# Patient Record
Sex: FEMALE | Race: WHITE | NOT HISPANIC OR LATINO | URBAN - METROPOLITAN AREA
[De-identification: names, ages, dates, MRNs, and addresses within clinical notes are randomized per-mention and may not be internally consistent; named-entity substitution may affect disease eponyms.]

---

## 2017-04-03 ENCOUNTER — OUTPATIENT (OUTPATIENT)
Dept: OUTPATIENT SERVICES | Facility: HOSPITAL | Age: 22
LOS: 1 days | Discharge: HOME | End: 2017-04-03

## 2017-06-27 DIAGNOSIS — Z00.00 ENCOUNTER FOR GENERAL ADULT MEDICAL EXAMINATION WITHOUT ABNORMAL FINDINGS: ICD-10-CM

## 2017-06-28 PROBLEM — Z00.00 ENCOUNTER FOR PREVENTIVE HEALTH EXAMINATION: Status: ACTIVE | Noted: 2017-06-28

## 2017-07-11 ENCOUNTER — APPOINTMENT (OUTPATIENT)
Dept: SURGERY | Facility: CLINIC | Age: 22
End: 2017-07-11

## 2021-09-29 ENCOUNTER — APPOINTMENT (OUTPATIENT)
Dept: NEUROLOGY | Facility: CLINIC | Age: 26
End: 2021-09-29

## 2021-10-28 ENCOUNTER — INPATIENT (INPATIENT)
Facility: HOSPITAL | Age: 26
LOS: 6 days | Discharge: HOME | End: 2021-11-04
Attending: INTERNAL MEDICINE | Admitting: INTERNAL MEDICINE
Payer: MEDICAID

## 2021-10-28 VITALS
TEMPERATURE: 99 F | WEIGHT: 110.01 LBS | RESPIRATION RATE: 20 BRPM | DIASTOLIC BLOOD PRESSURE: 78 MMHG | SYSTOLIC BLOOD PRESSURE: 125 MMHG | HEART RATE: 122 BPM | OXYGEN SATURATION: 100 %

## 2021-10-28 LAB
ALBUMIN SERPL ELPH-MCNC: 4.9 G/DL — SIGNIFICANT CHANGE UP (ref 3.5–5.2)
ALP SERPL-CCNC: 72 U/L — SIGNIFICANT CHANGE UP (ref 30–115)
ALT FLD-CCNC: 10 U/L — SIGNIFICANT CHANGE UP (ref 0–41)
ANION GAP SERPL CALC-SCNC: 10 MMOL/L — SIGNIFICANT CHANGE UP (ref 7–14)
APAP SERPL-MCNC: <5 UG/ML — LOW (ref 10–30)
APPEARANCE UR: CLEAR — SIGNIFICANT CHANGE UP
AST SERPL-CCNC: 16 U/L — SIGNIFICANT CHANGE UP (ref 0–41)
BACTERIA # UR AUTO: ABNORMAL /HPF
BASOPHILS # BLD AUTO: 0.04 K/UL — SIGNIFICANT CHANGE UP (ref 0–0.2)
BASOPHILS NFR BLD AUTO: 0.7 % — SIGNIFICANT CHANGE UP (ref 0–1)
BILIRUB SERPL-MCNC: <0.2 MG/DL — SIGNIFICANT CHANGE UP (ref 0.2–1.2)
BILIRUB UR-MCNC: NEGATIVE — SIGNIFICANT CHANGE UP
BUN SERPL-MCNC: 11 MG/DL — SIGNIFICANT CHANGE UP (ref 10–20)
CALCIUM SERPL-MCNC: 9.3 MG/DL — SIGNIFICANT CHANGE UP (ref 8.5–10.1)
CHLORIDE SERPL-SCNC: 102 MMOL/L — SIGNIFICANT CHANGE UP (ref 98–110)
CO2 SERPL-SCNC: 26 MMOL/L — SIGNIFICANT CHANGE UP (ref 17–32)
COLOR SPEC: YELLOW — SIGNIFICANT CHANGE UP
CREAT SERPL-MCNC: 0.7 MG/DL — SIGNIFICANT CHANGE UP (ref 0.7–1.5)
DIFF PNL FLD: ABNORMAL
EOSINOPHIL # BLD AUTO: 0.14 K/UL — SIGNIFICANT CHANGE UP (ref 0–0.7)
EOSINOPHIL NFR BLD AUTO: 2.5 % — SIGNIFICANT CHANGE UP (ref 0–8)
EPI CELLS # UR: ABNORMAL /HPF
ETHANOL SERPL-MCNC: <10 MG/DL — SIGNIFICANT CHANGE UP
GLUCOSE SERPL-MCNC: 100 MG/DL — HIGH (ref 70–99)
GLUCOSE UR QL: NEGATIVE MG/DL — SIGNIFICANT CHANGE UP
HCG SERPL QL: NEGATIVE — SIGNIFICANT CHANGE UP
HCT VFR BLD CALC: 41.7 % — SIGNIFICANT CHANGE UP (ref 37–47)
HGB BLD-MCNC: 13.6 G/DL — SIGNIFICANT CHANGE UP (ref 12–16)
IMM GRANULOCYTES NFR BLD AUTO: 0.2 % — SIGNIFICANT CHANGE UP (ref 0.1–0.3)
KETONES UR-MCNC: NEGATIVE — SIGNIFICANT CHANGE UP
LEUKOCYTE ESTERASE UR-ACNC: NEGATIVE — SIGNIFICANT CHANGE UP
LYMPHOCYTES # BLD AUTO: 1.61 K/UL — SIGNIFICANT CHANGE UP (ref 1.2–3.4)
LYMPHOCYTES # BLD AUTO: 29.3 % — SIGNIFICANT CHANGE UP (ref 20.5–51.1)
MAGNESIUM SERPL-MCNC: 2.1 MG/DL — SIGNIFICANT CHANGE UP (ref 1.8–2.4)
MCHC RBC-ENTMCNC: 30.5 PG — SIGNIFICANT CHANGE UP (ref 27–31)
MCHC RBC-ENTMCNC: 32.6 G/DL — SIGNIFICANT CHANGE UP (ref 32–37)
MCV RBC AUTO: 93.5 FL — SIGNIFICANT CHANGE UP (ref 81–99)
MONOCYTES # BLD AUTO: 0.38 K/UL — SIGNIFICANT CHANGE UP (ref 0.1–0.6)
MONOCYTES NFR BLD AUTO: 6.9 % — SIGNIFICANT CHANGE UP (ref 1.7–9.3)
NEUTROPHILS # BLD AUTO: 3.32 K/UL — SIGNIFICANT CHANGE UP (ref 1.4–6.5)
NEUTROPHILS NFR BLD AUTO: 60.4 % — SIGNIFICANT CHANGE UP (ref 42.2–75.2)
NITRITE UR-MCNC: NEGATIVE — SIGNIFICANT CHANGE UP
NRBC # BLD: 0 /100 WBCS — SIGNIFICANT CHANGE UP (ref 0–0)
PH UR: 7 — SIGNIFICANT CHANGE UP (ref 5–8)
PLATELET # BLD AUTO: 301 K/UL — SIGNIFICANT CHANGE UP (ref 130–400)
POTASSIUM SERPL-MCNC: 4.8 MMOL/L — SIGNIFICANT CHANGE UP (ref 3.5–5)
POTASSIUM SERPL-SCNC: 4.8 MMOL/L — SIGNIFICANT CHANGE UP (ref 3.5–5)
PROT SERPL-MCNC: 7.5 G/DL — SIGNIFICANT CHANGE UP (ref 6–8)
PROT UR-MCNC: NEGATIVE MG/DL — SIGNIFICANT CHANGE UP
RBC # BLD: 4.46 M/UL — SIGNIFICANT CHANGE UP (ref 4.2–5.4)
RBC # FLD: 11.9 % — SIGNIFICANT CHANGE UP (ref 11.5–14.5)
RBC CASTS # UR COMP ASSIST: ABNORMAL /HPF
SALICYLATES SERPL-MCNC: <0.3 MG/DL — LOW (ref 4–30)
SARS-COV-2 RNA SPEC QL NAA+PROBE: SIGNIFICANT CHANGE UP
SODIUM SERPL-SCNC: 138 MMOL/L — SIGNIFICANT CHANGE UP (ref 135–146)
SP GR SPEC: 1.02 — SIGNIFICANT CHANGE UP (ref 1.01–1.03)
UROBILINOGEN FLD QL: 0.2 MG/DL — SIGNIFICANT CHANGE UP
WBC # BLD: 5.5 K/UL — SIGNIFICANT CHANGE UP (ref 4.8–10.8)
WBC # FLD AUTO: 5.5 K/UL — SIGNIFICANT CHANGE UP (ref 4.8–10.8)

## 2021-10-28 PROCEDURE — 93010 ELECTROCARDIOGRAM REPORT: CPT

## 2021-10-28 PROCEDURE — 99291 CRITICAL CARE FIRST HOUR: CPT

## 2021-10-28 PROCEDURE — 99222 1ST HOSP IP/OBS MODERATE 55: CPT

## 2021-10-28 RX ORDER — SODIUM CHLORIDE 9 MG/ML
1000 INJECTION INTRAMUSCULAR; INTRAVENOUS; SUBCUTANEOUS ONCE
Refills: 0 | Status: COMPLETED | OUTPATIENT
Start: 2021-10-28 | End: 2021-10-28

## 2021-10-28 RX ORDER — MIDAZOLAM HYDROCHLORIDE 1 MG/ML
0.02 INJECTION, SOLUTION INTRAMUSCULAR; INTRAVENOUS
Qty: 100 | Refills: 0 | Status: DISCONTINUED | OUTPATIENT
Start: 2021-10-28 | End: 2021-10-30

## 2021-10-28 RX ORDER — SOD SULF/SODIUM/NAHCO3/KCL/PEG
4000 SOLUTION, RECONSTITUTED, ORAL ORAL
Refills: 0 | Status: DISCONTINUED | OUTPATIENT
Start: 2021-10-28 | End: 2021-10-29

## 2021-10-28 RX ORDER — CHLORHEXIDINE GLUCONATE 213 G/1000ML
15 SOLUTION TOPICAL EVERY 12 HOURS
Refills: 0 | Status: DISCONTINUED | OUTPATIENT
Start: 2021-10-28 | End: 2021-10-29

## 2021-10-28 RX ORDER — PROPOFOL 10 MG/ML
10 INJECTION, EMULSION INTRAVENOUS
Qty: 1000 | Refills: 0 | Status: DISCONTINUED | OUTPATIENT
Start: 2021-10-28 | End: 2021-10-30

## 2021-10-28 RX ORDER — SODIUM CHLORIDE 9 MG/ML
1000 INJECTION INTRAMUSCULAR; INTRAVENOUS; SUBCUTANEOUS
Refills: 0 | Status: DISCONTINUED | OUTPATIENT
Start: 2021-10-28 | End: 2021-10-29

## 2021-10-28 RX ORDER — ACTIVATED CHARCOAL 208 MG/ML
50 SUSPENSION ORAL ONCE
Refills: 0 | Status: COMPLETED | OUTPATIENT
Start: 2021-10-28 | End: 2021-10-28

## 2021-10-28 RX ORDER — FAMOTIDINE 10 MG/ML
20 INJECTION INTRAVENOUS EVERY 12 HOURS
Refills: 0 | Status: DISCONTINUED | OUTPATIENT
Start: 2021-10-28 | End: 2021-11-03

## 2021-10-28 RX ORDER — ACTIVATED CHARCOAL 25 G/120ML
50 SUSPENSION, ORAL (FINAL DOSE FORM) ORAL ONCE
Refills: 0 | Status: DISCONTINUED | OUTPATIENT
Start: 2021-10-28 | End: 2021-10-28

## 2021-10-28 RX ORDER — LACTULOSE 10 G/15ML
20 SOLUTION ORAL ONCE
Refills: 0 | Status: DISCONTINUED | OUTPATIENT
Start: 2021-10-28 | End: 2021-10-28

## 2021-10-28 RX ADMIN — SODIUM CHLORIDE 125 MILLILITER(S): 9 INJECTION INTRAMUSCULAR; INTRAVENOUS; SUBCUTANEOUS at 22:44

## 2021-10-28 RX ADMIN — Medication 1 MILLIGRAM(S): at 22:06

## 2021-10-28 RX ADMIN — Medication 1 MILLIGRAM(S): at 22:10

## 2021-10-28 RX ADMIN — SODIUM CHLORIDE 1000 MILLILITER(S): 9 INJECTION INTRAMUSCULAR; INTRAVENOUS; SUBCUTANEOUS at 16:51

## 2021-10-28 NOTE — H&P ADULT - NSHPPHYSICALEXAM_GEN_ALL_CORE
PHYSICAL EXAM:  GENERAL: NAD, well-groomed, well-developed  HEAD:  Atraumatic, Normocephalic  EYES: EOMI, PERRLA, conjunctiva and sclera clear  ENMT: No tonsillar erythema, exudates, or enlargement; Moist mucous membranes, Good dentition, No lesions  NECK: Supple, No JVD, Normal thyroid  NERVOUS SYSTEM:  Alert & Oriented X3, Good concentration; Motor Strength 5/5 B/L upper and lower extremities; DTRs 2+ intact and symmetric  CHEST/LUNG: Clear to percussion bilaterally; No rales, rhonchi, wheezing, or rubs  HEART: Regular rate and rhythm; No murmurs, rubs, or gallops  ABDOMEN: Soft, Nontender, Nondistended; Bowel sounds present  EXTREMITIES:  2+ Peripheral Pulses, No clubbing, cyanosis, or edema  LYMPH: No lymphadenopathy noted  SKIN: No rashes or lesions

## 2021-10-28 NOTE — CONSULT NOTE ADULT - SUBJECTIVE AND OBJECTIVE BOX
MEDICAL TOXICOLOGY CONSULT    HPI:25 year old F hx of borderline personality disorder, anxiety, depression, substance abuse, prior SI by ingestion, presents to ED with ingestions. Patient reported taking Venlafaxine 150mg x 20 tab and bupropion 100mg x 20 tabs at ~3pm. Denies other co-ingestions. In the ED, the patient is asymptomatic, with normal vitals and ekg, baseline mental status, afebrile, no hyperreflexia or clonus.     ONSET / TIME of exposure(s): 3pm  QUANTITY of exposure(s):  Venlafaxine 150mg x 20 tab (3 grams, bupropion 100mg x 20 tabs (2 grams)  ROUTE of exposure:  Ingestion  CONTEXT of exposure: intentional ingestion   ASSOCIATED symptoms: None    PAST MEDICAL & SURGICAL HISTORY:  H/O suicide attempt  Anxiety and depression    PHYSICAL EXAM  Vital Signs Last 24 Hrs  T(C): 37.1 (28 Oct 2021 15:58), Max: 37.1 (28 Oct 2021 15:58)  T(F): 98.7 (28 Oct 2021 15:58), Max: 98.7 (28 Oct 2021 15:58)  HR: 111 (28 Oct 2021 19:06) (98 - 122)  BP: 132/82 (28 Oct 2021 19:06) (125/78 - 132/82)  BP(mean): --  RR: 18 (28 Oct 2021 19:06) (18 - 20)  SpO2: 100% (28 Oct 2021 19:06) (100% - 100%)    SIGNIFICANT LABORATORY STUDIES:                        13.6   5.50  )-----------( 301      ( 28 Oct 2021 16:27 )             41.7     10-    138  |  102  |  11  ----------------------------<  100<H>  4.8   |  26  |  0.7    Ca    9.3      28 Oct 2021 16:27  Mg     2.1     10-    TPro  7.5  /  Alb  4.9  /  TBili  <0.2  /  DBili  x   /  AST  16  /  ALT  10  /  AlkPhos  72  10-28    Urinalysis Basic - ( 28 Oct 2021 16:27 )    Color: Yellow / Appearance: Clear / S.020 / pH: x  Gluc: x / Ketone: Negative  / Bili: Negative / Urobili: 0.2 mg/dL   Blood: x / Protein: Negative mg/dL / Nitrite: Negative   Leuk Esterase: Negative / RBC: 3-5 /HPF / WBC x   Sq Epi: x / Non Sq Epi: Many /HPF / Bacteria: TNTC /HPF    Anion Gap: 10 10-28 @ 16:27  CK: -- 10-28 @ 16:27  Troponin:  --  10-28 @ 16:27  Pro-BNP:  --  10-28 @ 16:27  VBG:  --  10-28 @ 16:27  Carboxyhemoglobin %:  --  10-28 @ 16:27  Methemoglobin %:  --  10-28 @ 16:27  Osmolality Serum:  --  10-28 @ 16:27  Aspirin Level: <0.3<L>  10-28 @ 16:27  Acetaminophen Level:  <5.0<L>  10-28 @ 16:27  Ethanol Level:  --  10-28 @ 16:27  Digoxin Level:  --  10-28 @ 16:27  Phenytoin Level:  --  10-28 @ 16:27  Carbamazepine level:  --  10-28 @ 16:27  Lamotrigine level:  --  10-28 @ 16:27    ECrate, NSR, 132PR, 86QRS, 447QTc     MEDICAL TOXICOLOGY CONSULT    HPI:25 year old F hx of borderline personality disorder, anxiety, depression, substance abuse, prior SI by ingestion, presents to ED with ingestions. Patient reported taking Venlafaxine 150mg x 20 tab and bupropion 100mg x 20 tabs at ~3pm. Denies other co-ingestions. In the ED, the patient is asymptomatic, with normal vitals and ekg, baseline mental status, afebrile, no hyperreflexia or clonus.     After a period of observation, patient now with sinus tachycardia to 130.     17:26 EKG: NSR rate 95, pr 132, qrs 86,   19:33 EKG: SINUS TACHYCARDIA rate 130, pr 136, qrs 78, qtc 450    ONSET / TIME of exposure(s): 3pm  QUANTITY of exposure(s):  Venlafaxine 150mg x 20 tab (3 grams, bupropion 100mg x 20 tabs (2 grams)  ROUTE of exposure:  Ingestion  CONTEXT of exposure: intentional ingestion   ASSOCIATED symptoms: None    PAST MEDICAL & SURGICAL HISTORY:  H/O suicide attempt  Anxiety and depression    PHYSICAL EXAM  Vital Signs Last 24 Hrs  T(C): 37.1 (28 Oct 2021 15:58), Max: 37.1 (28 Oct 2021 15:58)  T(F): 98.7 (28 Oct 2021 15:58), Max: 98.7 (28 Oct 2021 15:58)  HR: 111 (28 Oct 2021 19:06) (98 - 122)  BP: 132/82 (28 Oct 2021 19:06) (125/78 - 132/82)  BP(mean): --  RR: 18 (28 Oct 2021 19:06) (18 - 20)  SpO2: 100% (28 Oct 2021 19:06) (100% - 100%)    SIGNIFICANT LABORATORY STUDIES:                        13.6   5.50  )-----------( 301      ( 28 Oct 2021 16:27 )             41.7     10-    138  |  102  |  11  ----------------------------<  100<H>  4.8   |  26  |  0.7    Ca    9.3      28 Oct 2021 16:27  Mg     2.1     10-    TPro  7.5  /  Alb  4.9  /  TBili  <0.2  /  DBili  x   /  AST  16  /  ALT  10  /  AlkPhos  72  10-28    Urinalysis Basic - ( 28 Oct 2021 16:27 )    Color: Yellow / Appearance: Clear / S.020 / pH: x  Gluc: x / Ketone: Negative  / Bili: Negative / Urobili: 0.2 mg/dL   Blood: x / Protein: Negative mg/dL / Nitrite: Negative   Leuk Esterase: Negative / RBC: 3-5 /HPF / WBC x   Sq Epi: x / Non Sq Epi: Many /HPF / Bacteria: TNTC /HPF    Anion Gap: 10 10-28 @ 16:27  CK: -- 10-28 @ 16:27  Troponin:  --  10-28 @ 16:27  Pro-BNP:  --  10-28 @ 16:27  VBG:  --  10-28 @ 16:27  Carboxyhemoglobin %:  --  10-28 @ 16:27  Methemoglobin %:  --  10-28 @ 16:27  Osmolality Serum:  --  10-28 @ 16:27  Aspirin Level: <0.3<L>  10-28 @ 16:27  Acetaminophen Level:  <5.0<L>  10-28 @ 16:27  Ethanol Level:  --  10-28 @ 16:27  Digoxin Level:  --  10-28 @ 16:27  Phenytoin Level:  --  10-28 @ 16:27  Carbamazepine level:  --  10-28 @ 16:27  Lamotrigine level:  --  10-28 @ 16:27    ECrate, NSR, 132PR, 86QRS, 447QTc     MEDICAL TOXICOLOGY CONSULT    HPI:25 year old F hx of borderline personality disorder, anxiety, depression, substance abuse, prior SI by ingestion, presents to ED with ingestions. Patient reported taking Venlafaxine 150mg x 20 tab and bupropion 100mg x 20 tabs at ~3pm. Denies other co-ingestions. In the ED, the patient is asymptomatic, with normal vitals and ekg, baseline mental status, afebrile, no hyperreflexia or clonus.     After a period of observation, patient now with sinus tachycardia to 130. At ~10pm, patient had a witnessed seizure, currently post-ictal.     17:26 EKG: NSR rate 95, pr 132, qrs 86,   19:33 EKG: SINUS TACHYCARDIA rate 130, pr 136, qrs 78, qtc 450    ONSET / TIME of exposure(s): 3pm  QUANTITY of exposure(s):  Venlafaxine 150mg x 20 tab (3 grams, bupropion 100mg x 20 tabs (2 grams)  ROUTE of exposure:  Ingestion  CONTEXT of exposure: intentional ingestion   ASSOCIATED symptoms: None    PAST MEDICAL & SURGICAL HISTORY:  H/O suicide attempt  Anxiety and depression    PHYSICAL EXAM  Vital Signs Last 24 Hrs  T(C): 37.1 (28 Oct 2021 15:58), Max: 37.1 (28 Oct 2021 15:58)  T(F): 98.7 (28 Oct 2021 15:58), Max: 98.7 (28 Oct 2021 15:58)  HR: 111 (28 Oct 2021 19:06) (98 - 122)  BP: 132/82 (28 Oct 2021 19:06) (125/78 - 132/82)  BP(mean): --  RR: 18 (28 Oct 2021 19:06) (18 - 20)  SpO2: 100% (28 Oct 2021 19:06) (100% - 100%)    SIGNIFICANT LABORATORY STUDIES:                        13.6   5.50  )-----------( 301      ( 28 Oct 2021 16:27 )             41.7     10-28    138  |  102  |  11  ----------------------------<  100<H>  4.8   |  26  |  0.7    Ca    9.3      28 Oct 2021 16:27  Mg     2.1     10-28    TPro  7.5  /  Alb  4.9  /  TBili  <0.2  /  DBili  x   /  AST  16  /  ALT  10  /  AlkPhos  72  10-28    Urinalysis Basic - ( 28 Oct 2021 16:27 )    Color: Yellow / Appearance: Clear / S.020 / pH: x  Gluc: x / Ketone: Negative  / Bili: Negative / Urobili: 0.2 mg/dL   Blood: x / Protein: Negative mg/dL / Nitrite: Negative   Leuk Esterase: Negative / RBC: 3-5 /HPF / WBC x   Sq Epi: x / Non Sq Epi: Many /HPF / Bacteria: TNTC /HPF    Anion Gap: 10 10-28 @ 16:27  CK: -- 10-28 @ 16:27  Troponin:  --  10-28 @ 16:27  Pro-BNP:  --  10-28 @ 16:27  VBG:  --  10-28 @ 16:27  Carboxyhemoglobin %:  --  10-28 @ 16:27  Methemoglobin %:  --  10-28 @ 16:27  Osmolality Serum:  --  10-28 @ 16:27  Aspirin Level: <0.3<L>  10-28 @ 16:27  Acetaminophen Level:  <5.0<L>  10-28 @ 16:27  Ethanol Level:  --  10-28 @ 16:27  Digoxin Level:  --  10-28 @ 16:27  Phenytoin Level:  --  10-28 @ 16:27  Carbamazepine level:  --  10-28 @ 16:27  Lamotrigine level:  --  10-28 @ 16:27

## 2021-10-28 NOTE — ED PROVIDER NOTE - PROGRESS NOTE DETAILS
patient placed on 1:1 accepts toxicology consultation . spoke with tox. recommending 6 hr observation and monitoring. patient with tachycardia and temp 99.4. toxicology requesting ICU eval, prn benzo and iv hydration patient refusing ativan

## 2021-10-28 NOTE — ED PROVIDER NOTE - ATTENDING CONTRIBUTION TO CARE
26 yo F PMHx anxiety and depression, h/o suicide attempt in the past presents with family member after taking about 10-20 tablets of Effexor (mixed doses of 75 and 150 mg)and 20 100 mg Wellbutrin tablets about 90 minutes ago. Pts intent is to self harm 26 yo F PMHx anxiety and depression, h/o suicide attempt in the past presents with family member after taking about 10-20 tablets of Effexor (mixed doses of 75 and 150 mg)and 20 100 mg Wellbutrin tablets about 90 minutes ago. Pts intent is to self harm.    Pt states currently feeling fine, no CP, no SOB, no n/v.  On exam pt in NAD AAO x 3, Op clear, MMM, Lungs cta b/l no wrr, abd soft nt nd, nml BS, no rash, no edema, good tone, equal strength, no clonus    will check labs, cardiac monitoring, EKG.  Pt consents to toxicology consult

## 2021-10-28 NOTE — ED PROVIDER NOTE - CLINICAL SUMMARY MEDICAL DECISION MAKING FREE TEXT BOX
Pt with intentional overdose of wellbutrin and Effexor.  Toxicology consulted and rec observation, however while patient in ED HR remained elevated.  Pt re-examined and rectal temp noted.  Tox rec IVFs and benzos.  Ativan ordered for patient but she initially refused it. Critical care consulted for admission.  Pt then had brief seizure witnessed by staff and family member.  The Ativan was given.  Pt post-ictal able to answer simple questions, .  Tox and Crit care updated on events,.   Attending Statement: I have personally provided the amount of critical care time documented below excluding time spent on separate procedures.     Critical Care Time Spent (min) Must be 30 or more minutes to qualify: 90

## 2021-10-28 NOTE — ED PROVIDER NOTE - OBJECTIVE STATEMENT
24 y/o female with hx of prior suicide attempt in past 6 yrs ago presents to the ED for suicide gesture 1.5 hrs prior to arrival to the ED. patient states she took effexor 150mg x 20 tablets and wellbutrin 100mg x 20 tablets . patient smokes marijuana . patient denies any alcohol. patient states she took nyquil two days ago. patient denies any recent stressors . patient denies hearing voices. patient denies any cp, sob, headache, tremors. patient denies any difficulty urinating or dry mouth

## 2021-10-28 NOTE — ED ADULT TRIAGE NOTE - CHIEF COMPLAINT QUOTE
BIBA from home as per pt "I took 45 of Wellbutrin and 40 pills of Effexor". Per EMS pt overdosed on Wellbutrin and Effexor intentionally

## 2021-10-28 NOTE — H&P ADULT - HISTORY OF PRESENT ILLNESS
25 year old F hx of borderline personality disorder, anxiety, depression, substance abuse, prior SI by ingestion, presents to ED with ingestions. Patient reported taking Venlafaxine 150mg x 20 tab and bupropion 100mg x 20 tabs at ~3pm.

## 2021-10-28 NOTE — ED PROVIDER NOTE - PHYSICAL EXAMINATION
Vital Signs: I have reviewed the initial vital signs.  Constitutional: well-nourished, no acute distress, normocephalic  Eyes: PERRLA, EOMI, no nystagmus, clear conjunctiva  ENT: MMM, no tongue fasiculations  Cardiovascular: regular rate, regular rhythm, no murmur appreciated  Respiratory: unlabored respiratory effort, clear to auscultation bilaterally  Gastrointestinal: soft, non-tender, non-distended  abdomen, no pulsatile mass  Musculoskeletal: supple neck, no lower extremity edema, no bony tenderness, no tremors  Integumentary: warm, dry, no rash  Neurologic: awake, alert, cranial nerves II-XII grossly intact, extremities’ motor and sensory functions grossly intact, no focal deficits,   Psychiatric: appropriate mood, appropriate affect

## 2021-10-28 NOTE — CONSULT NOTE ADULT - ASSESSMENT
25 year old F hx of borderline personality disorder, anxiety, depression, substance abuse, prior SI by ingestion, presents with ingestions of 2 grams bupropion and 2 gram venlafaxine     Problem: Ingestion of venlafaxine and bupropion  - Currently asymptomatic, no tachycardia, QTC prolongation, ams, seizures, serotonin syndrome  - Monitor for 6 hours after arrival for symptoms above  - If asymptomatic after obs, can be cleared from toxicological standpoint.   - If patient develops any concerning symptoms, please notify tox service.     Thank you for this consult  Toxicology consults: [phone]     25 year old F hx of borderline personality disorder, anxiety, depression, substance abuse, prior SI by ingestion, presents with ingestions of 2 grams bupropion and 2 gram venlafaxine. Asymptomatic at at ED presentation, but became tachycardic after two hours in the ED.     Problem: Ingestion of venlafaxine and bupropion  - Sinus tachycardia to 130 with no qrs or qt prolongation, no ams, seizures, or serotonin syndrome.   - co-ingestion of venlafaxine and bupropion put the patient at higher risk of severe toxicity and serotonin syndrome   - Admit to ICU for q1 hour EKG for next 3 hours; and then 6 hours later, and then 12 hours.   - q2 neuro checks for development of fever, AMS, seizures, hyperreflexia, clonus, serotonin syndrome.   - PRN benzodiazepine for tachycardia  - If patient develops any concerning symptoms, please notify tox service.   - case discussed with attending    Thank you for this consult  Toxicology consults: [phone]     25 year old F hx of borderline personality disorder, anxiety, depression, substance abuse, prior SI by ingestion, presents with ingestions of 2 grams bupropion and 2 gram venlafaxine. Asymptomatic at at ED presentation, but became tachycardic after two hours in the ED.     Problem: Ingestion of venlafaxine and bupropion  - Sinus tachycardia to 130 with no qrs or qt prolongation, no ams, seizures, or serotonin syndrome.   - co-ingestion of venlafaxine and bupropion put the patient at higher risk of severe toxicity and serotonin syndrome   - Admit to ICU  - q1 hour EKG for next 3 hours; and then 6 hours later, and then 12 hours to look for QRS and QTc prolongation  - q2 neuro checks for development of fever, AMS, seizures, hyperreflexia, clonus, serotonin syndrome.   - PRN benzodiazepine for tachycardia  - If patient develops any concerning symptoms, please notify tox service.   - case discussed with attending    Thank you for this consult  Toxicology consults: [phone]     25 year old F hx of borderline personality disorder, anxiety, depression, substance abuse, prior SI by ingestion, presents with ingestions of 2 grams bupropion and 2 gram venlafaxine. Asymptomatic at at ED presentation, but became tachycardic after two hours in the ED.     Problem: Ingestion of venlafaxine and bupropion  - Patient initially asymptomatic, and developed sinus tachycardia to 150's and had witnessed seizure in the ED, currently post-ictal  - co-ingestion of venlafaxine and bupropion put the patient at higher risk of severe toxicity and serotonin syndrome   - Admit to ICU  - Intubation and sedate with propofol or midazolam gtt.   - GI decontamination after intubation: give 50mg activated charcoal, followed by polyethylene glycol solution at a rate of 2 liters/hr until the rectal effluent is clear OR a total of 10-15 L has passed.  - q1 hour EKG for next 3 hours; and then 6 hours later, and then 12 hours to look for QRS and QTc prolongation    Treatment for severe symptoms:  - Seizures--> benzodiazepine barbiturates or propofol.    - QRS prolongation >120ms --> sodium bicarbonate 1-2mg/kg IV bolus.   - Polymorphic ventricular tachycardia --> IV magnesium 2 mg or cardioversion (if hemodynamically unstable)  - Serotonin syndrome --> cyproheptadine 12mg orally, followed by 4-8 mg q4-6 hours.   - case discussed with attending    Thank you for this consult  Toxicology consults: [phone]     25 year old F hx of borderline personality disorder, anxiety, depression, substance abuse, prior SI by ingestion, presents with ingestions of 2 grams bupropion and 2 gram venlafaxine. Asymptomatic at at ED presentation, but became tachycardic after two hours in the ED.     Problem: Ingestion of venlafaxine and bupropion  - Patient initially asymptomatic, and developed sinus tachycardia to 150's and had witnessed seizure in the ED, currently post-ictal  - co-ingestion of venlafaxine and bupropion put the patient at higher risk of severe toxicity, including seizures, QRS prolongation, QT prolongation, ventricular arrhthymias and serotonin syndrome   - Admit to ICU  - Intubation and sedate with propofol or midazolam gtt.   - GI decontamination after intubation: give 50mg activated charcoal, followed by polyethylene glycol solution at a rate of 2 liters/hr until the rectal effluent is clear OR a total of 10-15 L has passed.  - q1 hour EKG for next 3 hours; and then 6 hours later, and then 12 hours to look for QRS and QTc prolongation    Treatment for severe symptoms:  - Seizures--> benzodiazepine barbiturates or propofol.    - QRS prolongation >120ms --> sodium bicarbonate 1-2mg/kg IV bolus.   - Polymorphic ventricular tachycardia --> IV magnesium 2 mg or cardioversion (if hemodynamically unstable)  - Serotonin syndrome --> cyproheptadine 12mg orally, followed by 4-8 mg q4-6 hours.   - case discussed with attending    Thank you for this consult  Toxicology consults: [phone]     25 year old F hx of borderline personality disorder, anxiety, depression, substance abuse, prior SI by ingestion, presents with ingestions of 2 grams bupropion and 2 gram venlafaxine. Asymptomatic at at ED presentation, but became tachycardic after two hours in the ED.     Problem: Ingestion of venlafaxine and bupropion  - Patient initially asymptomatic, and developed sinus tachycardia to 150's and had witnessed seizure in the ED, currently post-ictal  - co-ingestion of venlafaxine and bupropion put the patient at higher risk of severe toxicity, including seizures, QRS prolongation, QT prolongation, ventricular arrhthymias and serotonin syndrome   - Admit to ICU  - Intubation and sedate with propofol or midazolam gtt.   - GI decontamination after intubation: give 50mg charcoal with sorbitol, followed by polyethylene glycol solution at a rate of 2 liters/hr until the rectal effluent is clear OR a total of 10-15 L has passed.  - q1 hour EKG for next 3 hours; and then 6 hours later, and then 12 hours to look for QRS and QTc prolongation    Treatment for severe symptoms:  - Seizures--> benzodiazepine barbiturates or propofol.    - QRS prolongation >120ms --> sodium bicarbonate 1-2mg/kg IV bolus.   - Polymorphic ventricular tachycardia --> IV magnesium 2 mg or cardioversion (if hemodynamically unstable)  - Serotonin syndrome --> cyproheptadine 12mg orally, followed by 4-8 mg q4-6 hours.   - case discussed with attending    Thank you for this consult  Toxicology consults: [phone]     25 year old F hx of borderline personality disorder, anxiety, depression, substance abuse, prior SI by ingestion, presents with ingestions of 2 grams bupropion and 2 gram venlafaxine. Asymptomatic at at ED presentation, but became tachycardic after two hours in the ED.     Problem: Ingestion of venlafaxine and bupropion  - Patient initially asymptomatic, and developed sinus tachycardia to 150's and had witnessed seizure in the ED, currently post-ictal  - co-ingestion of venlafaxine and bupropion put the patient at higher risk of severe toxicity, including seizures, QRS prolongation, QT prolongation, ventricular arrhthymias and serotonin syndrome   - Admit to ICU  - Intubation and sedate with propofol or midazolam gtt.   - GI decontamination after intubation: give 50g charcoal with sorbitol, followed by polyethylene glycol solution at a rate of 1. liters/hr until the rectal effluent is clear OR a total of 10-15 L has passed.  - q1 hour EKG for next 3 hours; and then 6 hours later, and then 12 hours to look for QRS and QTc prolongation  - case discussed with attending    Treatment for severe symptoms:  - Seizures--> benzodiazepine barbiturates or propofol.    - QRS prolongation >120ms --> sodium bicarbonate 1-2mg/kg IV bolus.   - Polymorphic ventricular tachycardia --> IV magnesium 2 mg or cardioversion (if hemodynamically unstable)  - Serotonin syndrome --> cyproheptadine 12mg orally, followed by 4-8 mg q4-6 hours.       Thank you for this consult  Toxicology consults: [phone]     25 year old F hx of borderline personality disorder, anxiety, depression, substance abuse, prior SI by ingestion, presents with ingestions of 2 grams bupropion and 2 gram venlafaxine. Asymptomatic at at ED presentation, but became tachycardic after two hours in the ED.     Problem: Ingestion of venlafaxine and bupropion  - Patient initially asymptomatic, and developed sinus tachycardia to 150's and had witnessed seizure in the ED, currently post-ictal  - co-ingestion of venlafaxine and bupropion put the patient at higher risk of severe toxicity, including seizures, QRS prolongation, QT prolongation, ventricular arrhthymias and serotonin syndrome   - Admit to ICU  - Intubation and sedate with propofol or midazolam gtt.   - GI decontamination after intubation: give 50g charcoal with sorbitol, followed by polyethylene glycol solution at a rate of 1. liters/hr until the rectal effluent is clear OR a total of 10-15 L has passed.  - q1 hour EKG for next 3 hours; and then 6 hours later, and then 12 hours to look for QRS and QTc prolongation  - case discussed with attending    Treatment for severe symptoms:  - Seizures--> benzodiazepine barbiturates or propofol.    - QRS prolongation >120ms --> sodium bicarbonate 1-2mg/kg IV bolus.   - Polymorphic ventricular tachycardia --> IV magnesium 2 mg or cardioversion (if hemodynamically unstable)  - Serotonin syndrome --> cyproheptadine 12mg orally, followed by 4-8 mg q4-6 hours.       Thank you for this consult  Toxicology consults: [phone]    I personally discussed with inpatient team. I reviewed the med tox fellow’s note (as assigned above), and agree with the findings and plan except as documented in my note.  Venlafaxine and bupropion ingestion  -  seizures  - ventricular dysrhythmias (Na channel blockade as well as gap junction blocker)  - serotonin syndrome  -  hyperthtermia    Recc:  -  Aggressive sedation with IV benzodiazepines  -  Consider whole bowel irrigation using 1-2 liters per hour or polyethylene glycol via NG tube to force bupropion through GI tract  -  Monitor for hyperthermia/rigidity    --Will continue to follow. Please call with any further questions    Kamini    [phone] (pager)

## 2021-10-28 NOTE — ED PROVIDER NOTE - NS ED ROS FT
Review of Systems    Constitutional: (-) fever/ chills (-)loss of appetite or  weight loss  Eyes (-) visual changes  ENT: (-) epistaxis (-) sore throat (-) ear pain  Cardiovascular: (-) chest pain, (-) syncope (-) palpitations  Respiratory: (-) cough, (-) shortness of breath  Gastrointestinal: (-) vomiting, (-) diarrhea (-) abdominal pain  : (-) dysuria , hematuria   neck: (-) neck pain or stiffness  Musculoskeletal:  (-) back pain, (-) joint pain   Integumentary: (-) rash, (-) swelling  Neurological: (-) headache, (-) altered mental status  Psychiatric: (-) hallucinations (+) depression

## 2021-10-28 NOTE — H&P ADULT - NSHPLABSRESULTS_GEN_ALL_CORE
labs  10-28    138  |  102  |  11  ----------------------------<  100<H>  4.8   |  26  |  0.7    Ca    9.3      28 Oct 2021 16:27  Mg     2.1     10-28    TPro  7.5  /  Alb  4.9  /  TBili  <0.2  /  DBili  x   /  AST  16  /  ALT  10  /  AlkPhos  72  10-28                          13.6   5.50  )-----------( 301      ( 28 Oct 2021 16:27 )             41.7

## 2021-10-28 NOTE — ED PROVIDER NOTE - CARE PLAN
1 Principal Discharge DX:	Drug overdose  Secondary Diagnosis:	Suicidal behavior with attempted self-injury   Principal Discharge DX:	Drug overdose  Secondary Diagnosis:	Suicidal behavior with attempted self-injury  Secondary Diagnosis:	Seizure

## 2021-10-28 NOTE — H&P ADULT - ASSESSMENT
A/P:       25 yaer old woman with drug overdose    - ekg in am   - monitor electrolytes  - psych consult in am   - DVT ppx  A/P:       25 yaer old woman with drug overdose    - ekg in am   - monitor electrolytes  - psych consult in am   - DVT ppx     Addendum  Pt had seizure in ED  - neuro consult in am   - eeg   - ativan prn   - aspiration precautions  - NPO

## 2021-10-28 NOTE — H&P ADULT - NSHPREVIEWOFSYSTEMS_GEN_ALL_CORE
REVIEW OF SYSTEMS  General: awake / alert  Skin/Breast: no rashes	  Ophthalmologic: no blurry vision 	  ENMT:	no thrush   Respiratory and Thorax: no sob	  Cardiovascular:	no chest pain   Gastrointestinal:	no diarrhea   Genitourinary:	no dysuria   Musculoskeletal:	 no wekaness  Neurological:	no wekaness  Psychiatric:	no hallucinations

## 2021-10-29 LAB
ALBUMIN SERPL ELPH-MCNC: 3.6 G/DL — SIGNIFICANT CHANGE UP (ref 3.5–5.2)
ALBUMIN SERPL ELPH-MCNC: 3.7 G/DL — SIGNIFICANT CHANGE UP (ref 3.5–5.2)
ALP SERPL-CCNC: 44 U/L — SIGNIFICANT CHANGE UP (ref 30–115)
ALP SERPL-CCNC: 50 U/L — SIGNIFICANT CHANGE UP (ref 30–115)
ALT FLD-CCNC: 10 U/L — SIGNIFICANT CHANGE UP (ref 0–41)
ALT FLD-CCNC: 9 U/L — SIGNIFICANT CHANGE UP (ref 0–41)
AMYLASE P1 CFR SERPL: 132 U/L — HIGH (ref 25–115)
ANION GAP SERPL CALC-SCNC: 10 MMOL/L — SIGNIFICANT CHANGE UP (ref 7–14)
ANION GAP SERPL CALC-SCNC: 11 MMOL/L — SIGNIFICANT CHANGE UP (ref 7–14)
ANION GAP SERPL CALC-SCNC: 13 MMOL/L — SIGNIFICANT CHANGE UP (ref 7–14)
ANION GAP SERPL CALC-SCNC: 14 MMOL/L — SIGNIFICANT CHANGE UP (ref 7–14)
ANION GAP SERPL CALC-SCNC: 9 MMOL/L — SIGNIFICANT CHANGE UP (ref 7–14)
AST SERPL-CCNC: 17 U/L — SIGNIFICANT CHANGE UP (ref 0–41)
AST SERPL-CCNC: <5 U/L — SIGNIFICANT CHANGE UP (ref 0–41)
BASOPHILS # BLD AUTO: 0.04 K/UL — SIGNIFICANT CHANGE UP (ref 0–0.2)
BASOPHILS NFR BLD AUTO: 0.4 % — SIGNIFICANT CHANGE UP (ref 0–1)
BILIRUB DIRECT SERPL-MCNC: <0.2 MG/DL — SIGNIFICANT CHANGE UP (ref 0–0.2)
BILIRUB INDIRECT FLD-MCNC: 0 MG/DL — SIGNIFICANT CHANGE UP (ref 0.2–1.2)
BILIRUB SERPL-MCNC: <0.2 MG/DL — SIGNIFICANT CHANGE UP (ref 0.2–1.2)
BILIRUB SERPL-MCNC: <0.2 MG/DL — SIGNIFICANT CHANGE UP (ref 0.2–1.2)
BUN SERPL-MCNC: 4 MG/DL — LOW (ref 10–20)
BUN SERPL-MCNC: 5 MG/DL — LOW (ref 10–20)
BUN SERPL-MCNC: 6 MG/DL — LOW (ref 10–20)
BUN SERPL-MCNC: <3 MG/DL — LOW (ref 10–20)
BUN SERPL-MCNC: <3 MG/DL — LOW (ref 10–20)
CALCIUM SERPL-MCNC: 7.5 MG/DL — LOW (ref 8.5–10.1)
CALCIUM SERPL-MCNC: 7.6 MG/DL — LOW (ref 8.5–10.1)
CALCIUM SERPL-MCNC: 7.9 MG/DL — LOW (ref 8.5–10.1)
CALCIUM SERPL-MCNC: 8 MG/DL — LOW (ref 8.5–10.1)
CALCIUM SERPL-MCNC: 8.1 MG/DL — LOW (ref 8.5–10.1)
CHLORIDE SERPL-SCNC: 104 MMOL/L — SIGNIFICANT CHANGE UP (ref 98–110)
CHLORIDE SERPL-SCNC: 105 MMOL/L — SIGNIFICANT CHANGE UP (ref 98–110)
CHLORIDE SERPL-SCNC: 106 MMOL/L — SIGNIFICANT CHANGE UP (ref 98–110)
CHLORIDE SERPL-SCNC: 107 MMOL/L — SIGNIFICANT CHANGE UP (ref 98–110)
CHLORIDE SERPL-SCNC: 108 MMOL/L — SIGNIFICANT CHANGE UP (ref 98–110)
CK SERPL-CCNC: 153 U/L — SIGNIFICANT CHANGE UP (ref 0–225)
CK SERPL-CCNC: 159 U/L — SIGNIFICANT CHANGE UP (ref 0–225)
CK SERPL-CCNC: 220 U/L — SIGNIFICANT CHANGE UP (ref 0–225)
CO2 SERPL-SCNC: 18 MMOL/L — SIGNIFICANT CHANGE UP (ref 17–32)
CO2 SERPL-SCNC: 19 MMOL/L — SIGNIFICANT CHANGE UP (ref 17–32)
CO2 SERPL-SCNC: 24 MMOL/L — SIGNIFICANT CHANGE UP (ref 17–32)
CO2 SERPL-SCNC: 24 MMOL/L — SIGNIFICANT CHANGE UP (ref 17–32)
CO2 SERPL-SCNC: 25 MMOL/L — SIGNIFICANT CHANGE UP (ref 17–32)
COVID-19 SPIKE DOMAIN AB INTERP: POSITIVE
COVID-19 SPIKE DOMAIN ANTIBODY RESULT: >250 U/ML — HIGH
CREAT SERPL-MCNC: 0.5 MG/DL — LOW (ref 0.7–1.5)
CREAT SERPL-MCNC: 0.6 MG/DL — LOW (ref 0.7–1.5)
CREAT SERPL-MCNC: 0.6 MG/DL — LOW (ref 0.7–1.5)
CREAT SERPL-MCNC: <0.5 MG/DL — LOW (ref 0.7–1.5)
CREAT SERPL-MCNC: <0.5 MG/DL — LOW (ref 0.7–1.5)
EOSINOPHIL # BLD AUTO: 0.05 K/UL — SIGNIFICANT CHANGE UP (ref 0–0.7)
EOSINOPHIL NFR BLD AUTO: 0.5 % — SIGNIFICANT CHANGE UP (ref 0–8)
GLUCOSE BLDC GLUCOMTR-MCNC: 209 MG/DL — HIGH (ref 70–99)
GLUCOSE BLDC GLUCOMTR-MCNC: 48 MG/DL — CRITICAL LOW (ref 70–99)
GLUCOSE BLDC GLUCOMTR-MCNC: 75 MG/DL — SIGNIFICANT CHANGE UP (ref 70–99)
GLUCOSE BLDC GLUCOMTR-MCNC: 79 MG/DL — SIGNIFICANT CHANGE UP (ref 70–99)
GLUCOSE SERPL-MCNC: 67 MG/DL — LOW (ref 70–99)
GLUCOSE SERPL-MCNC: 80 MG/DL — SIGNIFICANT CHANGE UP (ref 70–99)
GLUCOSE SERPL-MCNC: 87 MG/DL — SIGNIFICANT CHANGE UP (ref 70–99)
GLUCOSE SERPL-MCNC: 89 MG/DL — SIGNIFICANT CHANGE UP (ref 70–99)
GLUCOSE SERPL-MCNC: 89 MG/DL — SIGNIFICANT CHANGE UP (ref 70–99)
HCT VFR BLD CALC: 32.7 % — LOW (ref 37–47)
HCT VFR BLD CALC: 34.3 % — LOW (ref 37–47)
HCT VFR BLD CALC: 34.7 % — LOW (ref 37–47)
HGB BLD-MCNC: 10.5 G/DL — LOW (ref 12–16)
HGB BLD-MCNC: 11.3 G/DL — LOW (ref 12–16)
HGB BLD-MCNC: 11.6 G/DL — LOW (ref 12–16)
IMM GRANULOCYTES NFR BLD AUTO: 0.4 % — HIGH (ref 0.1–0.3)
LACTATE SERPL-SCNC: 1.2 MMOL/L — SIGNIFICANT CHANGE UP (ref 0.7–2)
LACTATE SERPL-SCNC: 1.3 MMOL/L — SIGNIFICANT CHANGE UP (ref 0.7–2)
LACTATE SERPL-SCNC: 1.8 MMOL/L — SIGNIFICANT CHANGE UP (ref 0.7–2)
LDH SERPL L TO P-CCNC: 283 — HIGH (ref 50–242)
LIDOCAIN IGE QN: 19 U/L — SIGNIFICANT CHANGE UP (ref 7–60)
LYMPHOCYTES # BLD AUTO: 1.38 K/UL — SIGNIFICANT CHANGE UP (ref 1.2–3.4)
LYMPHOCYTES # BLD AUTO: 13.6 % — LOW (ref 20.5–51.1)
MAGNESIUM SERPL-MCNC: 1.7 MG/DL — LOW (ref 1.8–2.4)
MAGNESIUM SERPL-MCNC: 1.8 MG/DL — SIGNIFICANT CHANGE UP (ref 1.8–2.4)
MAGNESIUM SERPL-MCNC: 2 MG/DL — SIGNIFICANT CHANGE UP (ref 1.8–2.4)
MAGNESIUM SERPL-MCNC: 2 MG/DL — SIGNIFICANT CHANGE UP (ref 1.8–2.4)
MCHC RBC-ENTMCNC: 31.1 PG — HIGH (ref 27–31)
MCHC RBC-ENTMCNC: 31.2 PG — HIGH (ref 27–31)
MCHC RBC-ENTMCNC: 32 PG — HIGH (ref 27–31)
MCHC RBC-ENTMCNC: 32.1 G/DL — SIGNIFICANT CHANGE UP (ref 32–37)
MCHC RBC-ENTMCNC: 32.9 G/DL — SIGNIFICANT CHANGE UP (ref 32–37)
MCHC RBC-ENTMCNC: 33.4 G/DL — SIGNIFICANT CHANGE UP (ref 32–37)
MCV RBC AUTO: 94.8 FL — SIGNIFICANT CHANGE UP (ref 81–99)
MCV RBC AUTO: 95.6 FL — SIGNIFICANT CHANGE UP (ref 81–99)
MCV RBC AUTO: 96.7 FL — SIGNIFICANT CHANGE UP (ref 81–99)
MONOCYTES # BLD AUTO: 0.66 K/UL — HIGH (ref 0.1–0.6)
MONOCYTES NFR BLD AUTO: 6.5 % — SIGNIFICANT CHANGE UP (ref 1.7–9.3)
NEUTROPHILS # BLD AUTO: 8 K/UL — HIGH (ref 1.4–6.5)
NEUTROPHILS NFR BLD AUTO: 78.6 % — HIGH (ref 42.2–75.2)
NRBC # BLD: 0 /100 WBCS — SIGNIFICANT CHANGE UP (ref 0–0)
PHOSPHATE SERPL-MCNC: 2.2 MG/DL — SIGNIFICANT CHANGE UP (ref 2.1–4.9)
PHOSPHATE SERPL-MCNC: 2.5 MG/DL — SIGNIFICANT CHANGE UP (ref 2.1–4.9)
PHOSPHATE SERPL-MCNC: 2.6 MG/DL — SIGNIFICANT CHANGE UP (ref 2.1–4.9)
PLATELET # BLD AUTO: 219 K/UL — SIGNIFICANT CHANGE UP (ref 130–400)
PLATELET # BLD AUTO: 251 K/UL — SIGNIFICANT CHANGE UP (ref 130–400)
PLATELET # BLD AUTO: 251 K/UL — SIGNIFICANT CHANGE UP (ref 130–400)
POTASSIUM SERPL-MCNC: 3.1 MMOL/L — LOW (ref 3.5–5)
POTASSIUM SERPL-MCNC: 3.7 MMOL/L — SIGNIFICANT CHANGE UP (ref 3.5–5)
POTASSIUM SERPL-MCNC: 4.4 MMOL/L — SIGNIFICANT CHANGE UP (ref 3.5–5)
POTASSIUM SERPL-MCNC: 4.6 MMOL/L — SIGNIFICANT CHANGE UP (ref 3.5–5)
POTASSIUM SERPL-MCNC: 4.8 MMOL/L — SIGNIFICANT CHANGE UP (ref 3.5–5)
POTASSIUM SERPL-SCNC: 3.1 MMOL/L — LOW (ref 3.5–5)
POTASSIUM SERPL-SCNC: 3.7 MMOL/L — SIGNIFICANT CHANGE UP (ref 3.5–5)
POTASSIUM SERPL-SCNC: 4.4 MMOL/L — SIGNIFICANT CHANGE UP (ref 3.5–5)
POTASSIUM SERPL-SCNC: 4.6 MMOL/L — SIGNIFICANT CHANGE UP (ref 3.5–5)
POTASSIUM SERPL-SCNC: 4.8 MMOL/L — SIGNIFICANT CHANGE UP (ref 3.5–5)
PROT SERPL-MCNC: 5.6 G/DL — LOW (ref 6–8)
PROT SERPL-MCNC: 5.8 G/DL — LOW (ref 6–8)
RBC # BLD: 3.38 M/UL — LOW (ref 4.2–5.4)
RBC # BLD: 3.62 M/UL — LOW (ref 4.2–5.4)
RBC # BLD: 3.63 M/UL — LOW (ref 4.2–5.4)
RBC # FLD: 12.1 % — SIGNIFICANT CHANGE UP (ref 11.5–14.5)
RBC # FLD: 12.4 % — SIGNIFICANT CHANGE UP (ref 11.5–14.5)
RBC # FLD: 12.4 % — SIGNIFICANT CHANGE UP (ref 11.5–14.5)
SARS-COV-2 IGG+IGM SERPL QL IA: >250 U/ML — HIGH
SARS-COV-2 IGG+IGM SERPL QL IA: POSITIVE
SODIUM SERPL-SCNC: 137 MMOL/L — SIGNIFICANT CHANGE UP (ref 135–146)
SODIUM SERPL-SCNC: 138 MMOL/L — SIGNIFICANT CHANGE UP (ref 135–146)
SODIUM SERPL-SCNC: 138 MMOL/L — SIGNIFICANT CHANGE UP (ref 135–146)
SODIUM SERPL-SCNC: 140 MMOL/L — SIGNIFICANT CHANGE UP (ref 135–146)
SODIUM SERPL-SCNC: 144 MMOL/L — SIGNIFICANT CHANGE UP (ref 135–146)
WBC # BLD: 10.17 K/UL — SIGNIFICANT CHANGE UP (ref 4.8–10.8)
WBC # BLD: 10.49 K/UL — SIGNIFICANT CHANGE UP (ref 4.8–10.8)
WBC # BLD: 9.27 K/UL — SIGNIFICANT CHANGE UP (ref 4.8–10.8)
WBC # FLD AUTO: 10.17 K/UL — SIGNIFICANT CHANGE UP (ref 4.8–10.8)
WBC # FLD AUTO: 10.49 K/UL — SIGNIFICANT CHANGE UP (ref 4.8–10.8)
WBC # FLD AUTO: 9.27 K/UL — SIGNIFICANT CHANGE UP (ref 4.8–10.8)

## 2021-10-29 PROCEDURE — ZZZZZ: CPT

## 2021-10-29 PROCEDURE — 95720 EEG PHY/QHP EA INCR W/VEEG: CPT

## 2021-10-29 PROCEDURE — 93010 ELECTROCARDIOGRAM REPORT: CPT

## 2021-10-29 PROCEDURE — 99233 SBSQ HOSP IP/OBS HIGH 50: CPT

## 2021-10-29 PROCEDURE — 71045 X-RAY EXAM CHEST 1 VIEW: CPT | Mod: 26

## 2021-10-29 PROCEDURE — 99291 CRITICAL CARE FIRST HOUR: CPT

## 2021-10-29 RX ORDER — CHLORHEXIDINE GLUCONATE 213 G/1000ML
1 SOLUTION TOPICAL
Refills: 0 | Status: DISCONTINUED | OUTPATIENT
Start: 2021-10-29 | End: 2021-11-04

## 2021-10-29 RX ORDER — POTASSIUM CHLORIDE 20 MEQ
20 PACKET (EA) ORAL
Refills: 0 | Status: COMPLETED | OUTPATIENT
Start: 2021-10-29 | End: 2021-10-29

## 2021-10-29 RX ORDER — SODIUM CHLORIDE 9 MG/ML
1000 INJECTION, SOLUTION INTRAVENOUS
Refills: 0 | Status: DISCONTINUED | OUTPATIENT
Start: 2021-10-29 | End: 2021-10-29

## 2021-10-29 RX ORDER — CYPROHEPTADINE HYDROCHLORIDE 4 MG/1
4 TABLET ORAL EVERY 6 HOURS
Refills: 0 | Status: DISCONTINUED | OUTPATIENT
Start: 2021-10-29 | End: 2021-10-31

## 2021-10-29 RX ORDER — CHLORHEXIDINE GLUCONATE 213 G/1000ML
15 SOLUTION TOPICAL EVERY 12 HOURS
Refills: 0 | Status: DISCONTINUED | OUTPATIENT
Start: 2021-10-29 | End: 2021-11-03

## 2021-10-29 RX ORDER — SOD SULF/SODIUM/NAHCO3/KCL/PEG
2000 SOLUTION, RECONSTITUTED, ORAL ORAL
Refills: 0 | Status: COMPLETED | OUTPATIENT
Start: 2021-10-29 | End: 2021-10-29

## 2021-10-29 RX ORDER — SOD SULF/SODIUM/NAHCO3/KCL/PEG
3000 SOLUTION, RECONSTITUTED, ORAL ORAL
Refills: 0 | Status: DISCONTINUED | OUTPATIENT
Start: 2021-10-29 | End: 2021-10-29

## 2021-10-29 RX ORDER — SOD SULF/SODIUM/NAHCO3/KCL/PEG
2000 SOLUTION, RECONSTITUTED, ORAL ORAL
Refills: 0 | Status: DISCONTINUED | OUTPATIENT
Start: 2021-10-29 | End: 2021-10-29

## 2021-10-29 RX ORDER — CYPROHEPTADINE HYDROCHLORIDE 4 MG/1
12 TABLET ORAL ONCE
Refills: 0 | Status: COMPLETED | OUTPATIENT
Start: 2021-10-29 | End: 2021-10-29

## 2021-10-29 RX ORDER — CALCIUM GLUCONATE 100 MG/ML
2 VIAL (ML) INTRAVENOUS ONCE
Refills: 0 | Status: COMPLETED | OUTPATIENT
Start: 2021-10-29 | End: 2021-10-29

## 2021-10-29 RX ORDER — SODIUM CHLORIDE 9 MG/ML
1000 INJECTION, SOLUTION INTRAVENOUS
Refills: 0 | Status: DISCONTINUED | OUTPATIENT
Start: 2021-10-29 | End: 2021-10-30

## 2021-10-29 RX ORDER — MAGNESIUM SULFATE 500 MG/ML
2 VIAL (ML) INJECTION ONCE
Refills: 0 | Status: COMPLETED | OUTPATIENT
Start: 2021-10-29 | End: 2021-10-29

## 2021-10-29 RX ORDER — CHLORHEXIDINE GLUCONATE 213 G/1000ML
15 SOLUTION TOPICAL EVERY 12 HOURS
Refills: 0 | Status: DISCONTINUED | OUTPATIENT
Start: 2021-10-29 | End: 2021-10-29

## 2021-10-29 RX ORDER — SOD SULF/SODIUM/NAHCO3/KCL/PEG
1000 SOLUTION, RECONSTITUTED, ORAL ORAL
Refills: 0 | Status: DISCONTINUED | OUTPATIENT
Start: 2021-10-29 | End: 2021-10-29

## 2021-10-29 RX ORDER — DEXTROSE 50 % IN WATER 50 %
25 SYRINGE (ML) INTRAVENOUS ONCE
Refills: 0 | Status: COMPLETED | OUTPATIENT
Start: 2021-10-29 | End: 2021-10-29

## 2021-10-29 RX ORDER — ENOXAPARIN SODIUM 100 MG/ML
40 INJECTION SUBCUTANEOUS DAILY
Refills: 0 | Status: DISCONTINUED | OUTPATIENT
Start: 2021-10-29 | End: 2021-11-04

## 2021-10-29 RX ADMIN — Medication 3000 MILLILITER(S): at 04:45

## 2021-10-29 RX ADMIN — CYPROHEPTADINE HYDROCHLORIDE 4 MILLIGRAM(S): 4 TABLET ORAL at 18:00

## 2021-10-29 RX ADMIN — Medication 2000 MILLILITER(S): at 14:54

## 2021-10-29 RX ADMIN — Medication 3000 MILLILITER(S): at 07:45

## 2021-10-29 RX ADMIN — PROPOFOL 2.99 MICROGRAM(S)/KG/MIN: 10 INJECTION, EMULSION INTRAVENOUS at 00:14

## 2021-10-29 RX ADMIN — PROPOFOL 2.99 MICROGRAM(S)/KG/MIN: 10 INJECTION, EMULSION INTRAVENOUS at 15:23

## 2021-10-29 RX ADMIN — MIDAZOLAM HYDROCHLORIDE 1.04 MG/KG/HR: 1 INJECTION, SOLUTION INTRAMUSCULAR; INTRAVENOUS at 11:27

## 2021-10-29 RX ADMIN — MIDAZOLAM HYDROCHLORIDE 1.04 MG/KG/HR: 1 INJECTION, SOLUTION INTRAMUSCULAR; INTRAVENOUS at 07:52

## 2021-10-29 RX ADMIN — CHLORHEXIDINE GLUCONATE 15 MILLILITER(S): 213 SOLUTION TOPICAL at 17:31

## 2021-10-29 RX ADMIN — SODIUM CHLORIDE 125 MILLILITER(S): 9 INJECTION INTRAMUSCULAR; INTRAVENOUS; SUBCUTANEOUS at 00:16

## 2021-10-29 RX ADMIN — Medication 3000 MILLILITER(S): at 10:10

## 2021-10-29 RX ADMIN — ENOXAPARIN SODIUM 40 MILLIGRAM(S): 100 INJECTION SUBCUTANEOUS at 11:28

## 2021-10-29 RX ADMIN — MIDAZOLAM HYDROCHLORIDE 1.04 MG/KG/HR: 1 INJECTION, SOLUTION INTRAMUSCULAR; INTRAVENOUS at 00:15

## 2021-10-29 RX ADMIN — Medication 200 GRAM(S): at 15:47

## 2021-10-29 RX ADMIN — ACTIVATED CHARCOAL 50 GRAM(S): 208 SUSPENSION ORAL at 00:14

## 2021-10-29 RX ADMIN — Medication 25 GRAM(S): at 22:34

## 2021-10-29 RX ADMIN — SODIUM CHLORIDE 75 MILLILITER(S): 9 INJECTION, SOLUTION INTRAVENOUS at 21:54

## 2021-10-29 RX ADMIN — Medication 50 MILLIEQUIVALENT(S): at 15:47

## 2021-10-29 RX ADMIN — Medication 4000 MILLILITER(S): at 02:41

## 2021-10-29 RX ADMIN — Medication 2000 MILLILITER(S): at 16:30

## 2021-10-29 RX ADMIN — CYPROHEPTADINE HYDROCHLORIDE 12 MILLIGRAM(S): 4 TABLET ORAL at 10:12

## 2021-10-29 RX ADMIN — FAMOTIDINE 20 MILLIGRAM(S): 10 INJECTION INTRAVENOUS at 18:00

## 2021-10-29 RX ADMIN — MIDAZOLAM HYDROCHLORIDE 1.04 MG/KG/HR: 1 INJECTION, SOLUTION INTRAMUSCULAR; INTRAVENOUS at 21:53

## 2021-10-29 RX ADMIN — SODIUM CHLORIDE 150 MILLILITER(S): 9 INJECTION, SOLUTION INTRAVENOUS at 21:55

## 2021-10-29 RX ADMIN — PROPOFOL 2.99 MICROGRAM(S)/KG/MIN: 10 INJECTION, EMULSION INTRAVENOUS at 07:52

## 2021-10-29 RX ADMIN — Medication 50 MILLIEQUIVALENT(S): at 20:10

## 2021-10-29 RX ADMIN — Medication 3000 MILLILITER(S): at 11:05

## 2021-10-29 RX ADMIN — Medication 3000 MILLILITER(S): at 05:41

## 2021-10-29 RX ADMIN — CHLORHEXIDINE GLUCONATE 1 APPLICATION(S): 213 SOLUTION TOPICAL at 05:42

## 2021-10-29 RX ADMIN — Medication 25 GRAM(S): at 15:48

## 2021-10-29 RX ADMIN — SODIUM CHLORIDE 125 MILLILITER(S): 9 INJECTION, SOLUTION INTRAVENOUS at 07:51

## 2021-10-29 RX ADMIN — Medication 4000 MILLILITER(S): at 00:15

## 2021-10-29 RX ADMIN — CHLORHEXIDINE GLUCONATE 15 MILLILITER(S): 213 SOLUTION TOPICAL at 05:40

## 2021-10-29 RX ADMIN — FAMOTIDINE 20 MILLIGRAM(S): 10 INJECTION INTRAVENOUS at 05:41

## 2021-10-29 RX ADMIN — Medication 50 MILLIEQUIVALENT(S): at 17:31

## 2021-10-29 NOTE — PROGRESS NOTE ADULT - ASSESSMENT
25 year old F hx of borderline personality disorder, anxiety, depression, substance abuse, prior SI by ingestion, presents to ED with ingestions. Patient reported taking Venlafaxine 150mg x 20 tab and bupropion 100mg x 20 tabs at ~3pm. Denies other co-ingestions. In the ED, the patient is asymptomatic, with normal vitals and ekg, baseline mental status, afebrile, no hyperreflexia or clonus. Patient intubated in the ICU as per Toxicology, had hyperreflexia and some myoclonus and was started on Cyproheptadine.    # Drug Overdose with Venlafaxine and Bupropion  # Serotonin Syndrome  # Suicide Attempt  # H/O Suicide Attempt  # Borderline personality disorder  # Anxiety/depression  # H/O Substance Abuse  - Took 150x20 of Venlafaxine, Bupropion 100 x 20  - Hyperreflexia on exam, short run of NSVT on EKG  - s/p Activated charcoal given 50grams via OGT as per toxicology  - s/p intubation by anesthesia  - s/p Polyethylene Glycol soln given as per toxicology  - Continue with Propofol and Versed  - Will change fluids to   - OGT / Rosas / Rectal tube placed overnight  - Serial EKG's performed q 1h x 3 as per toxicology  - Neurology consult pending  - will monitor for Torsades   - RN aware to monitor for signs of Serotonin syndrome  - CBC and BMP    DVT: Lovenox  GI: Protonix  Dispo: Acute 25 year old F hx of borderline personality disorder, anxiety, depression, substance abuse, prior SI by ingestion, presents to ED with ingestions. Patient reported taking Venlafaxine 150mg x 20 tab and bupropion 100mg x 20 tabs at ~3pm. Denies other co-ingestions. In the ED, the patient is asymptomatic, with normal vitals and ekg, baseline mental status, afebrile, no hyperreflexia or clonus. Patient intubated in the ICU as per Toxicology, had hyperreflexia and some myoclonus and was started on Cyproheptadine.    # Drug Overdose with Venlafaxine and Bupropion  # Serotonin Syndrome  # Suicide Attempt  # H/O Suicide Attempt  # Borderline personality disorder  # Anxiety/depression  # H/O Substance Abuse  - Took 150x20 of Venlafaxine, Bupropion 100 x 20  - Hyperreflexia on exam, short run of NSVT on EKG  - s/p Activated charcoal given 50grams via OGT as per toxicology  - s/p intubation by anesthesia  - s/p Polyethylene Glycol soln given as per toxicology  - Continue with Propofol and Versed  - Will change fluids to   - Will give Cyproheptadine 12 and then 4 q6  - OGT / Rosas / Rectal tube placed overnight  - Serial EKG's performed q 1h x 3 as per toxicology  - Neurology consult pending  - will monitor for Torsades   - RN aware to monitor for signs of Serotonin syndrome  - CBC and BMP    DVT: Lovenox  GI: Protonix  Dispo: Acute 25 year old F hx of borderline personality disorder, anxiety, depression, substance abuse, prior SI by ingestion, presents to ED with ingestions. Patient reported taking Venlafaxine 150mg x 20 tab and bupropion 100mg x 20 tabs at ~3pm. Denies other co-ingestions. In the ED, the patient is asymptomatic, with normal vitals and ekg, baseline mental status, afebrile, no hyperreflexia or clonus. Patient intubated in the ICU as per Toxicology, had hyperreflexia and some myoclonus and was started on Cyproheptadine.    # Drug Overdose with Venlafaxine and Bupropion  # Serotonin Syndrome  # Suicide Attempt  # H/O Suicide Attempt  # Borderline personality disorder  # Anxiety/depression  # H/O Substance Abuse  - Took 150x20 of Venlafaxine, Bupropion 100 x 20  - Hyperreflexia on exam, short run of NSVT on EKG  - s/p Activated charcoal given 50grams via OGT as per toxicology  - s/p intubation by anesthesia  - s/p Polyethylene Glycol soln given as per toxicology  - Continue with Propofol and Versed  - Will change fluids to  with D5  - Golytely q1h until rectal effluent clear or total of 10L effluent is excreted  - Will give Cyproheptadine 12 and then 4 q6  - OGT / Rosas / Rectal tube placed overnight  - Serial EKG's performed q 1h x 3 as per toxicology  - Neurology following  - Electrolytes q6  - will monitor for Torsades   - RN aware to monitor for signs of Serotonin syndrome  - CBC and BMP    # Seizures  - Likely from overdose  - Ativan PRN  - VEEG    DVT: Lovenox  GI: Protonix  Dispo: Acute 25 year old F hx of borderline personality disorder, anxiety, depression, substance abuse, prior SI by ingestion, presents to ED with ingestions. Patient reported taking Venlafaxine 150mg x 20 tab and bupropion 100mg x 20 tabs at ~3pm. Denies other co-ingestions. In the ED, the patient is asymptomatic, with normal vitals and ekg, baseline mental status, afebrile, no hyperreflexia or clonus. Patient intubated in the ICU as per Toxicology, had hyperreflexia and some myoclonus and was started on Cyproheptadine.    # Drug Overdose with Venlafaxine and Bupropion  # Serotonin Syndrome  # Suicide Attempt  # H/O Suicide Attempt  # Borderline personality disorder  # Anxiety/depression  # H/O Substance Abuse  - Took 150x20 of Venlafaxine, Bupropion 100 x 20  - Hyperreflexia on exam, short run of NSVT on EKG  - s/p Activated charcoal given 50grams via OGT as per toxicology  - s/p intubation by anesthesia  - s/p Polyethylene Glycol soln given as per toxicology  - Continue with Propofol and Versed  - Will change fluids to  with D5  - Golytely q1h until rectal effluent clear or total of 10L effluent is excreted  - Will give Cyproheptadine 12 and then 4 q6  - OGT / Rosas / Rectal tube placed overnight  - Serial EKG's performed q 1h x 3 as per toxicology  - Neurology following  - Electrolytes q6  - will monitor for Torsades   - RN aware to monitor for signs of Serotonin syndrome  - CBC and BMP    # Seizures  - Likely from overdose  - Ativan PRN  - Will get VEEG    # Hypocalcemia  - Positive Trousseau + Episode of seizure  - Will supplement with IV Calcium Gluconate 2g  - Repeat Ca and ionized calcium levels  - Will need strict monitoring of Mg and Ca as patient given Golytely to clear her GI tract    DVT: Lovenox  GI: Protonix  Dispo: Acute 25 year old F hx of borderline personality disorder, anxiety, depression, substance abuse, prior SI by ingestion, presents to ED with ingestions. Patient reported taking Venlafaxine 150mg x 20 tab and bupropion 100mg x 20 tabs at ~3pm. Denies other co-ingestions. In the ED, the patient is asymptomatic, with normal vitals and ekg, baseline mental status, afebrile, no hyperreflexia or clonus. Patient intubated in the ICU as per Toxicology, had hyperreflexia and some myoclonus and was started on Cyproheptadine.    # Drug Overdose with Venlafaxine and Bupropion  # Serotonin Syndrome  # Suicide Attempt  # H/O Suicide Attempt  # Borderline personality disorder  # Anxiety/depression  # H/O Substance Abuse  - Took 150x20 of Venlafaxine, Bupropion 100 x 20  - Hyperreflexia on exam, short run of NSVT on EKG  - s/p Activated charcoal given 50grams via OGT as per toxicology  - s/p intubation by anesthesia  - s/p Polyethylene Glycol soln given as per toxicology  - Continue with Propofol and Versed  - Will change fluids to  with D5  - Golytely q1h until rectal effluent clear or total of 10L effluent is excreted  - Will give Cyproheptadine 12 and then 4 q6  - OGT / Rosas / Rectal tube placed overnight  - Serial EKG's performed q 1h x 3 as per toxicology  - Neurology following  - Electrolytes q6  - will monitor for Torsades   - RN aware to monitor for signs of Serotonin syndrome  - CBC and BMP    # Seizures  - Likely from overdose  - Ativan PRN  - Will get VEEG    # Hypocalcemia  - Positive Trousseau + Episode of seizure  - Will supplement with IV Calcium Gluconate 2g  - Repeat Ca and ionized calcium levels  - Will need strict monitoring of Mg and Ca as patient given Golytely to clear her GI tract    # Possible Hypokalemia  - K 3.7 with hemolyzed sample  - Will supplement and follow K    DVT: Lovenox  GI: Protonix  Dispo: Acute

## 2021-10-29 NOTE — PROGRESS NOTE ADULT - SUBJECTIVE AND OBJECTIVE BOX
SUBJECTIVE:    Patient is a 25y old Female who presents with a chief complaint of Drug overdose (28 Oct 2021 21:56)    Currently admitted to medicine with the primary diagnosis of Drug overdose       Today is hospital day 1d. This morning she is resting comfortably in bed and reports no new issues or overnight events.     INTERVAL EVENTS: Intubated and sedated    PAST MEDICAL & SURGICAL HISTORY  H/O suicide attempt    Anxiety and depression    No significant past surgical history        ALLERGIES:  No Known Allergies    MEDICATIONS:  STANDING MEDICATIONS  chlorhexidine 0.12% Liquid 15 milliLiter(s) Oral Mucosa every 12 hours  chlorhexidine 4% Liquid 1 Application(s) Topical <User Schedule>  cyproheptadine 12 milliGRAM(s) Oral once  cyproheptadine 4 milliGRAM(s) Oral every 6 hours  famotidine Injectable 20 milliGRAM(s) IV Push every 12 hours  lactated ringers. 1000 milliLiter(s) IV Continuous <Continuous>  midazolam Infusion 0.02 mG/kG/Hr IV Continuous <Continuous>  polyethylene glycol/electrolyte Solution 3000 milliLiter(s) Oral every 2 hours  propofol Infusion 10 MICROgram(s)/kG/Min IV Continuous <Continuous>    PRN MEDICATIONS  LORazepam   Injectable 2.5 milliGRAM(s) IV Push once PRN    VITALS:   T(F): 95.6  HR: 90  BP: 116/79  RR: 12  SpO2: 100%    LABS:                        10.5   10.49 )-----------( 251      ( 29 Oct 2021 04:47 )             32.7     10-29    138  |  106  |  5<L>  ----------------------------<  67<L>  4.4   |  18  |  0.6<L>    Ca    8.0<L>      29 Oct 2021 04:47  Mg     2.0     10-29    TPro  5.8<L>  /  Alb  3.7  /  TBili  <0.2  /  DBili  <0.2  /  AST  17  /  ALT  9   /  AlkPhos  44  10-29      Urinalysis Basic - ( 28 Oct 2021 16:27 )    Color: Yellow / Appearance: Clear / S.020 / pH: x  Gluc: x / Ketone: Negative  / Bili: Negative / Urobili: 0.2 mg/dL   Blood: x / Protein: Negative mg/dL / Nitrite: Negative   Leuk Esterase: Negative / RBC: 3-5 /HPF / WBC x   Sq Epi: x / Non Sq Epi: Many /HPF / Bacteria: TNTC /HPF      ABG - ( 29 Oct 2021 07:24 )  pH, Arterial: 7.40  pH, Blood: x     /  pCO2: 42    /  pO2: 153   / HCO3: 26    / Base Excess: 1.0   /  SaO2: x                 Creatine Kinase, Serum: 153 U/L (10-29-21 @ 03:25)      CARDIAC MARKERS ( 29 Oct 2021 03:25 )  x     / x     / 153 U/L / x     / x          RADIOLOGY:    PHYSICAL EXAM:  GEN: No acute distress  PULM/CHEST: Clear to auscultation bilaterally, no rales, rhonchi or wheezes   CVS: Regular rate and rhythm, S1-S2, no murmurs  ABD: Soft, non-tender, non-distended, +BS  EXT: No edema  NEURO: AAOx3    Rosas Catheter:   Indwelling Urethral Catheter:     Connect To:  Straight Drainage/Gravity    Indication:  Urine Output Monitoring in Critically Ill (10-28-21 @ 23:19) (not performed) [Active]       SUBJECTIVE:    Patient is a 25y old Female who presents with a chief complaint of Drug overdose (28 Oct 2021 21:56)    Currently admitted to medicine with the primary diagnosis of Drug overdose       Today is hospital day 1d. This morning she is resting comfortably in bed and reports no new issues or overnight events.     INTERVAL EVENTS: Intubated and sedated.    PAST MEDICAL & SURGICAL HISTORY  H/O suicide attempt    Anxiety and depression    No significant past surgical history        ALLERGIES:  No Known Allergies    MEDICATIONS:  STANDING MEDICATIONS  chlorhexidine 0.12% Liquid 15 milliLiter(s) Oral Mucosa every 12 hours  chlorhexidine 4% Liquid 1 Application(s) Topical <User Schedule>  cyproheptadine 12 milliGRAM(s) Oral once  cyproheptadine 4 milliGRAM(s) Oral every 6 hours  famotidine Injectable 20 milliGRAM(s) IV Push every 12 hours  lactated ringers. 1000 milliLiter(s) IV Continuous <Continuous>  midazolam Infusion 0.02 mG/kG/Hr IV Continuous <Continuous>  polyethylene glycol/electrolyte Solution 3000 milliLiter(s) Oral every 2 hours  propofol Infusion 10 MICROgram(s)/kG/Min IV Continuous <Continuous>    PRN MEDICATIONS  LORazepam   Injectable 2.5 milliGRAM(s) IV Push once PRN    VITALS:   T(F): 95.6  HR: 90  BP: 116/79  RR: 12  SpO2: 100%    LABS:                        10.5   10.49 )-----------( 251      ( 29 Oct 2021 04:47 )             32.7     10-29    138  |  106  |  5<L>  ----------------------------<  67<L>  4.4   |  18  |  0.6<L>    Ca    8.0<L>      29 Oct 2021 04:47  Mg     2.0     10-29    TPro  5.8<L>  /  Alb  3.7  /  TBili  <0.2  /  DBili  <0.2  /  AST  17  /  ALT  9   /  AlkPhos  44  10-29      Urinalysis Basic - ( 28 Oct 2021 16:27 )    Color: Yellow / Appearance: Clear / S.020 / pH: x  Gluc: x / Ketone: Negative  / Bili: Negative / Urobili: 0.2 mg/dL   Blood: x / Protein: Negative mg/dL / Nitrite: Negative   Leuk Esterase: Negative / RBC: 3-5 /HPF / WBC x   Sq Epi: x / Non Sq Epi: Many /HPF / Bacteria: TNTC /HPF      ABG - ( 29 Oct 2021 07:24 )  pH, Arterial: 7.40  pH, Blood: x     /  pCO2: 42    /  pO2: 153   / HCO3: 26    / Base Excess: 1.0   /  SaO2: x                 Creatine Kinase, Serum: 153 U/L (10-29-21 @ 03:25)      CARDIAC MARKERS ( 29 Oct 2021 03:25 )  x     / x     / 153 U/L / x     / x          RADIOLOGY:    PHYSICAL EXAM:  GEN: No acute distress  PULM/CHEST: Clear to auscultation bilaterally, no rales, rhonchi or wheezes   CVS: Regular rate and rhythm, S1-S2  ABD: Soft, non-tender, non-distended, +BS  EXT: No edema  NEURO: AAOx3    Rosas Catheter:   Indwelling Urethral Catheter:     Connect To:  Straight Drainage/Gravity    Indication:  Urine Output Monitoring in Critically Ill (10-28-21 @ 23:19) (not performed) [Active]

## 2021-10-29 NOTE — PROGRESS NOTE ADULT - SUBJECTIVE AND OBJECTIVE BOX
Patient is sedated on ventilator       T(F): 96.9 (10-29-21 @ 11:00), Max: 99.4 (10-28-21 @ 20:29)  HR: 87 (10-29-21 @ 12:00)  BP: 118/78 (10-29-21 @ 11:00)  RR: 12 (10-29-21 @ 12:00)  SpO2: 100% (10-29-21 @ 12:00) (100% - 100%)    PHYSICAL EXAM:  GENERAL: NAD  HEAD:  Atraumatic, Normocephalic  NERVOUS SYSTEM:  no focal deficits   CHEST/LUNG: Clear to percussion bilaterally; No rales, rhonchi, wheezing, or rubs  HEART: Regular rate and rhythm; No murmurs, rubs, or gallops  ABDOMEN: Soft, Nontender, Nondistended; Bowel sounds present  EXTREMITIES:  2+ Peripheral Pulses, No clubbing, cyanosis, or edema    LABS  10-29    138  |  106  |  5<L>  ----------------------------<  67<L>  4.4   |  18  |  0.6<L>    Ca    8.0<L>      29 Oct 2021 04:47  Mg     2.0     10-29    TPro  5.8<L>  /  Alb  3.7  /  TBili  <0.2  /  DBili  <0.2  /  AST  17  /  ALT  9   /  AlkPhos  44  10-29                          10.5   10.49 )-----------( 251      ( 29 Oct 2021 04:47 )             32.7     < from: 12 Lead ECG (10.29.21 @ 09:36) >  Ventricular Rate 89 BPM    Atrial Rate 89 BPM    P-R Interval 150 ms    QRS Duration 88 ms    Q-T Interval 402 ms    QTC Calculation(Bazett) 489 ms    P Axis 76 degrees    R Axis 82 degrees    T Axis 54 degrees    Diagnosis Line Normal sinus rhythm    < end of copied text >    Mode: Auto Mode: PRVC/ Volume Support  RR (machine): 12  TV (machine): 400  FiO2: 40  PEEP: 5    CARDIAC ENZYMES  Creatine Kinase, Serum: 153 (10-29 @ 03:25)      RADIOLOGY  < from: Xray Chest 1 View-PORTABLE IMMEDIATE (Xray Chest 1 View-PORTABLE IMMEDIATE .) (10.29.21 @ 00:38) >  Impression:    No radiographic evidence of acute cardiopulmonary disease.    < end of copied text >    MEDICATIONS  (STANDING):  chlorhexidine 0.12% Liquid 15 milliLiter(s) Oral Mucosa every 12 hours  chlorhexidine 4% Liquid 1 Application(s) Topical <User Schedule>  cyproheptadine 4 milliGRAM(s) Oral every 6 hours  dextrose 5%. 1000 milliLiter(s) (50 mL/Hr) IV Continuous <Continuous>  enoxaparin Injectable 40 milliGRAM(s) SubCutaneous daily  famotidine Injectable 20 milliGRAM(s) IV Push every 12 hours  lactated ringers. 1000 milliLiter(s) (125 mL/Hr) IV Continuous <Continuous>  midazolam Infusion 0.02 mG/kG/Hr (1.04 mL/Hr) IV Continuous <Continuous>  polyethylene glycol/electrolyte Solution. 2000 milliLiter(s) Oral <User Schedule>  propofol Infusion 10 MICROgram(s)/kG/Min (2.99 mL/Hr) IV Continuous <Continuous>    MEDICATIONS  (PRN):  LORazepam   Injectable 2.5 milliGRAM(s) IV Push once PRN Seizure activity

## 2021-10-29 NOTE — CONSULT NOTE ADULT - SUBJECTIVE AND OBJECTIVE BOX
Neurology Consult  Pt is a 25F w/ PMH Borderline personality disorder, anxiety/depression and substance abuse admitted s/p overdose w/ Wellbutrin 2g and Effexor 2g. Neurology was consulted 2/2 witnessed seizure in the ED. Upon arrival to the ED, pt was awake and alert, but shortly after seizure activity the patient was intubated and sedated per recommendations from toxicology team. So pt is now sedated on propofol and versed. Pt was tachycardic in ED to 150s, which is now resolved. Ativan PRN on board for seizure activity. There is concern for development of serotonin syndrome.     HPI:  25 year old F hx of borderline personality disorder, anxiety, depression, substance abuse, prior SI by ingestion, presents to ED with ingestions. Patient reported taking Venlafaxine 150mg x 20 tab and bupropion 100mg x 20 tabs at ~3pm.   (28 Oct 2021 21:56)      PAST MEDICAL & SURGICAL HISTORY:  H/O suicide attempt  Anxiety and depression  No significant past surgical history      FAMILY HISTORY:  No pertinent family history in first degree relatives        Social History: +smoker, +polysubstance abuse     Allergies  No Known Allergies      MEDICATIONS  (STANDING):  chlorhexidine 0.12% Liquid 15 milliLiter(s) Oral Mucosa every 12 hours  chlorhexidine 4% Liquid 1 Application(s) Topical <User Schedule>  cyproheptadine 12 milliGRAM(s) Oral once  cyproheptadine 4 milliGRAM(s) Oral every 6 hours  enoxaparin Injectable 40 milliGRAM(s) SubCutaneous daily  famotidine Injectable 20 milliGRAM(s) IV Push every 12 hours  lactated ringers. 1000 milliLiter(s) (125 mL/Hr) IV Continuous <Continuous>  midazolam Infusion 0.02 mG/kG/Hr (1.04 mL/Hr) IV Continuous <Continuous>  polyethylene glycol/electrolyte Solution 3000 milliLiter(s) Oral every 2 hours  propofol Infusion 10 MICROgram(s)/kG/Min (2.99 mL/Hr) IV Continuous <Continuous>    MEDICATIONS  (PRN):  LORazepam   Injectable 2.5 milliGRAM(s) IV Push once PRN Seizure activity      Review of systems:    Unable to obtain 2/2 mental status       Vital Signs Last 24 Hrs  T(C): 35.3 (29 Oct 2021 07:01), Max: 37.4 (28 Oct 2021 20:29)  T(F): 95.6 (29 Oct 2021 07:01), Max: 99.4 (28 Oct 2021 20:29)  HR: 89 (29 Oct 2021 08:00) (86 - 157)  BP: 111/71 (29 Oct 2021 08:00) (105/71 - 160/70)  BP(mean): 87 (29 Oct 2021 08:00) (84 - 101)  RR: 12 (29 Oct 2021 08:00) (12 - 21)  SpO2: 100% (29 Oct 2021 08:00) (100% - 100%)    Examination:  General:  Appearance is consistent with chronologic age.  No abnormal facies.   Cognitive/Language:  Unable to assess   Eyes: PERRL  Motor examination: No withdrawal to painful stimuli likely 2/2 oversedation  Reflexes: Hyperreflexia   Sensory examination: Unable to assess       Labs:   CBC Full  -  ( 29 Oct 2021 04:47 )  WBC Count : 10.49 K/uL  RBC Count : 3.38 M/uL  Hemoglobin : 10.5 g/dL  Hematocrit : 32.7 %  Platelet Count - Automated : 251 K/uL  Mean Cell Volume : 96.7 fL  Mean Cell Hemoglobin : 31.1 pg  Mean Cell Hemoglobin Concentration : 32.1 g/dL  Auto Neutrophil # : x  Auto Lymphocyte # : x  Auto Monocyte # : x  Auto Eosinophil # : x  Auto Basophil # : x  Auto Neutrophil % : x  Auto Lymphocyte % : x  Auto Monocyte % : x  Auto Eosinophil % : x  Auto Basophil % : x    10-29    138  |  106  |  5<L>  ----------------------------<  67<L>  4.4   |  18  |  0.6<L>    Ca    8.0<L>      29 Oct 2021 04:47  Mg     2.0     10-29    TPro  5.8<L>  /  Alb  3.7  /  TBili  <0.2  /  DBili  <0.2  /  AST  17  /  ALT  9   /  AlkPhos  44  10-29    LIVER FUNCTIONS - ( 29 Oct 2021 04:47 )  Alb: 3.7 g/dL / Pro: 5.8 g/dL / ALK PHOS: 44 U/L / ALT: 9 U/L / AST: 17 U/L / GGT: x             Urinalysis Basic - ( 28 Oct 2021 16:27 )    Color: Yellow / Appearance: Clear / S.020 / pH: x  Gluc: x / Ketone: Negative  / Bili: Negative / Urobili: 0.2 mg/dL   Blood: x / Protein: Negative mg/dL / Nitrite: Negative   Leuk Esterase: Negative / RBC: 3-5 /HPF / WBC x   Sq Epi: x / Non Sq Epi: Many /HPF / Bacteria: TNTC /HPF

## 2021-10-29 NOTE — CHART NOTE - NSCHARTNOTEFT_GEN_A_CORE
psychiatry  consult  was requested:    Chart reviewed , discussed with  the staff.     25 year old  female  with h/o borderline  personality  disorder and  depression ,  admitted to ICU following overdose with  venlafaxine and Wellbutrin  and she  intubated. s/p seizure.     patient is  sedated and intubated.  not able to do complete psych evaluation due to her being sedated .     plan:  psych  follow up when patient is awake alert and bale to participate in interview .

## 2021-10-29 NOTE — CONSULT NOTE ADULT - ATTENDING COMMENTS
Patient seen and examined and agree with above except as noted.  Patients history, notes, labs, imaging, vitals and meds reviewed personally.  Patient intubated and sedated and exam shows no response however on sedation    Plan as above
Attending Statement: I have personally performed a face to face diagnostic evaluation on this patient. The patient is suffering from:  Acute Respiratory Failure SP intubation  seizure & Toxic Metabolic Encephalopathy  I have reviewed the above note and agree with the history, exam and suggestions for care, except as I have noted in the text. I have amended the treatment plans as necessary.

## 2021-10-29 NOTE — CHART NOTE - NSCHARTNOTEFT_GEN_A_CORE
OFE THAKUR notified by RN for Patient with abnormal arrythmia.  Patient since admitted to ICU, was intubated as per direction of Toxicology consult.  Pt currently sedated on Propofol and Versed with sinus tachycardia on monitor @ 100.      T(C): 35.7 (10-29-21 @ 03:14), Max: 37.4 (10-28-21 @ 20:29)  HR: 98 (10-29-21 @ 03:14) (98 - 157)  BP: 117/83 (10-29-21 @ 03:14) (117/74 - 160/70)  RR: 21 (10-29-21 @ 03:14) (16 - 21)  SpO2: 100% (10-29-21 @ 03:14) (100% - 100%)  Wt(kg): --    No Known Allergies      LABS:                        11.3   9.27  )-----------( 251      ( 29 Oct 2021 03:25 )             34.3     10-28    138  |  102  |  11  ----------------------------<  100<H>  4.8   |  26  |  0.7    Ca    9.3      28 Oct 2021 16:27  Mg     2.1     10-28    TPro  7.5  /  Alb  4.9  /  TBili  <0.2  /  DBili  x   /  AST  16  /  ALT  10  /  AlkPhos  72  1028      Urinalysis Basic - ( 28 Oct 2021 16:27 )    Color: Yellow / Appearance: Clear / S.020 / pH: x  Gluc: x / Ketone: Negative  / Bili: Negative / Urobili: 0.2 mg/dL   Blood: x / Protein: Negative mg/dL / Nitrite: Negative   Leuk Esterase: Negative / RBC: 3-5 /HPF / WBC x   Sq Epi: x / Non Sq Epi: Many /HPF / Bacteria: TNTC /HPF      CAPILLARY BLOOD GLUCOSE      POCT Blood Glucose.: 105 mg/dL (28 Oct 2021 22:04)    ABG - ( 29 Oct 2021 00:22 )  pH, Arterial: 7.46  pH, Blood: x     /  pCO2: 31    /  pO2: 156   / HCO3: 22    / Base Excess: -0.9  /  SaO2: 99.0          Urinalysis Basic - ( 28 Oct 2021 16:27 )    Color: Yellow / Appearance: Clear / S.020 / pH: x  Gluc: x / Ketone: Negative  / Bili: Negative / Urobili: 0.2 mg/dL   Blood: x / Protein: Negative mg/dL / Nitrite: Negative   Leuk Esterase: Negative / RBC: 3-5 /HPF / WBC x   Sq Epi: x / Non Sq Epi: Many /HPF / Bacteria: TNTC /HPF        RADIOLOGY & ADDITIONAL TESTS:    PHYSICAL EXAM:  GENERAL: NAD, well-groomed, well-developed - sedated  EYES: Pupils reactive and dilated, conjunctiva and sclera clear  NERVOUS SYSTEM:  responsive to verbal and painful stimuli - remains sedated  CHEST/LUNG: Clear to percussion bilaterally; No rales, rhonchi, wheezing, or rubs  HEART: Regular rate and rhythm;  ABDOMEN: Soft, Nontender, Nondistended; Bowel sounds present  EXTREMITIES:  2+ Peripheral Pulses, No clubbing, cyanosis, or edema        Assesment/Plan - discussed w/ Dr Short  Intentional Ingestion 2gm venlafaxine and 2gm bupropion     Plan:  - s/p intubation by anesthesia  - Propofol & versed sedation  - IVF NS @125  - CXR confirmed placement   - OGT / Rosas / Rectal tube placed strict I/O's  - Serial EKG's performed q 1h x 3 as per toxicology (no change in QRS noted <120)  - Next EKG @ 0600  - Activated charcoal given 50grams via OGT as per toxicology  - Polyethylene Glycol soln given as per toxicology  - Neurology consult pending  - will monitor for Torsades   - RN aware to monitor for signs of Serotonin syndrome - will initiate cyproheptadine 12mg orally, followed by 4-8 mg q4-6 hours. if needed  - Stat CBC / BMP/ Mg pending  - will monitor PA Medicine  Ria [id number]      Patient since admitted to ICU, was intubated as per direction of Toxicology consult.  Pt currently sedated on Propofol and Versed with sinus tachycardia on monitor @ 100,   initially rate was Sinus tach in ED @150-160.  Pt is s/p seizure in ED , given Ativan.      T(C): 35.7 (10-29-21 @ 03:14), Max: 37.4 (10-28-21 @ 20:29)  HR: 98 (10-29-21 @ 03:14) (98 - 157)  BP: 117/83 (10-29-21 @ 03:14) (117/74 - 160/70)  RR: 21 (10-29-21 @ 03:14) (16 - 21)  SpO2: 100% (10-29-21 @ 03:14) (100% - 100%)  Wt(kg): --    No Known Allergies      LABS:                        11.3   9.27  )-----------( 251      ( 29 Oct 2021 03:25 )             34.3     10-28    138  |  102  |  11  ----------------------------<  100<H>  4.8   |  26  |  0.7    Ca    9.3      28 Oct 2021 16:27  Mg     2.1     10-28    TPro  7.5  /  Alb  4.9  /  TBili  <0.2  /  DBili  x   /  AST  16  /  ALT  10  /  AlkPhos  72  10      Urinalysis Basic - ( 28 Oct 2021 16:27 )    Color: Yellow / Appearance: Clear / S.020 / pH: x  Gluc: x / Ketone: Negative  / Bili: Negative / Urobili: 0.2 mg/dL   Blood: x / Protein: Negative mg/dL / Nitrite: Negative   Leuk Esterase: Negative / RBC: 3-5 /HPF / WBC x   Sq Epi: x / Non Sq Epi: Many /HPF / Bacteria: TNTC /HPF      CAPILLARY BLOOD GLUCOSE      POCT Blood Glucose.: 105 mg/dL (28 Oct 2021 22:04)    ABG - ( 29 Oct 2021 00:22 )  pH, Arterial: 7.46  pH, Blood: x     /  pCO2: 31    /  pO2: 156   / HCO3: 22    / Base Excess: -0.9  /  SaO2: 99.0          Urinalysis Basic - ( 28 Oct 2021 16:27 )    Color: Yellow / Appearance: Clear / S.020 / pH: x  Gluc: x / Ketone: Negative  / Bili: Negative / Urobili: 0.2 mg/dL   Blood: x / Protein: Negative mg/dL / Nitrite: Negative   Leuk Esterase: Negative / RBC: 3-5 /HPF / WBC x   Sq Epi: x / Non Sq Epi: Many /HPF / Bacteria: TNTC /HPF        RADIOLOGY & ADDITIONAL TESTS:    PHYSICAL EXAM:  GENERAL: NAD, well-groomed, well-developed - sedated  EYES: Pupils reactive and dilated, conjunctiva and sclera clear  NERVOUS SYSTEM:  responsive to verbal and painful stimuli - remains sedated  CHEST/LUNG: Clear to percussion bilaterally; No rales, rhonchi, wheezing, or rubs  HEART: Regular rate and rhythm;  ABDOMEN: Soft, Nontender, Nondistended; Bowel sounds present  EXTREMITIES:  2+ Peripheral Pulses, No clubbing, cyanosis, or edema        Assesment/Plan - discussed w/ Dr Short  Intentional Ingestion 2gm venlafaxine and 2gm bupropion     Plan:  - s/p intubation by anesthesia  - Propofol & versed sedation  - IVF NS @125  - CXR confirmed placement   - OGT / Rosas / Rectal tube placed strict I/O's  - Serial EKG's performed q 1h x 3 as per toxicology (no change in QRS noted <120)  - Next EKG @ 0600  - Activated charcoal given 50grams via OGT as per toxicology  - Polyethylene Glycol soln given as per toxicology  - Neurology consult pending  - will monitor for Torsades   - RN aware to monitor for signs of Serotonin syndrome - will initiate cyproheptadine 12mg orally, followed by 4-8 mg q4-6 hours. if needed  - Stat CBC / BMP/ Mg pending  - will monitor PA Medicine  Ria [id number]      Patient since admitted to ICU, was intubated as per direction of Toxicology consult.  Pt currently sedated on Propofol and Versed with sinus tachycardia on monitor @ 100,   initially rate was Sinus tach in ED @150-160.  Pt is s/p seizure in ED , given Ativan.      T(C): 35.7 (10-29-21 @ 03:14), Max: 37.4 (10-28-21 @ 20:29)  HR: 98 (10-29-21 @ 03:14) (98 - 157)  BP: 117/83 (10-29-21 @ 03:14) (117/74 - 160/70)  RR: 21 (10-29-21 @ 03:14) (16 - 21)  SpO2: 100% (10-29-21 @ 03:14) (100% - 100%)  Wt(kg): --    No Known Allergies      LABS:                        11.3   9.27  )-----------( 251      ( 29 Oct 2021 03:25 )             34.3     10-28    138  |  102  |  11  ----------------------------<  100<H>  4.8   |  26  |  0.7    Ca    9.3      28 Oct 2021 16:27  Mg     2.1     10-28    TPro  7.5  /  Alb  4.9  /  TBili  <0.2  /  DBili  x   /  AST  16  /  ALT  10  /  AlkPhos  72  10      Urinalysis Basic - ( 28 Oct 2021 16:27 )    Color: Yellow / Appearance: Clear / S.020 / pH: x  Gluc: x / Ketone: Negative  / Bili: Negative / Urobili: 0.2 mg/dL   Blood: x / Protein: Negative mg/dL / Nitrite: Negative   Leuk Esterase: Negative / RBC: 3-5 /HPF / WBC x   Sq Epi: x / Non Sq Epi: Many /HPF / Bacteria: TNTC /HPF      CAPILLARY BLOOD GLUCOSE      POCT Blood Glucose.: 105 mg/dL (28 Oct 2021 22:04)    ABG - ( 29 Oct 2021 00:22 )  pH, Arterial: 7.46  pH, Blood: x     /  pCO2: 31    /  pO2: 156   / HCO3: 22    / Base Excess: -0.9  /  SaO2: 99.0          Urinalysis Basic - ( 28 Oct 2021 16:27 )    Color: Yellow / Appearance: Clear / S.020 / pH: x  Gluc: x / Ketone: Negative  / Bili: Negative / Urobili: 0.2 mg/dL   Blood: x / Protein: Negative mg/dL / Nitrite: Negative   Leuk Esterase: Negative / RBC: 3-5 /HPF / WBC x   Sq Epi: x / Non Sq Epi: Many /HPF / Bacteria: TNTC /HPF        RADIOLOGY & ADDITIONAL TESTS:    PHYSICAL EXAM:  GENERAL: NAD, well-groomed, well-developed - sedated  EYES: Pupils reactive and dilated,   NERVOUS SYSTEM:  responsive to verbal and painful stimuli - remains sedated  Reactive to threat b/l; + clonus b/l l/e, Negative Babinski sign b/l,   CHEST/LUNG: Clear to percussion bilaterally; No rales, rhonchi, wheezing, or rubs  HEART: Regular rate and rhythm;  ABDOMEN: Soft, Nontender, Nondistended; Bowel sounds present  EXTREMITIES:  2+ Peripheral Pulses, No clubbing, cyanosis, or edema        Assesment/Plan - discussed w/ Dr Short  Intentional Ingestion 2gm venlafaxine and 2gm bupropion     Plan:  - s/p intubation by anesthesia  - Propofol & versed sedation  - IVF NS @125  - CXR confirmed placement   - OGT / Rosas / Rectal tube placed strict I/O's  - Serial EKG's performed q 1h x 3 as per toxicology (no change in QRS noted <120)  - Next EKG @ 0600  - Activated charcoal given 50grams via OGT as per toxicology  - Polyethylene Glycol soln given as per toxicology  - Neurology consult pending  - will monitor for Torsades   - RN aware to monitor for signs of Serotonin syndrome - will initiate cyproheptadine 12mg orally, followed by 4-8 mg q4-6 hours. if needed  - Stat CBC / BMP/ Mg pending  - will monitor PA Medicine  Ria [id number]      Patient since admitted to ICU, was intubated as per direction of Toxicology consult.  Pt currently sedated on Propofol and Versed with sinus tachycardia on monitor @ 100,   initially rate was Sinus tach in ED @150-160.  Pt is s/p seizure in ED , given Ativan.      T(C): 35.7 (10-29-21 @ 03:14), Max: 37.4 (10-28-21 @ 20:29)  HR: 98 (10-29-21 @ 03:14) (98 - 157)  BP: 117/83 (10-29-21 @ 03:14) (117/74 - 160/70)  RR: 21 (10-29-21 @ 03:14) (16 - 21)  SpO2: 100% (10-29-21 @ 03:14) (100% - 100%)  Wt(kg): --    No Known Allergies      LABS:                        11.3   9.27  )-----------( 251      ( 29 Oct 2021 03:25 )             34.3     10-28    138  |  102  |  11  ----------------------------<  100<H>  4.8   |  26  |  0.7    Ca    9.3      28 Oct 2021 16:27  Mg     2.1     10-28    TPro  7.5  /  Alb  4.9  /  TBili  <0.2  /  DBili  x   /  AST  16  /  ALT  10  /  AlkPhos  72  10      Urinalysis Basic - ( 28 Oct 2021 16:27 )    Color: Yellow / Appearance: Clear / S.020 / pH: x  Gluc: x / Ketone: Negative  / Bili: Negative / Urobili: 0.2 mg/dL   Blood: x / Protein: Negative mg/dL / Nitrite: Negative   Leuk Esterase: Negative / RBC: 3-5 /HPF / WBC x   Sq Epi: x / Non Sq Epi: Many /HPF / Bacteria: TNTC /HPF      CAPILLARY BLOOD GLUCOSE      POCT Blood Glucose.: 105 mg/dL (28 Oct 2021 22:04)    ABG - ( 29 Oct 2021 00:22 )  pH, Arterial: 7.46  pH, Blood: x     /  pCO2: 31    /  pO2: 156   / HCO3: 22    / Base Excess: -0.9  /  SaO2: 99.0          Urinalysis Basic - ( 28 Oct 2021 16:27 )    Color: Yellow / Appearance: Clear / S.020 / pH: x  Gluc: x / Ketone: Negative  / Bili: Negative / Urobili: 0.2 mg/dL   Blood: x / Protein: Negative mg/dL / Nitrite: Negative   Leuk Esterase: Negative / RBC: 3-5 /HPF / WBC x   Sq Epi: x / Non Sq Epi: Many /HPF / Bacteria: TNTC /HPF      PHYSICAL EXAM:    GENERAL: NAD, well-groomed, well-developed - sedated  EYES: Pupils reactive and dilated,   CHEST/LUNG: Clear to percussion bilaterally; No rales, rhonchi, wheezing, or rubs  HEART: Regular rate and rhythm;  ABDOMEN: Soft, Nontender, Nondistended; Bowel sounds present  EXTREMITIES:  2+ Peripheral Pulses, No clubbing, cyanosis, or edema    **NERVOUS SYSTEM:  responsive to verbal and painful stimuli - remains sedated  Reactive to threat b/l; ++ clonus b/l l/e, BRISK reflexes b/l l/e.   Negative Babinski sign b/l        Assesment/Plan - discussed w/ Dr Short  Intentional Ingestion 2gm venlafaxine and 2gm bupropion     Plan:  - s/p intubation by anesthesia  - Propofol & versed sedation  - IVF NS @125  - CXR confirmed placement   - OGT / Rosas / Rectal tube placed strict I/O's  - Serial EKG's performed q 1h x 3 as per toxicology (no change in QRS noted <120)  - Next EKG @ 0600  - Activated charcoal given 50grams via OGT as per toxicology  - Polyethylene Glycol soln given as per toxicology  - Neurology consult pending  - will monitor for Torsades   - RN aware to monitor for signs of Serotonin syndrome - will initiate cyproheptadine 12mg orally, followed by 4-8 mg q4-6 hours. post Polyethylene Glycol tx  - CBC and BMP nl, Ck and Mg nl. ( mild hypocalcemia will follow repeat)  - will monitor

## 2021-10-29 NOTE — DIETITIAN INITIAL EVALUATION ADULT. - ENTERAL
Due to high infusion of propofol will not provide EN recommendation until pt is stable enough to begin feeds

## 2021-10-29 NOTE — CONSULT NOTE ADULT - SUBJECTIVE AND OBJECTIVE BOX
Patient is a 25y old  Female who presents with a chief complaint of Drug overdose (28 Oct 2021 21:56)    HPI:  25 year old F hx of borderline personality disorder, anxiety, depression, substance abuse, prior SI by ingestion, presents to ED with ingestions. Patient reported taking Venlafaxine 150mg x 20 tab and bupropion 100mg x 20 tabs at ~3pm. Denies other co-ingestions. In the ED, patient was asymptomatic, with normal vitals and ekg, baseline mental status, afebrile, no hyperreflexia or clonus.   After a period of observation, patient became tachycardic to 130 & around 10pm she had a witnessed seizure and became post-ictal. (28 Oct 2021 21:56)    Currently patient is on MV.  Sedated on Versed 0.119 mcg/kg/hr, Propofol 70.38mcg/kg/hr.  Cannot assess CAM-ICU at this time.    PAST MEDICAL & SURGICAL HISTORY:  H/O suicide attempt  Anxiety and depression  No significant past surgical history    SOCIAL HX:   Smoker           Substance abuse    FAMILY HISTORY:  No pertinent family history in first degree relatives    Review Of Systems:   All ROS are negative except per HPI     Allergies  No Known Allergies    Intolerances      PHYSICAL EXAM  ICU Vital Signs Last 24 Hrs  T(C): 35.3 (29 Oct 2021 07:01), Max: 37.4 (28 Oct 2021 20:29)  T(F): 95.6 (29 Oct 2021 07:01), Max: 99.4 (28 Oct 2021 20:29)  HR: 90 (29 Oct 2021 06:44) (90 - 157)  BP: 116/79 (29 Oct 2021 06:44) (105/71 - 160/70)  BP(mean): 93 (29 Oct 2021 06:44) (84 - 101)  RR: 12 (29 Oct 2021 07:01) (12 - 21)  SpO2: 100% (29 Oct 2021 06:44) (100% - 100%)    CONSTITUTIONAL:  Well nourished.  NAD  Unable to assess CAM-ICU at this time    ENT:   Airway patent,   Mouth with normal mucosa.   No thrush  ETT    EYES:   pupils equal,   round and reactive to light.    CARDIAC:   Normal rate,   Regular rhythm.    Heart sounds S1, S2.   No edema    RESPIRATORY:   No wheezing   Normal chest expansion  No use of accessory muscles    GASTROINTESTINAL:  Abdomen soft   Non-tender,   No guarding,   + BS    GENITOURINARY  normal genitalia for sex  no edema    MUSCULOSKELETAL:   Range of motion is not limited,  No clubbing, cyanosis    NEUROLOGICAL:   Sedated  No motor or sensory deficits    SKIN:   Skin normal color for race,   Warm and dry  No evidence of rash.    PSYCHIATRIC:   Sedated  No apparent risk to self or others.    HEME LYMPH:   No cervical  lymphadenopathy.  No inguinal lymphadenopathy            10-28-21 @ 07:01  -  10-29-21 @ 07:00  --------------------------------------------------------  IN:    Free Water: 9000 mL    Midazolam: 62.4 mL    Propofol: 174.6 mL    sodium chloride 0.9%: 1000 mL  Total IN: 12560 mL    OUT:    Indwelling Catheter - Urethral (mL): 1760 mL    Rectal Tube (mL): 3400 mL  Total OUT: 5160 mL    Total NET: 5077 mL      10-29-21 @ 07:01  -  10-29-21 @ 07:55  --------------------------------------------------------  IN:  Total IN: 0 mL    OUT:    Indwelling Catheter - Urethral (mL): 30 mL    Rectal Tube (mL): 1000 mL  Total OUT: 1030 mL    Total NET: -1030 mL    LABS:                        10.5   10.49 )-----------( 251      ( 29 Oct 2021 04:47 )             32.7     138  |  106  |  5<L>  ----------------------------<  67<L>  4.4   |  18  |  0.6<L>    Ca    8.0<L>      29 Oct 2021 04:47  Mg     2.0     10-29    TPro  5.8<L>  /  Alb  3.7  /  TBili  <0.2  /  DBili  <0.2  /  AST  17  /  ALT  9   /  AlkPhos  44  10-29     Urinalysis Basic - ( 28 Oct 2021 16:27 )  Color: Yellow / Appearance: Clear / S.020 / pH: x  Gluc: x / Ketone: Negative  / Bili: Negative / Urobili: 0.2 mg/dL   Blood: x / Protein: Negative mg/dL / Nitrite: Negative   Leuk Esterase: Negative / RBC: 3-5 /HPF / WBC x   Sq Epi: x / Non Sq Epi: Many /HPF / Bacteria: TNTC /HPF    CARDIAC MARKERS ( 29 Oct 2021 03:25 )  x     / x     / 153 U/L / x     / x        LIVER FUNCTIONS - ( 29 Oct 2021 04:47 )  Alb: 3.7 g/dL / Pro: 5.8 g/dL / ALK PHOS: 44 U/L / ALT: 9 U/L / AST: 17 U/L / GGT: x                                              Mode: Auto Mode: PRVC/ Volume Support  RR (machine): 12  TV (machine): 400  FiO2: 50  PEEP: 5  MAP: 14  PIP: 9    ABG - ( 29 Oct 2021 07:24 )  pH, Arterial: 7.40  pH, Blood: x     /  pCO2: 42    /  pO2: 153   / HCO3: 26    / Base Excess: 1.0   /  SaO2: x         X-Rays reviewed:                                                                                    ECHO    CXR interpreted by me:  ETT high, no acute cardiopulmonary pathology      MEDICATIONS  (STANDING):  chlorhexidine 0.12% Liquid 15 milliLiter(s) Oral Mucosa every 12 hours  chlorhexidine 4% Liquid 1 Application(s) Topical <User Schedule>  cyproheptadine 12 milliGRAM(s) Oral once  cyproheptadine 4 milliGRAM(s) Oral every 6 hours  famotidine Injectable 20 milliGRAM(s) IV Push every 12 hours  lactated ringers. 1000 milliLiter(s) (125 mL/Hr) IV Continuous <Continuous>  midazolam Infusion 0.02 mG/kG/Hr (1.04 mL/Hr) IV Continuous <Continuous>  polyethylene glycol/electrolyte Solution 3000 milliLiter(s) Oral every 2 hours  propofol Infusion 10 MICROgram(s)/kG/Min (2.99 mL/Hr) IV Continuous <Continuous>    MEDICATIONS  (PRN):  LORazepam   Injectable 2.5 milliGRAM(s) IV Push once PRN Seizure activity

## 2021-10-29 NOTE — CONSULT NOTE ADULT - ASSESSMENT
IMPRESSION:    Acute Respiratory Failure SP intubation  Withdrawal seizure & Toxic Metabolic Encephalopathy  Drug Overdose (Venlafaxine & Bupropion)  HO Polysubstance abuse      PLAN:    CNS:  c/w Versed & Propofol.  VEEG.   Neuro eval.  UDS & SDS.  Check Bupropion level.  Toxicology eval appreciated.    HEENT: Oral care    PULMONARY:  HOB @ 45 degrees.  Aspiration precautions.  No vent changes.  Monitor Peak & Plateau pressures.    CARDIOVASCULAR:  D5NS.  CE x 2.  EKG q12h, monitor QTc.    GI: GI prophylaxis.  NPO.  Golytely 1L per hour.    RENAL:  Trend CK & Lactate.  BMP, Mg, Phos, ionized Ca q6h.  Follow up lytes.  Correct as needed.  Rosas care.    INFECTIOUS DISEASE:  Follow up cultures    HEMATOLOGICAL:  DVT prophylaxis.    ENDOCRINE:  Follow up FS.  Insulin protocol if needed.    MUSCULOSKELETAL:  bed rest    DW mother  Monitor in MICU IMPRESSION:    Acute Respiratory Failure SP intubation  Withdrawal seizure & Toxic Metabolic Encephalopathy  Drug Overdose (Venlafaxine & Bupropion)  HO Polysubstance abuse      PLAN:    CNS:  c/w Versed & Propofol.  VEEG.   Neuro eval.  UDS & SDS.  Check Bupropion level.  Toxicology eval appreciated.    HEENT: Oral care    PULMONARY:  HOB @ 45 degrees.  Aspiration precautions.  No vent changes.  Monitor Peak & Plateau pressures.    CARDIOVASCULAR:  D5NS.  CE x 2.  EKG q12h, monitor QTc.    GI: GI prophylaxis.  NPO.  Golytely 1L per hour.    RENAL:  Trend CK & Lactate.  BMP, Mg, Phos, ionized Ca q6h.  Follow up lytes.  Correct as needed.  Bladder sono, tong if retaining.    INFECTIOUS DISEASE:  Follow up cultures    HEMATOLOGICAL:  DVT prophylaxis.    ENDOCRINE:  Follow up FS.  Insulin protocol if needed.    MUSCULOSKELETAL:  bed rest    DW mother  Monitor in MICU IMPRESSION:    Acute Respiratory Failure SP intubation  seizure & Toxic Metabolic Encephalopathy  seizure  arrythmia  Drug Overdose (Venlafaxine & Bupropion)  HO Polysubstance abuse      PLAN:    CNS:  c/w Versed & Propofol.    VEEG.   Neuro eval.    UDS & SDS.    Check Bupropion level.    Toxicology eval appreciated.    HEENT: Oral care    PULMONARY:  HOB @ 45 degrees.  Aspiration precautions.  No vent changes.  Monitor Peak & Plateau pressures.    CARDIOVASCULAR:  D5NS.  CE x 2.  EKG q12h, monitor QTc.    GI: GI prophylaxis.  NPO.    Golytely 1L per hour till stool runs clear.    RENAL:  Trend CK & Lactate.  BMP, Mg, Phos,   ionized Ca q6h.  Follow up lytes.    Correct as needed.    Bladder sono, tong if retaining.    INFECTIOUS DISEASE:  Follow up cultures    HEMATOLOGICAL:  DVT prophylaxis.    ENDOCRINE:  Follow up FS.  Insulin protocol if needed.    MUSCULOSKELETAL:  bed rest    DW mother  Monitor in MICU    The patient is critically ill with a high probability of deterioration.

## 2021-10-29 NOTE — DIETITIAN INITIAL EVALUATION ADULT. - OTHER INFO
pt is 25 year old female with hx of borderline personality disorder, anxiety, depression, substance abuse with prior suicide attempts presents with overdose of wellbutrein and effexor, s/p seizure in ED, intubated. presently sedated with propofol at 23.4ml/hr (618 kcals) , OGT with activated charcoal infusing. VEEG in progress.

## 2021-10-29 NOTE — PROGRESS NOTE ADULT - ASSESSMENT
25 year old F hx of borderline personality disorder, anxiety, depression, substance abuse, prior suicide attept by ingestion, presents to ED and  reported taking Venlafaxine 150mg x 20 tab and bupropion 100mg x 20 tabs. After a period of observation in the ED patient developed  sinus tachycardia and  had a witnessed seizure.      - Intubated  and sedated with propofol or midazolam as per toxicology  - GI decontamination with charcoal with sorbitol  followed by polyethylene glycol    - cyproheptadine   - VEEG   - I spoke with mom at bedside all questions answered    - DVT prophylaxis

## 2021-10-29 NOTE — CONSULT NOTE ADULT - ASSESSMENT
ASSESSMENT: Pt is a 25F w/ PMH Borderline personality disorder, anxiety/depression and substance abuse admitted s/p overdose w/ Wellbutrin 2g and Effexor 2g. Neurology was consulted 2/2 witnessed seizure in the ED.       PLAN: Case d/w Dr. Hoffmann   - Pt currently heavily sedated on versed and propofol   - Witnessed seizure in ED  - Connect pt to VEEG   - Ativan PRN for seizure activity   - Seizure precautions  - Concern for development of serotonin syndrome   - Keep Mg >2.0   - Neurology to follow

## 2021-10-30 LAB
ALBUMIN SERPL ELPH-MCNC: 3.4 G/DL — LOW (ref 3.5–5.2)
ALP SERPL-CCNC: 52 U/L — SIGNIFICANT CHANGE UP (ref 30–115)
ALT FLD-CCNC: 9 U/L — SIGNIFICANT CHANGE UP (ref 0–41)
AMPHET UR-MCNC: NEGATIVE — SIGNIFICANT CHANGE UP
ANION GAP SERPL CALC-SCNC: 12 MMOL/L — SIGNIFICANT CHANGE UP (ref 7–14)
APTT BLD: 28.9 SEC — SIGNIFICANT CHANGE UP (ref 27–39.2)
AST SERPL-CCNC: 21 U/L — SIGNIFICANT CHANGE UP (ref 0–41)
BARBITURATES UR SCN-MCNC: NEGATIVE — SIGNIFICANT CHANGE UP
BASOPHILS # BLD AUTO: 0.04 K/UL — SIGNIFICANT CHANGE UP (ref 0–0.2)
BASOPHILS NFR BLD AUTO: 0.4 % — SIGNIFICANT CHANGE UP (ref 0–1)
BENZODIAZ UR-MCNC: POSITIVE
BILIRUB SERPL-MCNC: <0.2 MG/DL — SIGNIFICANT CHANGE UP (ref 0.2–1.2)
BUN SERPL-MCNC: <3 MG/DL — LOW (ref 10–20)
CA-I BLD-SCNC: 0.79 MMOL/L — LOW (ref 1.15–1.33)
CA-I BLD-SCNC: 1.06 MMOL/L — LOW (ref 1.15–1.33)
CALCIUM SERPL-MCNC: 8.1 MG/DL — LOW (ref 8.5–10.1)
CHLORIDE SERPL-SCNC: 106 MMOL/L — SIGNIFICANT CHANGE UP (ref 98–110)
CK SERPL-CCNC: 159 U/L — SIGNIFICANT CHANGE UP (ref 0–225)
CO2 SERPL-SCNC: 25 MMOL/L — SIGNIFICANT CHANGE UP (ref 17–32)
COCAINE METAB.OTHER UR-MCNC: NEGATIVE — SIGNIFICANT CHANGE UP
CREAT SERPL-MCNC: 0.5 MG/DL — LOW (ref 0.7–1.5)
CULTURE RESULTS: SIGNIFICANT CHANGE UP
DRUG SCREEN 1, URINE RESULT: SIGNIFICANT CHANGE UP
EOSINOPHIL # BLD AUTO: 0.1 K/UL — SIGNIFICANT CHANGE UP (ref 0–0.7)
EOSINOPHIL NFR BLD AUTO: 1 % — SIGNIFICANT CHANGE UP (ref 0–8)
GLUCOSE BLDC GLUCOMTR-MCNC: 100 MG/DL — HIGH (ref 70–99)
GLUCOSE BLDC GLUCOMTR-MCNC: 104 MG/DL — HIGH (ref 70–99)
GLUCOSE BLDC GLUCOMTR-MCNC: 107 MG/DL — HIGH (ref 70–99)
GLUCOSE BLDC GLUCOMTR-MCNC: 107 MG/DL — HIGH (ref 70–99)
GLUCOSE BLDC GLUCOMTR-MCNC: 127 MG/DL — HIGH (ref 70–99)
GLUCOSE BLDC GLUCOMTR-MCNC: 129 MG/DL — HIGH (ref 70–99)
GLUCOSE BLDC GLUCOMTR-MCNC: 135 MG/DL — HIGH (ref 70–99)
GLUCOSE BLDC GLUCOMTR-MCNC: 70 MG/DL — SIGNIFICANT CHANGE UP (ref 70–99)
GLUCOSE SERPL-MCNC: 110 MG/DL — HIGH (ref 70–99)
HCT VFR BLD CALC: 34.1 % — LOW (ref 37–47)
HGB BLD-MCNC: 11.1 G/DL — LOW (ref 12–16)
IMM GRANULOCYTES NFR BLD AUTO: 0.4 % — HIGH (ref 0.1–0.3)
INR BLD: 1.05 RATIO — SIGNIFICANT CHANGE UP (ref 0.65–1.3)
LACTATE SERPL-SCNC: 1.3 MMOL/L — SIGNIFICANT CHANGE UP (ref 0.7–2)
LYMPHOCYTES # BLD AUTO: 1.26 K/UL — SIGNIFICANT CHANGE UP (ref 1.2–3.4)
LYMPHOCYTES # BLD AUTO: 13 % — LOW (ref 20.5–51.1)
MAGNESIUM SERPL-MCNC: 1.8 MG/DL — SIGNIFICANT CHANGE UP (ref 1.8–2.4)
MCHC RBC-ENTMCNC: 30.9 PG — SIGNIFICANT CHANGE UP (ref 27–31)
MCHC RBC-ENTMCNC: 32.6 G/DL — SIGNIFICANT CHANGE UP (ref 32–37)
MCV RBC AUTO: 95 FL — SIGNIFICANT CHANGE UP (ref 81–99)
METHADONE UR-MCNC: NEGATIVE — SIGNIFICANT CHANGE UP
MONOCYTES # BLD AUTO: 0.9 K/UL — HIGH (ref 0.1–0.6)
MONOCYTES NFR BLD AUTO: 9.3 % — SIGNIFICANT CHANGE UP (ref 1.7–9.3)
NEUTROPHILS # BLD AUTO: 7.36 K/UL — HIGH (ref 1.4–6.5)
NEUTROPHILS NFR BLD AUTO: 75.9 % — HIGH (ref 42.2–75.2)
NRBC # BLD: 0 /100 WBCS — SIGNIFICANT CHANGE UP (ref 0–0)
OPIATES UR-MCNC: NEGATIVE — SIGNIFICANT CHANGE UP
PCP UR-MCNC: NEGATIVE — SIGNIFICANT CHANGE UP
PHOSPHATE SERPL-MCNC: 3 MG/DL — SIGNIFICANT CHANGE UP (ref 2.1–4.9)
PLATELET # BLD AUTO: 172 K/UL — SIGNIFICANT CHANGE UP (ref 130–400)
POTASSIUM SERPL-MCNC: 4.4 MMOL/L — SIGNIFICANT CHANGE UP (ref 3.5–5)
POTASSIUM SERPL-SCNC: 4.4 MMOL/L — SIGNIFICANT CHANGE UP (ref 3.5–5)
PROPOXYPHENE QUALITATIVE URINE RESULT: NEGATIVE — SIGNIFICANT CHANGE UP
PROT SERPL-MCNC: 5.7 G/DL — LOW (ref 6–8)
PROTHROM AB SERPL-ACNC: 12.1 SEC — SIGNIFICANT CHANGE UP (ref 9.95–12.87)
PTH-INTACT FLD-MCNC: 52 PG/ML — SIGNIFICANT CHANGE UP (ref 15–65)
RBC # BLD: 3.59 M/UL — LOW (ref 4.2–5.4)
RBC # FLD: 12.8 % — SIGNIFICANT CHANGE UP (ref 11.5–14.5)
SODIUM SERPL-SCNC: 143 MMOL/L — SIGNIFICANT CHANGE UP (ref 135–146)
SPECIMEN SOURCE: SIGNIFICANT CHANGE UP
THC UR QL: POSITIVE
WBC # BLD: 9.7 K/UL — SIGNIFICANT CHANGE UP (ref 4.8–10.8)
WBC # FLD AUTO: 9.7 K/UL — SIGNIFICANT CHANGE UP (ref 4.8–10.8)

## 2021-10-30 PROCEDURE — 99291 CRITICAL CARE FIRST HOUR: CPT

## 2021-10-30 PROCEDURE — 99232 SBSQ HOSP IP/OBS MODERATE 35: CPT

## 2021-10-30 PROCEDURE — 74018 RADEX ABDOMEN 1 VIEW: CPT | Mod: 26

## 2021-10-30 PROCEDURE — 95720 EEG PHY/QHP EA INCR W/VEEG: CPT

## 2021-10-30 PROCEDURE — 71045 X-RAY EXAM CHEST 1 VIEW: CPT | Mod: 26

## 2021-10-30 RX ORDER — MIDAZOLAM HYDROCHLORIDE 1 MG/ML
0.02 INJECTION, SOLUTION INTRAMUSCULAR; INTRAVENOUS
Qty: 100 | Refills: 0 | Status: DISCONTINUED | OUTPATIENT
Start: 2021-10-30 | End: 2021-11-01

## 2021-10-30 RX ORDER — PROPOFOL 10 MG/ML
1 INJECTION, EMULSION INTRAVENOUS
Qty: 1000 | Refills: 0 | Status: DISCONTINUED | OUTPATIENT
Start: 2021-10-30 | End: 2021-11-02

## 2021-10-30 RX ORDER — DEXTROSE 10 % IN WATER 10 %
1000 INTRAVENOUS SOLUTION INTRAVENOUS
Refills: 0 | Status: DISCONTINUED | OUTPATIENT
Start: 2021-10-30 | End: 2021-10-30

## 2021-10-30 RX ORDER — MAGNESIUM SULFATE 500 MG/ML
2 VIAL (ML) INJECTION ONCE
Refills: 0 | Status: COMPLETED | OUTPATIENT
Start: 2021-10-30 | End: 2021-10-30

## 2021-10-30 RX ORDER — SODIUM CHLORIDE 9 MG/ML
1000 INJECTION, SOLUTION INTRAVENOUS
Refills: 0 | Status: DISCONTINUED | OUTPATIENT
Start: 2021-10-30 | End: 2021-11-01

## 2021-10-30 RX ORDER — CALCIUM GLUCONATE 100 MG/ML
2 VIAL (ML) INTRAVENOUS ONCE
Refills: 0 | Status: COMPLETED | OUTPATIENT
Start: 2021-10-30 | End: 2021-10-30

## 2021-10-30 RX ORDER — POTASSIUM CHLORIDE 20 MEQ
20 PACKET (EA) ORAL
Refills: 0 | Status: DISCONTINUED | OUTPATIENT
Start: 2021-10-30 | End: 2021-10-30

## 2021-10-30 RX ADMIN — Medication 200 GRAM(S): at 09:31

## 2021-10-30 RX ADMIN — Medication 100 MILLILITER(S): at 02:35

## 2021-10-30 RX ADMIN — Medication 100 MILLILITER(S): at 07:31

## 2021-10-30 RX ADMIN — CYPROHEPTADINE HYDROCHLORIDE 4 MILLIGRAM(S): 4 TABLET ORAL at 06:01

## 2021-10-30 RX ADMIN — CYPROHEPTADINE HYDROCHLORIDE 4 MILLIGRAM(S): 4 TABLET ORAL at 00:39

## 2021-10-30 RX ADMIN — CHLORHEXIDINE GLUCONATE 1 APPLICATION(S): 213 SOLUTION TOPICAL at 06:02

## 2021-10-30 RX ADMIN — SODIUM CHLORIDE 50 MILLILITER(S): 9 INJECTION, SOLUTION INTRAVENOUS at 20:04

## 2021-10-30 RX ADMIN — CYPROHEPTADINE HYDROCHLORIDE 4 MILLIGRAM(S): 4 TABLET ORAL at 23:43

## 2021-10-30 RX ADMIN — CHLORHEXIDINE GLUCONATE 15 MILLILITER(S): 213 SOLUTION TOPICAL at 06:01

## 2021-10-30 RX ADMIN — MIDAZOLAM HYDROCHLORIDE 1.04 MG/KG/HR: 1 INJECTION, SOLUTION INTRAMUSCULAR; INTRAVENOUS at 23:45

## 2021-10-30 RX ADMIN — Medication 100 MILLILITER(S): at 10:04

## 2021-10-30 RX ADMIN — CHLORHEXIDINE GLUCONATE 15 MILLILITER(S): 213 SOLUTION TOPICAL at 17:31

## 2021-10-30 RX ADMIN — PROPOFOL 0.31 MICROGRAM(S)/KG/MIN: 10 INJECTION, EMULSION INTRAVENOUS at 12:24

## 2021-10-30 RX ADMIN — CYPROHEPTADINE HYDROCHLORIDE 4 MILLIGRAM(S): 4 TABLET ORAL at 17:31

## 2021-10-30 RX ADMIN — SODIUM CHLORIDE 100 MILLILITER(S): 9 INJECTION, SOLUTION INTRAVENOUS at 02:36

## 2021-10-30 RX ADMIN — PROPOFOL 0.31 MICROGRAM(S)/KG/MIN: 10 INJECTION, EMULSION INTRAVENOUS at 08:08

## 2021-10-30 RX ADMIN — PROPOFOL 2.99 MICROGRAM(S)/KG/MIN: 10 INJECTION, EMULSION INTRAVENOUS at 00:02

## 2021-10-30 RX ADMIN — SODIUM CHLORIDE 100 MILLILITER(S): 9 INJECTION, SOLUTION INTRAVENOUS at 16:40

## 2021-10-30 RX ADMIN — FAMOTIDINE 20 MILLIGRAM(S): 10 INJECTION INTRAVENOUS at 06:01

## 2021-10-30 RX ADMIN — MIDAZOLAM HYDROCHLORIDE 1.04 MG/KG/HR: 1 INJECTION, SOLUTION INTRAMUSCULAR; INTRAVENOUS at 07:33

## 2021-10-30 RX ADMIN — MIDAZOLAM HYDROCHLORIDE 1.04 MG/KG/HR: 1 INJECTION, SOLUTION INTRAMUSCULAR; INTRAVENOUS at 10:25

## 2021-10-30 RX ADMIN — PROPOFOL 0.31 MICROGRAM(S)/KG/MIN: 10 INJECTION, EMULSION INTRAVENOUS at 23:45

## 2021-10-30 RX ADMIN — ENOXAPARIN SODIUM 40 MILLIGRAM(S): 100 INJECTION SUBCUTANEOUS at 11:38

## 2021-10-30 RX ADMIN — PROPOFOL 0.31 MICROGRAM(S)/KG/MIN: 10 INJECTION, EMULSION INTRAVENOUS at 07:32

## 2021-10-30 RX ADMIN — CYPROHEPTADINE HYDROCHLORIDE 4 MILLIGRAM(S): 4 TABLET ORAL at 11:38

## 2021-10-30 RX ADMIN — SODIUM CHLORIDE 100 MILLILITER(S): 9 INJECTION, SOLUTION INTRAVENOUS at 07:31

## 2021-10-30 RX ADMIN — Medication 50 GRAM(S): at 09:21

## 2021-10-30 RX ADMIN — FAMOTIDINE 20 MILLIGRAM(S): 10 INJECTION INTRAVENOUS at 17:54

## 2021-10-30 RX ADMIN — PROPOFOL 0.31 MICROGRAM(S)/KG/MIN: 10 INJECTION, EMULSION INTRAVENOUS at 16:40

## 2021-10-30 RX ADMIN — PROPOFOL 0.31 MICROGRAM(S)/KG/MIN: 10 INJECTION, EMULSION INTRAVENOUS at 20:59

## 2021-10-30 NOTE — PROGRESS NOTE ADULT - SUBJECTIVE AND OBJECTIVE BOX
Patient is sedated on ventilator         PHYSICAL EXAM:  GENERAL: NAD  HEAD:  Atraumatic, Normocephalic  NERVOUS SYSTEM:  no focal deficits   CHEST/LUNG: Clear to percussion bilaterally; No rales, rhonchi, wheezing, or rubs  HEART: Regular rate and rhythm; No murmurs, rubs, or gallops  ABDOMEN: Soft, Nontender, Nondistended; Bowel sounds present  EXTREMITIES:  2+ Peripheral Pulses, No clubbing, cyanosis, or edema                                      11.1   9.70  )-----------( 172      ( 30 Oct 2021 05:36 )             34.1       10-29    140  |  107  |  <3<L>  ----------------------------<  80  4.8   |  24  |  <0.5<L>    Ca    7.9<L>      29 Oct 2021 21:33  Phos  2.2     10-29  Mg     1.8     10-29    TPro  5.6<L>  /  Alb  3.6  /  TBili  <0.2  /  DBili  x   /  AST  <5  /  ALT  10  /  AlkPhos  50  10-29          ABG - ( 30 Oct 2021 04:35 )  pH, Arterial: 7.46  pH, Blood: x     /  pCO2: 41    /  pO2: 157   / HCO3: 29    / Base Excess: 4.9   /  SaO2: 100.0               Urinalysis Basic - ( 28 Oct 2021 16:27 )    Color: Yellow / Appearance: Clear / S.020 / pH: x  Gluc: x / Ketone: Negative  / Bili: Negative / Urobili: 0.2 mg/dL   Blood: x / Protein: Negative mg/dL / Nitrite: Negative   Leuk Esterase: Negative / RBC: 3-5 /HPF / WBC x   Sq Epi: x / Non Sq Epi: Many /HPF / Bacteria: TNTC /HPF        PT/INR - ( 30 Oct 2021 05:36 )   PT: 12.10 sec;   INR: 1.05 ratio         PTT - ( 30 Oct 2021 05:36 )  PTT:28.9 sec    Lactate Trend  10-30 @ 05:36 Lactate:1.3   10-29 @ 21:33 Lactate:1.3   10-29 @ 15:00 Lactate:1.2   10-29 @ 12:44 Lactate:1.8       CARDIAC MARKERS ( 29 Oct 2021 21:33 )  x     / x     / 159 U/L / x     / x      CARDIAC MARKERS ( 29 Oct 2021 12:44 )  x     / x     / 220 U/L / x     / x      CARDIAC MARKERS ( 29 Oct 2021 03:25 )  x     / x     / 153 U/L / x     / x            CAPILLARY BLOOD GLUCOSE      POCT Blood Glucose.: 107 mg/dL (30 Oct 2021 06:46)                  < from: 12 Lead ECG (10.29.21 @ 09:36) >  Ventricular Rate 89 BPM    Atrial Rate 89 BPM    P-R Interval 150 ms    QRS Duration 88 ms    Q-T Interval 402 ms    QTC Calculation(Bazett) 489 ms    P Axis 76 degrees    R Axis 82 degrees    T Axis 54 degrees    Diagnosis Line Normal sinus rhythm    < end of copied text >    Mode: Auto Mode: PRVC/ Volume Support  RR (machine): 12  TV (machine): 400  FiO2: 40  PEEP: 5    CARDIAC ENZYMES  Creatine Kinase, Serum: 153 (10-29 @ 03:25)      RADIOLOGY  < from: Xray Chest 1 View-PORTABLE IMMEDIATE (Xray Chest 1 View-PORTABLE IMMEDIATE .) (10.29.21 @ 00:38) >  Impression:    No radiographic evidence of acute cardiopulmonary disease.    < end of copied text >    MEDICATIONS  (STANDING):  chlorhexidine 0.12% Liquid 15 milliLiter(s) Oral Mucosa every 12 hours  chlorhexidine 4% Liquid 1 Application(s) Topical <User Schedule>  cyproheptadine 4 milliGRAM(s) Oral every 6 hours  dextrose 10%. 1000 milliLiter(s) (100 mL/Hr) IV Continuous <Continuous>  enoxaparin Injectable 40 milliGRAM(s) SubCutaneous daily  famotidine Injectable 20 milliGRAM(s) IV Push every 12 hours  lactated ringers. 1000 milliLiter(s) (100 mL/Hr) IV Continuous <Continuous>  midazolam Infusion 0.02 mG/kG/Hr (1.04 mL/Hr) IV Continuous <Continuous>  propofol Infusion 1 MICROgram(s)/kG/Min (0.31 mL/Hr) IV Continuous <Continuous>    MEDICATIONS  (PRN):  LORazepam   Injectable 2.5 milliGRAM(s) IV Push once PRN Seizure activity        Patient is sedated on ventilator         PHYSICAL EXAM:  GENERAL: sedated on ventilator   HEAD:  Atraumatic, Normocephalic  NERVOUS SYSTEM:  sedated   CHEST/LUNG: Clear to percussion bilaterally; No rales, rhonchi, wheezing, or rubs  HEART: Regular rate and rhythm; No murmurs, rubs, or gallops  ABDOMEN: Soft, Nontender, Nondistended; Bowel sounds present  EXTREMITIES:  2+ Peripheral Pulses, No clubbing, cyanosis, or edema                                      11.1   9.70  )-----------( 172      ( 30 Oct 2021 05:36 )             34.1       10-29    140  |  107  |  <3<L>  ----------------------------<  80  4.8   |  24  |  <0.5<L>    Ca    7.9<L>      29 Oct 2021 21:33  Phos  2.2     10-29  Mg     1.8     10-29    TPro  5.6<L>  /  Alb  3.6  /  TBili  <0.2  /  DBili  x   /  AST  <5  /  ALT  10  /  AlkPhos  50  10-29          ABG - ( 30 Oct 2021 04:35 )  pH, Arterial: 7.46  pH, Blood: x     /  pCO2: 41    /  pO2: 157   / HCO3: 29    / Base Excess: 4.9   /  SaO2: 100.0               Urinalysis Basic - ( 28 Oct 2021 16:27 )    Color: Yellow / Appearance: Clear / S.020 / pH: x  Gluc: x / Ketone: Negative  / Bili: Negative / Urobili: 0.2 mg/dL   Blood: x / Protein: Negative mg/dL / Nitrite: Negative   Leuk Esterase: Negative / RBC: 3-5 /HPF / WBC x   Sq Epi: x / Non Sq Epi: Many /HPF / Bacteria: TNTC /HPF        PT/INR - ( 30 Oct 2021 05:36 )   PT: 12.10 sec;   INR: 1.05 ratio         PTT - ( 30 Oct 2021 05:36 )  PTT:28.9 sec    Lactate Trend  10-30 @ 05:36 Lactate:1.3   10-29 @ 21:33 Lactate:1.3   10-29 @ 15:00 Lactate:1.2   10-29 @ 12:44 Lactate:1.8       CARDIAC MARKERS ( 29 Oct 2021 21:33 )  x     / x     / 159 U/L / x     / x      CARDIAC MARKERS ( 29 Oct 2021 12:44 )  x     / x     / 220 U/L / x     / x      CARDIAC MARKERS ( 29 Oct 2021 03:25 )  x     / x     / 153 U/L / x     / x            CAPILLARY BLOOD GLUCOSE      POCT Blood Glucose.: 107 mg/dL (30 Oct 2021 06:46)                  < from: 12 Lead ECG (10.29.21 @ 09:36) >  Ventricular Rate 89 BPM    Atrial Rate 89 BPM    P-R Interval 150 ms    QRS Duration 88 ms    Q-T Interval 402 ms    QTC Calculation(Bazett) 489 ms    P Axis 76 degrees    R Axis 82 degrees    T Axis 54 degrees    Diagnosis Line Normal sinus rhythm    < end of copied text >    Mode: Auto Mode: PRVC/ Volume Support  RR (machine): 12  TV (machine): 400  FiO2: 40  PEEP: 5    CARDIAC ENZYMES  Creatine Kinase, Serum: 153 (10-29 @ 03:25)      RADIOLOGY  < from: Xray Chest 1 View-PORTABLE IMMEDIATE (Xray Chest 1 View-PORTABLE IMMEDIATE .) (10.29.21 @ 00:38) >  Impression:    No radiographic evidence of acute cardiopulmonary disease.    < end of copied text >    MEDICATIONS  (STANDING):  chlorhexidine 0.12% Liquid 15 milliLiter(s) Oral Mucosa every 12 hours  chlorhexidine 4% Liquid 1 Application(s) Topical <User Schedule>  cyproheptadine 4 milliGRAM(s) Oral every 6 hours  dextrose 10%. 1000 milliLiter(s) (100 mL/Hr) IV Continuous <Continuous>  enoxaparin Injectable 40 milliGRAM(s) SubCutaneous daily  famotidine Injectable 20 milliGRAM(s) IV Push every 12 hours  lactated ringers. 1000 milliLiter(s) (100 mL/Hr) IV Continuous <Continuous>  midazolam Infusion 0.02 mG/kG/Hr (1.04 mL/Hr) IV Continuous <Continuous>  propofol Infusion 1 MICROgram(s)/kG/Min (0.31 mL/Hr) IV Continuous <Continuous>    MEDICATIONS  (PRN):  LORazepam   Injectable 2.5 milliGRAM(s) IV Push once PRN Seizure activity

## 2021-10-30 NOTE — PROGRESS NOTE ADULT - SUBJECTIVE AND OBJECTIVE BOX
LENGTH OF HOSPITAL STAY: 2d    CHIEF COMPLAINT:   Patient is a 25y old  Female who presents with a chief complaint of Drug overdose (30 Oct 2021 13:01)      HISTORY OF PRESENTING ILLNESS:    HPI:  25 year old F hx of borderline personality disorder, anxiety, depression, substance abuse, prior SI by ingestion, presents to ED with ingestions. Patient reported taking Venlafaxine 150mg x 20 tab and bupropion 100mg x 20 tabs at ~3pm.   (28 Oct 2021 21:56)    PAST MEDICAL & SURGICAL HISTORY  PAST MEDICAL & SURGICAL HISTORY:  H/O suicide attempt    Anxiety and depression    No significant past surgical history      SOCIAL HISTORY:    ALLERGIES:  No Known Allergies    MEDICATIONS:  STANDING MEDICATIONS  chlorhexidine 0.12% Liquid 15 milliLiter(s) Oral Mucosa every 12 hours  chlorhexidine 4% Liquid 1 Application(s) Topical <User Schedule>  cyproheptadine 4 milliGRAM(s) Oral every 6 hours  dextrose 10%. 1000 milliLiter(s) IV Continuous <Continuous>  enoxaparin Injectable 40 milliGRAM(s) SubCutaneous daily  famotidine Injectable 20 milliGRAM(s) IV Push every 12 hours  lactated ringers. 1000 milliLiter(s) IV Continuous <Continuous>  midazolam Infusion 0.02 mG/kG/Hr IV Continuous <Continuous>  propofol Infusion 1 MICROgram(s)/kG/Min IV Continuous <Continuous>    PRN MEDICATIONS  LORazepam   Injectable 2.5 milliGRAM(s) IV Push once PRN    VITALS:   T(F): 97.1  HR: 89  BP: 132/94  RR: 13  SpO2: 100%    LABS:                        11.1   9.70  )-----------( 172      ( 30 Oct 2021 05:36 )             34.1     10-30    143  |  106  |  <3<L>  ----------------------------<  110<H>  4.4   |  25  |  0.5<L>    Ca    8.1<L>      30 Oct 2021 05:36  Phos  3.0     10-30  Mg     1.8     10-30    TPro  5.7<L>  /  Alb  3.4<L>  /  TBili  <0.2  /  DBili  x   /  AST  21  /  ALT  9   /  AlkPhos  52  10-30    PT/INR - ( 30 Oct 2021 05:36 )   PT: 12.10 sec;   INR: 1.05 ratio         PTT - ( 30 Oct 2021 05:36 )  PTT:28.9 sec  Urinalysis Basic - ( 28 Oct 2021 16:27 )    Color: Yellow / Appearance: Clear / S.020 / pH: x  Gluc: x / Ketone: Negative  / Bili: Negative / Urobili: 0.2 mg/dL   Blood: x / Protein: Negative mg/dL / Nitrite: Negative   Leuk Esterase: Negative / RBC: 3-5 /HPF / WBC x   Sq Epi: x / Non Sq Epi: Many /HPF / Bacteria: TNTC /HPF      ABG - ( 30 Oct 2021 04:35 )  pH, Arterial: 7.46  pH, Blood: x     /  pCO2: 41    /  pO2: 157   / HCO3: 29    / Base Excess: 4.9   /  SaO2: 100.0             Creatine Kinase, Serum: 159 U/L (10-30-21 @ 05:36)  Lactate, Blood: 1.3 mmol/L (10-30-21 @ 05:36)  Creatine Kinase, Serum: 159 U/L (10-29-21 @ 21:33)  Lactate, Blood: 1.3 mmol/L (10-29-21 @ 21:33)  Lactate, Blood: 1.2 mmol/L (10-29-21 @ 15:00)      Culture - Urine (collected 28 Oct 2021 16:27)  Source: Clean Catch Clean Catch (Midstream)  Final Report (30 Oct 2021 07:16):    <10,000 CFU/mL Normal Urogenital Zunilda      CARDIAC MARKERS ( 30 Oct 2021 05:36 )  x     / x     / 159 U/L / x     / x      CARDIAC MARKERS ( 29 Oct 2021 21:33 )  x     / x     / 159 U/L / x     / x      CARDIAC MARKERS ( 29 Oct 2021 12:44 )  x     / x     / 220 U/L / x     / x      CARDIAC MARKERS ( 29 Oct 2021 03:25 )  x     / x     / 153 U/L / x     / x              PHYSICAL EXAM:  GEN: sedated  HEENT: ett in place  LUNGS: Clear to auscultation bilaterally   HEART: S1/S2 present. RRR.   ABD: Soft, non-tender, non-distended. Bowel sounds present  EXT:-c/c/e  NEURO: sedated

## 2021-10-30 NOTE — PROGRESS NOTE ADULT - ASSESSMENT
Impression/plan  Overdose  acute hypoxemic respiratory failure  seizure  Serotonin syndrome  Suicidal Iseation    Pt remains intubated and sedated.  As per Neuro, will taper sedation and monitor w VEEG.  replace magnesium and calcium. Will check SBT but without plan to extubate until serotonin syndrome is ruled out/resolved. Pt is critically ill with multiple organ involvement.

## 2021-10-30 NOTE — PROGRESS NOTE ADULT - SUBJECTIVE AND OBJECTIVE BOX
Neurology Follow up note    Name  PREETHI NASH    HPI:  25 year old F hx of borderline personality disorder, anxiety, depression, substance abuse, prior SI by ingestion, presents to ED with ingestions. Patient reported taking Venlafaxine 150mg x 20 tab and bupropion 100mg x 20 tabs at ~3pm.   (28 Oct 2021 21:56)      Interval History - Patient seen and examined and mother at bedside.  No events overnight and discussed with mother too early to give a prognosis as she is still heavily sedated.          Vital Signs Last 24 Hrs  T(C): 36.2 (30 Oct 2021 11:00), Max: 36.2 (30 Oct 2021 11:00)  T(F): 97.1 (30 Oct 2021 11:00), Max: 97.1 (30 Oct 2021 11:00)  HR: 89 (30 Oct 2021 11:01) (65 - 92)  BP: 132/86 (30 Oct 2021 11:01) (103/70 - 133/91)  BP(mean): 102 (30 Oct 2021 11:01) (82 - 108)  RR: 12 (30 Oct 2021 11:01) (11 - 14)  SpO2: 100% (30 Oct 2021 11:01) (100% - 100%)  ICU Vital Signs Last 24 Hrs  T(C): 36.2 (30 Oct 2021 11:00), Max: 36.2 (30 Oct 2021 11:00)  T(F): 97.1 (30 Oct 2021 11:00), Max: 97.1 (30 Oct 2021 11:00)  HR: 89 (30 Oct 2021 11:01) (65 - 92)  BP: 132/86 (30 Oct 2021 11:01) (103/70 - 133/91)  BP(mean): 102 (30 Oct 2021 11:01) (82 - 108)  ABP: --  ABP(mean): --  RR: 12 (30 Oct 2021 11:01) (11 - 14)  SpO2: 100% (30 Oct 2021 11:01) (100% - 100%)          Neurological Exam:   Intubated sedated, spontaneous gag on vent  Pupils pin point  Minimal withdrawal to noxious stimuli in UE not in LE      Medications  chlorhexidine 0.12% Liquid 15 milliLiter(s) Oral Mucosa every 12 hours  chlorhexidine 4% Liquid 1 Application(s) Topical <User Schedule>  cyproheptadine 4 milliGRAM(s) Oral every 6 hours  dextrose 10%. 1000 milliLiter(s) IV Continuous <Continuous>  enoxaparin Injectable 40 milliGRAM(s) SubCutaneous daily  famotidine Injectable 20 milliGRAM(s) IV Push every 12 hours  lactated ringers. 1000 milliLiter(s) IV Continuous <Continuous>  LORazepam   Injectable 2.5 milliGRAM(s) IV Push once PRN  midazolam Infusion 0.02 mG/kG/Hr IV Continuous <Continuous>  propofol Infusion 1 MICROgram(s)/kG/Min IV Continuous <Continuous>      Lab                        11.1   9.70  )-----------( 172      ( 30 Oct 2021 05:36 )             34.1     10-30    143  |  106  |  <3<L>  ----------------------------<  110<H>  4.4   |  25  |  0.5<L>    Ca    8.1<L>      30 Oct 2021 05:36  Phos  3.0     10-30  Mg     1.8     10-30    TPro  5.7<L>  /  Alb  3.4<L>  /  TBili  <0.2  /  DBili  x   /  AST  21  /  ALT  9   /  AlkPhos  52  10-30    CAPILLARY BLOOD GLUCOSE      POCT Blood Glucose.: 129 mg/dL (30 Oct 2021 09:41)  POCT Blood Glucose.: 107 mg/dL (30 Oct 2021 06:46)  POCT Blood Glucose.: 107 mg/dL (30 Oct 2021 05:12)  POCT Blood Glucose.: 135 mg/dL (30 Oct 2021 02:06)  POCT Blood Glucose.: 127 mg/dL (30 Oct 2021 00:07)  POCT Blood Glucose.: 209 mg/dL (29 Oct 2021 22:49)  POCT Blood Glucose.: 48 mg/dL (29 Oct 2021 22:16)  POCT Blood Glucose.: 75 mg/dL (29 Oct 2021 18:17)    LIVER FUNCTIONS - ( 30 Oct 2021 05:36 )  Alb: 3.4 g/dL / Pro: 5.7 g/dL / ALK PHOS: 52 U/L / ALT: 9 U/L / AST: 21 U/L / GGT: x           PT/INR - ( 30 Oct 2021 05:36 )   PT: 12.10 sec;   INR: 1.05 ratio         PTT - ( 30 Oct 2021 05:36 )  PTT:28.9 sec    Radiology      Other studies:     Assessment- This is a 25y year old Female presenting with drug overdose including wellbutryn overdose.  VEEG running failed to show any epileptiform abnormalities or seizures and only burst suppression pattern seen in setting of sedation.    Plan  1. Lower sedation   2. Will continue VEEG as sedation is lowered  3. No AEDs for now

## 2021-10-30 NOTE — PROGRESS NOTE ADULT - ASSESSMENT
25 year old F hx of borderline personality disorder, anxiety, depression, substance abuse, prior suicide attept by ingestion, presents to ED and  reported taking Venlafaxine 150mg x 20 tab and bupropion 100mg x 20 tabs. After a period of observation in the ED patient developed  sinus tachycardia and  had a witnessed seizure.      - Intubated  and sedated with propofol or midazolam as per toxicology  - GI decontamination with charcoal with sorbitol  followed by polyethylene glycol    - cyproheptadine   - VEEG   - DVT prophylaxis       25 year old F hx of borderline personality disorder, anxiety, depression, substance abuse, prior suicide attept by ingestion, presents to ED and  reported taking Venlafaxine 150mg x 20 tab and bupropion 100mg x 20 tabs. After a period of observation in the ED patient developed  sinus tachycardia and  had a witnessed seizure.      - Intubated  and sedated with propofol/  midazolam as per toxicology  - GI decontamination with charcoal with sorbitol  followed by polyethylene glycol    - VEEG- no seizures    - DVT prophylaxis

## 2021-10-30 NOTE — EEG REPORT - NS EEG TEXT BOX
VIDEO EEG FINAL REPORT      PREETHI NASH    25y Female  MRN [id number]      Recording Technique: The patient underwent continuous video-EEG monitoring using Telemetry System hardware on the XL [employer] Digital System. EEG and video data were stored on a computer hard drive with important events saved in digital archive files. The material was reviewed by a physician (electroencephalographer/epileptologist) on a daily basis. Rolo and seizure detection algorithms were utilized if needed. An EEG technician attended to the patient for 8 to 10 hours per day. The patient was observed by the epilepsy nursing staff 24 hrs per day. The epilepsy center neurologist was available in person or on call 24 hours per day.    Placement and Labeling of Eelectrodes: The EEG was performed using at least 20 channel referential EEG connections (coronal over temporal over parasaggital montage) with inferior temporal electrodes when indicting and using all standard 10-20 electrode placement with EKG, with additional electrodes placed in the inferior temporal region using the modified 10-10 montage electrode placements for elective admissions, or if deemed necessary. Recording was at a sampling rate of 256 samples per second per channel. Time syncronized digital video recording was done simultaneously with EEG recording. A low light infrared camera was used for low light recording.      HPI:  25 year old F hx of borderline personality disorder, anxiety, depression, substance abuse, prior SI by ingestion, presents to ED with ingestions. Patient reported taking Venlafaxine 150mg x 20 tab and bupropion 100mg x 20 tabs at ~3pm.   (28 Oct 2021 21:56)      MEDICATIONS  (STANDING):  chlorhexidine 0.12% Liquid 15 milliLiter(s) Oral Mucosa every 12 hours  chlorhexidine 4% Liquid 1 Application(s) Topical <User Schedule>  cyproheptadine 4 milliGRAM(s) Oral every 6 hours  enoxaparin Injectable 40 milliGRAM(s) SubCutaneous daily  famotidine Injectable 20 milliGRAM(s) IV Push every 12 hours  lactated ringers. 1000 milliLiter(s) (100 mL/Hr) IV Continuous <Continuous>  midazolam Infusion 0.02 mG/kG/Hr (1.04 mL/Hr) IV Continuous <Continuous>  propofol Infusion 1 MICROgram(s)/kG/Min (0.31 mL/Hr) IV Continuous <Continuous>    MEDICATIONS  (PRN):  LORazepam   Injectable 2.5 milliGRAM(s) IV Push once PRN Seizure activity        AWAKE  Awake recording is not present       ASLEEP  No clear sleep transients other than diffuse sleep spindles.     Background: Diffuse delta frequencies with intermixed fast activity and spindle patterns with subsequent development of burst suppression pattern with bursts of activity from 3-5 seconds, and then periods of attenuation of 4-5 seconds.     GENERALIZED SLOWING  Severe, as above  FOCAL SLOWING    None  BREACH ARTIFACT  None    ACTIVATION PROCEDURES  Hyperventilation:  Photic Stimulation:      SLEEP DEPRIVATION/MEDICATION REDUCTION      EPILEPTIFORM ACTIVITY      None      IMPRESSION    Abnormal VEE. Diffuse slow background  2. Burst suppression pattern   3. No epileptiform activity.   CLINICAL CORRELATION    The findings are consistent with diffuse cerebral dysfunction and sedation-induced burst suppression pattern.

## 2021-10-31 LAB
ALBUMIN SERPL ELPH-MCNC: 3.3 G/DL — LOW (ref 3.5–5.2)
ALP SERPL-CCNC: 69 U/L — SIGNIFICANT CHANGE UP (ref 30–115)
ALT FLD-CCNC: 8 U/L — SIGNIFICANT CHANGE UP (ref 0–41)
ANION GAP SERPL CALC-SCNC: 10 MMOL/L — SIGNIFICANT CHANGE UP (ref 7–14)
AST SERPL-CCNC: 14 U/L — SIGNIFICANT CHANGE UP (ref 0–41)
BASE EXCESS BLDA CALC-SCNC: 7.1 MMOL/L — HIGH (ref -2–3)
BASOPHILS # BLD AUTO: 0.04 K/UL — SIGNIFICANT CHANGE UP (ref 0–0.2)
BASOPHILS NFR BLD AUTO: 0.3 % — SIGNIFICANT CHANGE UP (ref 0–1)
BILIRUB SERPL-MCNC: <0.2 MG/DL — SIGNIFICANT CHANGE UP (ref 0.2–1.2)
BUN SERPL-MCNC: <3 MG/DL — LOW (ref 10–20)
CA-I BLD-SCNC: 1.09 MMOL/L — LOW (ref 1.15–1.33)
CA-I BLD-SCNC: 1.13 MMOL/L — LOW (ref 1.15–1.33)
CALCIUM SERPL-MCNC: 8.2 MG/DL — LOW (ref 8.4–10.5)
CALCIUM SERPL-MCNC: 8.5 MG/DL — SIGNIFICANT CHANGE UP (ref 8.5–10.1)
CHLORIDE SERPL-SCNC: 102 MMOL/L — SIGNIFICANT CHANGE UP (ref 98–110)
CO2 SERPL-SCNC: 29 MMOL/L — SIGNIFICANT CHANGE UP (ref 17–32)
CREAT SERPL-MCNC: <0.5 MG/DL — LOW (ref 0.7–1.5)
EOSINOPHIL # BLD AUTO: 0.22 K/UL — SIGNIFICANT CHANGE UP (ref 0–0.7)
EOSINOPHIL NFR BLD AUTO: 1.7 % — SIGNIFICANT CHANGE UP (ref 0–8)
GLUCOSE BLDC GLUCOMTR-MCNC: 113 MG/DL — HIGH (ref 70–99)
GLUCOSE BLDC GLUCOMTR-MCNC: 114 MG/DL — HIGH (ref 70–99)
GLUCOSE BLDC GLUCOMTR-MCNC: 119 MG/DL — HIGH (ref 70–99)
GLUCOSE BLDC GLUCOMTR-MCNC: 147 MG/DL — HIGH (ref 70–99)
GLUCOSE BLDC GLUCOMTR-MCNC: 83 MG/DL — SIGNIFICANT CHANGE UP (ref 70–99)
GLUCOSE BLDC GLUCOMTR-MCNC: 97 MG/DL — SIGNIFICANT CHANGE UP (ref 70–99)
GLUCOSE SERPL-MCNC: 91 MG/DL — SIGNIFICANT CHANGE UP (ref 70–99)
HCO3 BLDA-SCNC: 32 MMOL/L — HIGH (ref 21–28)
HCT VFR BLD CALC: 34.9 % — LOW (ref 37–47)
HGB BLD-MCNC: 11.3 G/DL — LOW (ref 12–16)
HOROWITZ INDEX BLDA+IHG-RTO: 30 — SIGNIFICANT CHANGE UP
IMM GRANULOCYTES NFR BLD AUTO: 0.2 % — SIGNIFICANT CHANGE UP (ref 0.1–0.3)
LYMPHOCYTES # BLD AUTO: 1.41 K/UL — SIGNIFICANT CHANGE UP (ref 1.2–3.4)
LYMPHOCYTES # BLD AUTO: 11.1 % — LOW (ref 20.5–51.1)
MAGNESIUM SERPL-MCNC: 1.8 MG/DL — SIGNIFICANT CHANGE UP (ref 1.8–2.4)
MCHC RBC-ENTMCNC: 30.5 PG — SIGNIFICANT CHANGE UP (ref 27–31)
MCHC RBC-ENTMCNC: 32.4 G/DL — SIGNIFICANT CHANGE UP (ref 32–37)
MCV RBC AUTO: 94.1 FL — SIGNIFICANT CHANGE UP (ref 81–99)
MONOCYTES # BLD AUTO: 1.19 K/UL — HIGH (ref 0.1–0.6)
MONOCYTES NFR BLD AUTO: 9.3 % — SIGNIFICANT CHANGE UP (ref 1.7–9.3)
NEUTROPHILS # BLD AUTO: 9.84 K/UL — HIGH (ref 1.4–6.5)
NEUTROPHILS NFR BLD AUTO: 77.4 % — HIGH (ref 42.2–75.2)
NRBC # BLD: 0 /100 WBCS — SIGNIFICANT CHANGE UP (ref 0–0)
PCO2 BLDA: 45 MMHG — HIGH (ref 32–35)
PH BLDA: 7.46 — HIGH (ref 7.35–7.45)
PLATELET # BLD AUTO: 177 K/UL — SIGNIFICANT CHANGE UP (ref 130–400)
PO2 BLDA: 128 MMHG — HIGH (ref 83–108)
POTASSIUM SERPL-MCNC: 3.3 MMOL/L — LOW (ref 3.5–5)
POTASSIUM SERPL-SCNC: 3.3 MMOL/L — LOW (ref 3.5–5)
PROT SERPL-MCNC: 5.5 G/DL — LOW (ref 6–8)
RBC # BLD: 3.71 M/UL — LOW (ref 4.2–5.4)
RBC # FLD: 12.9 % — SIGNIFICANT CHANGE UP (ref 11.5–14.5)
SAO2 % BLDA: 99.6 % — HIGH (ref 94–98)
SODIUM SERPL-SCNC: 141 MMOL/L — SIGNIFICANT CHANGE UP (ref 135–146)
WBC # BLD: 12.73 K/UL — HIGH (ref 4.8–10.8)
WBC # FLD AUTO: 12.73 K/UL — HIGH (ref 4.8–10.8)

## 2021-10-31 PROCEDURE — 71045 X-RAY EXAM CHEST 1 VIEW: CPT | Mod: 26

## 2021-10-31 PROCEDURE — 99233 SBSQ HOSP IP/OBS HIGH 50: CPT

## 2021-10-31 PROCEDURE — 99291 CRITICAL CARE FIRST HOUR: CPT

## 2021-10-31 RX ORDER — POTASSIUM CHLORIDE 20 MEQ
20 PACKET (EA) ORAL ONCE
Refills: 0 | Status: COMPLETED | OUTPATIENT
Start: 2021-10-31 | End: 2021-10-31

## 2021-10-31 RX ADMIN — CHLORHEXIDINE GLUCONATE 15 MILLILITER(S): 213 SOLUTION TOPICAL at 17:33

## 2021-10-31 RX ADMIN — SODIUM CHLORIDE 100 MILLILITER(S): 9 INJECTION, SOLUTION INTRAVENOUS at 03:05

## 2021-10-31 RX ADMIN — PROPOFOL 0.31 MICROGRAM(S)/KG/MIN: 10 INJECTION, EMULSION INTRAVENOUS at 07:29

## 2021-10-31 RX ADMIN — MIDAZOLAM HYDROCHLORIDE 1.04 MG/KG/HR: 1 INJECTION, SOLUTION INTRAMUSCULAR; INTRAVENOUS at 17:00

## 2021-10-31 RX ADMIN — MIDAZOLAM HYDROCHLORIDE 1.04 MG/KG/HR: 1 INJECTION, SOLUTION INTRAMUSCULAR; INTRAVENOUS at 07:30

## 2021-10-31 RX ADMIN — PROPOFOL 0.31 MICROGRAM(S)/KG/MIN: 10 INJECTION, EMULSION INTRAVENOUS at 18:58

## 2021-10-31 RX ADMIN — ENOXAPARIN SODIUM 40 MILLIGRAM(S): 100 INJECTION SUBCUTANEOUS at 11:23

## 2021-10-31 RX ADMIN — PROPOFOL 0.31 MICROGRAM(S)/KG/MIN: 10 INJECTION, EMULSION INTRAVENOUS at 19:34

## 2021-10-31 RX ADMIN — FAMOTIDINE 20 MILLIGRAM(S): 10 INJECTION INTRAVENOUS at 17:32

## 2021-10-31 RX ADMIN — CYPROHEPTADINE HYDROCHLORIDE 4 MILLIGRAM(S): 4 TABLET ORAL at 11:23

## 2021-10-31 RX ADMIN — CHLORHEXIDINE GLUCONATE 1 APPLICATION(S): 213 SOLUTION TOPICAL at 05:18

## 2021-10-31 RX ADMIN — SODIUM CHLORIDE 100 MILLILITER(S): 9 INJECTION, SOLUTION INTRAVENOUS at 07:29

## 2021-10-31 RX ADMIN — PROPOFOL 0.31 MICROGRAM(S)/KG/MIN: 10 INJECTION, EMULSION INTRAVENOUS at 13:57

## 2021-10-31 RX ADMIN — PROPOFOL 0.31 MICROGRAM(S)/KG/MIN: 10 INJECTION, EMULSION INTRAVENOUS at 10:48

## 2021-10-31 RX ADMIN — SODIUM CHLORIDE 100 MILLILITER(S): 9 INJECTION, SOLUTION INTRAVENOUS at 19:35

## 2021-10-31 RX ADMIN — CYPROHEPTADINE HYDROCHLORIDE 4 MILLIGRAM(S): 4 TABLET ORAL at 05:18

## 2021-10-31 RX ADMIN — PROPOFOL 0.31 MICROGRAM(S)/KG/MIN: 10 INJECTION, EMULSION INTRAVENOUS at 01:45

## 2021-10-31 RX ADMIN — PROPOFOL 0.31 MICROGRAM(S)/KG/MIN: 10 INJECTION, EMULSION INTRAVENOUS at 05:18

## 2021-10-31 RX ADMIN — CHLORHEXIDINE GLUCONATE 15 MILLILITER(S): 213 SOLUTION TOPICAL at 05:19

## 2021-10-31 RX ADMIN — MIDAZOLAM HYDROCHLORIDE 1.04 MG/KG/HR: 1 INJECTION, SOLUTION INTRAMUSCULAR; INTRAVENOUS at 19:33

## 2021-10-31 RX ADMIN — SODIUM CHLORIDE 50 MILLILITER(S): 9 INJECTION, SOLUTION INTRAVENOUS at 07:30

## 2021-10-31 RX ADMIN — FAMOTIDINE 20 MILLIGRAM(S): 10 INJECTION INTRAVENOUS at 05:18

## 2021-10-31 RX ADMIN — Medication 100 MILLIEQUIVALENT(S): at 09:48

## 2021-10-31 RX ADMIN — SODIUM CHLORIDE 50 MILLILITER(S): 9 INJECTION, SOLUTION INTRAVENOUS at 19:34

## 2021-10-31 NOTE — PROGRESS NOTE ADULT - ASSESSMENT
25 year old F hx of borderline personality disorder, anxiety, depression, substance abuse, prior suicide attept by ingestion, presents to ED and  reported taking Venlafaxine 150mg x 20 tab and bupropion 100mg x 20 tabs. After a period of observation in the ED patient developed  sinus tachycardia and  had a witnessed seizure.      - Intubated  and sedated with propofol or midazolam as per toxicology   - If OK with tox - hold sedation for  SAT and possible SBT  - s/p GI decontamination with charcoal with sorbitol  followed by polyethylene glycol    - DC cyproheptadine if ok with tox   - follow VEEG   - supplement hypokalemia   - check daily EKG    - I spoke with mom at bedside all questions answered    - DVT prophylaxis

## 2021-10-31 NOTE — PROGRESS NOTE ADULT - SUBJECTIVE AND OBJECTIVE BOX
Patient is sedated on propofol and versed comfortably on ventilator       T(F): 96 (10-31-21 @ 11:00), Max: 98.6 (10-31-21 @ 07:01)  HR: 96 (10-31-21 @ 10:48)  BP: 135/85 (10-31-21 @ 10:48)  RR: 18  SpO2: 100% (10-31-21 @ 10:48) (100% - 100%)    PHYSICAL EXAM:  GENERAL: NAD  HEAD:  Atraumatic, Normocephalic  EYES: EOMI, PERRLA, conjunctiva and sclera clear  NERVOUS SYSTEM:  no focal deficits   CHEST/LUNG: Clear to percussion bilaterally; No rales, rhonchi, wheezing, or rubs  HEART: Regular rate and rhythm; No murmurs, rubs, or gallops  ABDOMEN: Soft, Nontender, Nondistended; Bowel sounds present  EXTREMITIES:  2+ Peripheral Pulses, No clubbing, cyanosis, or edema    LABS  10-31    141  |  102  |  <3<L>  ----------------------------<  91  3.3<L>   |  29  |  <0.5<L>    Ca    8.5      31 Oct 2021 05:36  Phos  3.0     10-30  Mg     1.8     10-31    TPro  5.5<L>  /  Alb  3.3<L>  /  TBili  <0.2  /  DBili  x   /  AST  14  /  ALT  8   /  AlkPhos  69  10-31                          11.3   12.73 )-----------( 177      ( 31 Oct 2021 05:36 )             34.9     PT/INR - ( 30 Oct 2021 05:36 )   PT: 12.10 sec;   INR: 1.05 ratio         PTT - ( 30 Oct 2021 05:36 )  PTT:28.9 sec  Mode: Auto Mode: PRVC/ Volume Support  RR (machine): 12  TV (machine): 400  FiO2: 30      CARDIAC ENZYMES  Creatine Kinase, Serum: 159 (10-30 @ 05:36)  Creatine Kinase, Serum: 159 (10-29 @ 21:33)  Creatine Kinase, Serum: 220 (10-29 @ 12:44)  Creatine Kinase, Serum: 153 (10-29 @ 03:25)      < from: 12 Lead ECG (10.29.21 @ 09:36) >  Ventricular Rate 89 BPM    Atrial Rate 89 BPM    P-R Interval 150 ms    QRS Duration 88 ms    Q-T Interval 402 ms    QTC Calculation(Bazett) 489 ms    P Axis 76 degrees    R Axis 82 degrees    T Axis 54 degrees    Diagnosis Line Normal sinus rhythm    < end of copied text >      Culture Results:   <10,000 CFU/mL Normal Urogenital Zunilda (10-28-21)    RADIOLOGY  < from: Xray Chest 1 View- PORTABLE-Routine (Xray Chest 1 View- PORTABLE-Routine in AM.) (10.31.21 @ 06:24) >  Impression:    No radiographic evidence of acute cardiopulmonary disease.    < end of copied text >    MEDICATIONS  (STANDING):  chlorhexidine 0.12% Liquid 15 milliLiter(s) Oral Mucosa every 12 hours  chlorhexidine 4% Liquid 1 Application(s) Topical <User Schedule>  cyproheptadine 4 milliGRAM(s) Oral every 6 hours  dextrose 5%. 1000 milliLiter(s) (50 mL/Hr) IV Continuous <Continuous>  enoxaparin Injectable 40 milliGRAM(s) SubCutaneous daily  famotidine Injectable 20 milliGRAM(s) IV Push every 12 hours  lactated ringers. 1000 milliLiter(s) (100 mL/Hr) IV Continuous <Continuous>  midazolam Infusion 0.02 mG/kG/Hr (1.04 mL/Hr) IV Continuous <Continuous>  propofol Infusion 1 MICROgram(s)/kG/Min (0.31 mL/Hr) IV Continuous <Continuous>    MEDICATIONS  (PRN):  LORazepam   Injectable 2.5 milliGRAM(s) IV Push once PRN Seizure activity

## 2021-10-31 NOTE — PROGRESS NOTE ADULT - ASSESSMENT
25 year old F hx of borderline personality disorder, anxiety, depression, substance abuse, prior SI by ingestion, presents with ingestions of 2 grams bupropion and 2 gram venlafaxine. Initially asymptomatic in ED, but became tachycardic after two hours in the ED, s/p intubation, ventilation, on cyproheptadine.    -Currently hemodynamically stable on ventilation and sedation  -Recommend weaning sedation and ventilation as tolerated, can trial SBT  -Can discontinue cyproheptadine as patient currently does not show signs of serotonin syndrome. After discontinuing, monitor for signs of serotonin syndrome (hyperthermia, hyperreflexia, clonus, agitation/mental status changes)  -Would be less likely to have prolonged toxicity from recent buproprion/venlafaxine ingestion, but can still monitor for seizures and hemodynamic instability.    Vahe Epperson MD  Toxicology Fellow

## 2021-10-31 NOTE — EEG REPORT - NS EEG TEXT BOX
VIDEO EEG FINAL REPORT      PREETHI NASH    25y Female  MRN [id number]      Recording Technique: The patient underwent continuous video-EEG monitoring using Telemetry System hardware on the XL [employer] Digital System. EEG and video data were stored on a computer hard drive with important events saved in digital archive files. The material was reviewed by a physician (electroencephalographer/epileptologist) on a daily basis. Rolo and seizure detection algorithms were utilized if needed. An EEG technician attended to the patient for 8 to 10 hours per day. The patient was observed by the epilepsy nursing staff 24 hrs per day. The epilepsy center neurologist was available in person or on call 24 hours per day.    Placement and Labeling of Eelectrodes: The EEG was performed using at least 20 channel referential EEG connections (coronal over temporal over parasaggital montage) with inferior temporal electrodes when indicting and using all standard 10-20 electrode placement with EKG, with additional electrodes placed in the inferior temporal region using the modified 10-10 montage electrode placements for elective admissions, or if deemed necessary. Recording was at a sampling rate of 256 samples per second per channel. Time syncronized digital video recording was done simultaneously with EEG recording. A low light infrared camera was used for low light recording.      HPI:  25 year old F hx of borderline personality disorder, anxiety, depression, substance abuse, prior SI by ingestion, presents to ED with ingestions. Patient reported taking Venlafaxine 150mg x 20 tab and bupropion 100mg x 20 tabs at ~3pm.   (28 Oct 2021 21:56)      MEDICATIONS  (STANDING):  chlorhexidine 0.12% Liquid 15 milliLiter(s) Oral Mucosa every 12 hours  chlorhexidine 4% Liquid 1 Application(s) Topical <User Schedule>  cyproheptadine 4 milliGRAM(s) Oral every 6 hours  enoxaparin Injectable 40 milliGRAM(s) SubCutaneous daily  famotidine Injectable 20 milliGRAM(s) IV Push every 12 hours  lactated ringers. 1000 milliLiter(s) (100 mL/Hr) IV Continuous <Continuous>  midazolam Infusion 0.02 mG/kG/Hr (1.04 mL/Hr) IV Continuous <Continuous>  propofol Infusion 1 MICROgram(s)/kG/Min (0.31 mL/Hr) IV Continuous <Continuous>    MEDICATIONS  (PRN):  LORazepam   Injectable 2.5 milliGRAM(s) IV Push once PRN Seizure activity        AWAKE  No awake recording present       ASLEEP  Rudimentary N2 sleep tranients with an abundance of sleep transients    Background: Diffuse delta frequencies with intermixed fast activity and spindle patterns. There is an increase in continuity from the previous days' recording with intermittent periods of attenuation lasting for 1 seconds.     GENERALIZED SLOWING  Continuous   FOCAL SLOWING    None  BREACH ARTIFACT  None    ACTIVATION PROCEDURES  Hyperventilation:  Photic Stimulation:      SLEEP DEPRIVATION/MEDICATION REDUCTION      EPILEPTIFORM ACTIVITY      None      IMPRESSION    Abnormal VEE. Diffuse slow background  2. Resolution of burst suppression pattern with mild discontinuity.   3. No epileptiform activity or seizrures  CLINICAL CORRELATION    The findings are consistent with diffuse cerebral dysfunction and sedation-induced slowing. Less discontinuous background as sedation diminishes.

## 2021-10-31 NOTE — PROGRESS NOTE ADULT - SUBJECTIVE AND OBJECTIVE BOX
Neurology Follow up note    Name  PREETHI NASH    HPI:  25 year old F hx of borderline personality disorder, anxiety, depression, substance abuse, prior SI by ingestion, presents to ED with ingestions. Patient reported taking Venlafaxine 150mg x 20 tab and bupropion 100mg x 20 tabs at ~3pm.   (28 Oct 2021 21:56)      Interval History -Patient seen and examined and no events overnight.  VEEG running and shows diffuse delta and no epileptiform abnormalities during my evaluation.  Full 24 hour VEEG report pending            Vital Signs Last 24 Hrs  T(C): 37 (31 Oct 2021 07:01), Max: 37 (31 Oct 2021 07:01)  T(F): 98.6 (31 Oct 2021 07:01), Max: 98.6 (31 Oct 2021 07:01)  HR: 91 (31 Oct 2021 07:04) (70 - 108)  BP: 116/69 (31 Oct 2021 07:04) (114/73 - 132/94)  BP(mean): 87 (31 Oct 2021 07:04) (85 - 108)  RR: 20 (31 Oct 2021 07:04) (11 - 33)  SpO2: 100% (31 Oct 2021 07:04) (100% - 100%)  ICU Vital Signs Last 24 Hrs  T(C): 37 (31 Oct 2021 07:01), Max: 37 (31 Oct 2021 07:01)  T(F): 98.6 (31 Oct 2021 07:01), Max: 98.6 (31 Oct 2021 07:01)  HR: 91 (31 Oct 2021 07:04) (70 - 108)  BP: 116/69 (31 Oct 2021 07:04) (114/73 - 132/94)  BP(mean): 87 (31 Oct 2021 07:04) (85 - 108)  ABP: --  ABP(mean): --  RR: 20 (31 Oct 2021 07:04) (11 - 33)  SpO2: 100% (31 Oct 2021 07:04) (100% - 100%)          Neurological Exam:   Intubated and sedated  Not following commands  Pupils pin point  No response to noxious stimuli    Medications  chlorhexidine 0.12% Liquid 15 milliLiter(s) Oral Mucosa every 12 hours  chlorhexidine 4% Liquid 1 Application(s) Topical <User Schedule>  cyproheptadine 4 milliGRAM(s) Oral every 6 hours  dextrose 5%. 1000 milliLiter(s) IV Continuous <Continuous>  enoxaparin Injectable 40 milliGRAM(s) SubCutaneous daily  famotidine Injectable 20 milliGRAM(s) IV Push every 12 hours  lactated ringers. 1000 milliLiter(s) IV Continuous <Continuous>  LORazepam   Injectable 2.5 milliGRAM(s) IV Push once PRN  midazolam Infusion 0.02 mG/kG/Hr IV Continuous <Continuous>  propofol Infusion 1 MICROgram(s)/kG/Min IV Continuous <Continuous>      Lab                        11.3   12.73 )-----------( 177      ( 31 Oct 2021 05:36 )             34.9     10-31    141  |  102  |  <3<L>  ----------------------------<  91  3.3<L>   |  29  |  <0.5<L>    Ca    8.5      31 Oct 2021 05:36  Phos  3.0     10-30  Mg     1.8     10-31    TPro  5.5<L>  /  Alb  3.3<L>  /  TBili  <0.2  /  DBili  x   /  AST  14  /  ALT  8   /  AlkPhos  69  10-31    CAPILLARY BLOOD GLUCOSE      POCT Blood Glucose.: 83 mg/dL (31 Oct 2021 05:56)  POCT Blood Glucose.: 147 mg/dL (31 Oct 2021 02:59)  POCT Blood Glucose.: 104 mg/dL (30 Oct 2021 21:59)  POCT Blood Glucose.: 70 mg/dL (30 Oct 2021 19:15)  POCT Blood Glucose.: 100 mg/dL (30 Oct 2021 14:38)  POCT Blood Glucose.: 129 mg/dL (30 Oct 2021 09:41)    LIVER FUNCTIONS - ( 31 Oct 2021 05:36 )  Alb: 3.3 g/dL / Pro: 5.5 g/dL / ALK PHOS: 69 U/L / ALT: 8 U/L / AST: 14 U/L / GGT: x           PT/INR - ( 30 Oct 2021 05:36 )   PT: 12.10 sec;   INR: 1.05 ratio         PTT - ( 30 Oct 2021 05:36 )  PTT:28.9 sec    Radiology  CTH - none    Other studies:   Abnormal VEE. Diffuse slow background  2. Burst suppression pattern   3. No epileptiform activity.   CLINICAL CORRELATION    The findings are consistent with diffuse cerebral dysfunction and sedation-induced burst suppression pattern.    Assessment- This is a 25y year old Female presenting with drug overdose still on sedation and intubated. No seizures or epileptiform discharges on first 24 hours of VEEG.    Plan  1. F/u VEEG  2. If no epileptiform abnormalities or seizures would start tapering sedation as tolerated  3. Obtain CTH when stable

## 2021-10-31 NOTE — PROGRESS NOTE ADULT - SUBJECTIVE AND OBJECTIVE BOX
MEDICAL TOXICOLOGY PROGRESS NOTE    Overnight events:  No acute events overnight, on vent, sedated    MEDICATIONS  (STANDING):  chlorhexidine 0.12% Liquid 15 milliLiter(s) Oral Mucosa every 12 hours  chlorhexidine 4% Liquid 1 Application(s) Topical <User Schedule>  dextrose 5%. 1000 milliLiter(s) (50 mL/Hr) IV Continuous <Continuous>  enoxaparin Injectable 40 milliGRAM(s) SubCutaneous daily  famotidine Injectable 20 milliGRAM(s) IV Push every 12 hours  lactated ringers. 1000 milliLiter(s) (100 mL/Hr) IV Continuous <Continuous>  midazolam Infusion 0.02 mG/kG/Hr (1.04 mL/Hr) IV Continuous <Continuous>  propofol Infusion 1 MICROgram(s)/kG/Min (0.31 mL/Hr) IV Continuous <Continuous>    MEDICATIONS  (PRN):  LORazepam   Injectable 2.5 milliGRAM(s) IV Push once PRN Seizure activity      Vital Signs Last 24 Hrs  T(C): 35.6 (31 Oct 2021 11:00), Max: 37 (31 Oct 2021 07:01)  T(F): 96 (31 Oct 2021 11:00), Max: 98.6 (31 Oct 2021 07:01)  HR: 96 (31 Oct 2021 10:48) (70 - 108)  BP: 135/85 (31 Oct 2021 10:48) (114/73 - 135/85)  BP(mean): 104 (31 Oct 2021 10:48) (85 - 108)  RR: 36 (31 Oct 2021 11:00) (13 - 36)  SpO2: 100% (31 Oct 2021 10:48) (100% - 100%)    Allergies  No Known Allergies  Intolerances          REVIEW OF SYSTEMS:   __X___unable to perform due to intoxication, dementia, or illness  General:  no fever, chills, malaise, change in weight or fatigue  Eyes:  no blurry vision, double vision, or diminished acuity  ENT:  no tinnitus, decreased acuity, epistaxis, sore throat, dysphagia  Cardiac: no chest pain, syncope, or palpitations  Lung:  no cough, shortness of breath, stridor, or wheezing  GI:  no abdominal pain, nausea, vomiting, diarrhea, or constipation  Genitourinary: no dysuria, hematuria, or incontinence  Ortho: no joint pain, swelling, myalgias, atrophy, or spasm  Skin: no rash, lesions, or pruritis  Neuro:  no headache, weakness/numbness, ataxia, change in speech, dizziness, tremor, or seizure  Psych: ____depression, ____anxiety, ____mania, _____suicidal, ____homicidal  Endocrine: no polydypsia, polyuria, heat/cold intolerance  Hematologic: no bleeding, bruising, petechiae, or adenopathy  Immune:  no rhinitis, atopy, immunocompromise, HIV, or cancer    PHYSICAL EXAM  Vital Signs Last 24 Hrs  T(C): 35.6 (31 Oct 2021 11:00), Max: 37 (31 Oct 2021 07:01)  T(F): 96 (31 Oct 2021 11:00), Max: 98.6 (31 Oct 2021 07:01)  HR: 96 (31 Oct 2021 10:48) (70 - 108)  BP: 135/85 (31 Oct 2021 10:48) (114/73 - 135/85)  BP(mean): 104 (31 Oct 2021 10:48) (85 - 108)  RR: 36 (31 Oct 2021 11:00) (13 - 36)  SpO2: 100% (31 Oct 2021 10:48) (100% - 100%)    GENERAL: NAD  HEAD:  Atraumatic, Normocephalic  EYES: EOMI, PERRLA, conjunctiva and sclera clear  NERVOUS SYSTEM:  no focal deficits, intubated, sedated  CHEST/LUNG: lungs CTAB, no crackles, wheezes, rhonchi, on mechanical ventilation  HEART: Regular rate and rhythm; No murmurs, rubs, or gallops  ABDOMEN: Soft, Nontender, Nondistended; Bowel sounds present  EXTREMITIES:  2+ Peripheral Pulses, No clubbing, cyanosis, or edema    SIGNIFICANT LABORATORY STUDIES:                        11.3   12.73 )-----------( 177      ( 31 Oct 2021 05:36 )             34.9       10-31    141  |  102  |  <3<L>  ----------------------------<  91  3.3<L>   |  29  |  <0.5<L>    Ca    8.5      31 Oct 2021 05:36  Phos  3.0     10-30  Mg     1.8     10-31    TPro  5.5<L>  /  Alb  3.3<L>  /  TBili  <0.2  /  DBili  x   /  AST  14  /  ALT  8   /  AlkPhos  69  10-31      PT/INR - ( 30 Oct 2021 05:36 )   PT: 12.10 sec;   INR: 1.05 ratio         PTT - ( 30 Oct 2021 05:36 )  PTT:28.9 sec

## 2021-11-01 LAB
ALBUMIN SERPL ELPH-MCNC: 3.3 G/DL — LOW (ref 3.5–5.2)
ALP SERPL-CCNC: 73 U/L — SIGNIFICANT CHANGE UP (ref 30–115)
ALT FLD-CCNC: 9 U/L — SIGNIFICANT CHANGE UP (ref 0–41)
ANION GAP SERPL CALC-SCNC: 8 MMOL/L — SIGNIFICANT CHANGE UP (ref 7–14)
AST SERPL-CCNC: 16 U/L — SIGNIFICANT CHANGE UP (ref 0–41)
BASE EXCESS BLDA CALC-SCNC: 6.9 MMOL/L — HIGH (ref -2–3)
BASOPHILS # BLD AUTO: 0.03 K/UL — SIGNIFICANT CHANGE UP (ref 0–0.2)
BASOPHILS NFR BLD AUTO: 0.3 % — SIGNIFICANT CHANGE UP (ref 0–1)
BILIRUB SERPL-MCNC: <0.2 MG/DL — SIGNIFICANT CHANGE UP (ref 0.2–1.2)
BUN SERPL-MCNC: 4 MG/DL — LOW (ref 10–20)
CALCIUM SERPL-MCNC: 8.4 MG/DL — LOW (ref 8.5–10.1)
CHLORIDE SERPL-SCNC: 104 MMOL/L — SIGNIFICANT CHANGE UP (ref 98–110)
CO2 SERPL-SCNC: 29 MMOL/L — SIGNIFICANT CHANGE UP (ref 17–32)
CREAT SERPL-MCNC: <0.5 MG/DL — LOW (ref 0.7–1.5)
EOSINOPHIL # BLD AUTO: 0.17 K/UL — SIGNIFICANT CHANGE UP (ref 0–0.7)
EOSINOPHIL NFR BLD AUTO: 1.6 % — SIGNIFICANT CHANGE UP (ref 0–8)
GLUCOSE BLDC GLUCOMTR-MCNC: 100 MG/DL — HIGH (ref 70–99)
GLUCOSE BLDC GLUCOMTR-MCNC: 121 MG/DL — HIGH (ref 70–99)
GLUCOSE BLDC GLUCOMTR-MCNC: 122 MG/DL — HIGH (ref 70–99)
GLUCOSE BLDC GLUCOMTR-MCNC: 129 MG/DL — HIGH (ref 70–99)
GLUCOSE BLDC GLUCOMTR-MCNC: 130 MG/DL — HIGH (ref 70–99)
GLUCOSE BLDC GLUCOMTR-MCNC: 97 MG/DL — SIGNIFICANT CHANGE UP (ref 70–99)
GLUCOSE SERPL-MCNC: 107 MG/DL — HIGH (ref 70–99)
HCO3 BLDA-SCNC: 32 MMOL/L — HIGH (ref 21–28)
HCT VFR BLD CALC: 35.3 % — LOW (ref 37–47)
HGB BLD-MCNC: 11.5 G/DL — LOW (ref 12–16)
HOROWITZ INDEX BLDA+IHG-RTO: 30 — SIGNIFICANT CHANGE UP
IMM GRANULOCYTES NFR BLD AUTO: 0.5 % — HIGH (ref 0.1–0.3)
LYMPHOCYTES # BLD AUTO: 1.48 K/UL — SIGNIFICANT CHANGE UP (ref 1.2–3.4)
LYMPHOCYTES # BLD AUTO: 13.7 % — LOW (ref 20.5–51.1)
MAGNESIUM SERPL-MCNC: 1.9 MG/DL — SIGNIFICANT CHANGE UP (ref 1.8–2.4)
MCHC RBC-ENTMCNC: 30.8 PG — SIGNIFICANT CHANGE UP (ref 27–31)
MCHC RBC-ENTMCNC: 32.6 G/DL — SIGNIFICANT CHANGE UP (ref 32–37)
MCV RBC AUTO: 94.6 FL — SIGNIFICANT CHANGE UP (ref 81–99)
MONOCYTES # BLD AUTO: 1.29 K/UL — HIGH (ref 0.1–0.6)
MONOCYTES NFR BLD AUTO: 11.9 % — HIGH (ref 1.7–9.3)
NEUTROPHILS # BLD AUTO: 7.79 K/UL — HIGH (ref 1.4–6.5)
NEUTROPHILS NFR BLD AUTO: 72 % — SIGNIFICANT CHANGE UP (ref 42.2–75.2)
NRBC # BLD: 0 /100 WBCS — SIGNIFICANT CHANGE UP (ref 0–0)
PCO2 BLDA: 46 MMHG — HIGH (ref 32–35)
PH BLDA: 7.45 — SIGNIFICANT CHANGE UP (ref 7.35–7.45)
PLATELET # BLD AUTO: 194 K/UL — SIGNIFICANT CHANGE UP (ref 130–400)
PO2 BLDA: 116 MMHG — HIGH (ref 83–108)
POTASSIUM SERPL-MCNC: 4.1 MMOL/L — SIGNIFICANT CHANGE UP (ref 3.5–5)
POTASSIUM SERPL-SCNC: 4.1 MMOL/L — SIGNIFICANT CHANGE UP (ref 3.5–5)
PROT SERPL-MCNC: 5.5 G/DL — LOW (ref 6–8)
RBC # BLD: 3.73 M/UL — LOW (ref 4.2–5.4)
RBC # FLD: 12.9 % — SIGNIFICANT CHANGE UP (ref 11.5–14.5)
SAO2 % BLDA: 99.6 % — HIGH (ref 94–98)
SODIUM SERPL-SCNC: 141 MMOL/L — SIGNIFICANT CHANGE UP (ref 135–146)
WBC # BLD: 10.81 K/UL — HIGH (ref 4.8–10.8)
WBC # FLD AUTO: 10.81 K/UL — HIGH (ref 4.8–10.8)

## 2021-11-01 PROCEDURE — 71045 X-RAY EXAM CHEST 1 VIEW: CPT | Mod: 26

## 2021-11-01 PROCEDURE — 93010 ELECTROCARDIOGRAM REPORT: CPT

## 2021-11-01 PROCEDURE — 99232 SBSQ HOSP IP/OBS MODERATE 35: CPT | Mod: GC

## 2021-11-01 PROCEDURE — 99233 SBSQ HOSP IP/OBS HIGH 50: CPT

## 2021-11-01 PROCEDURE — 95720 EEG PHY/QHP EA INCR W/VEEG: CPT

## 2021-11-01 RX ORDER — DEXMEDETOMIDINE HYDROCHLORIDE IN 0.9% SODIUM CHLORIDE 4 UG/ML
0.05 INJECTION INTRAVENOUS
Qty: 200 | Refills: 0 | Status: DISCONTINUED | OUTPATIENT
Start: 2021-11-01 | End: 2021-11-01

## 2021-11-01 RX ORDER — DEXMEDETOMIDINE HYDROCHLORIDE IN 0.9% SODIUM CHLORIDE 4 UG/ML
0.05 INJECTION INTRAVENOUS
Qty: 400 | Refills: 0 | Status: DISCONTINUED | OUTPATIENT
Start: 2021-11-01 | End: 2021-11-04

## 2021-11-01 RX ORDER — MIDAZOLAM HYDROCHLORIDE 1 MG/ML
2 INJECTION, SOLUTION INTRAMUSCULAR; INTRAVENOUS ONCE
Refills: 0 | Status: DISCONTINUED | OUTPATIENT
Start: 2021-11-01 | End: 2021-11-01

## 2021-11-01 RX ADMIN — PROPOFOL 0.31 MICROGRAM(S)/KG/MIN: 10 INJECTION, EMULSION INTRAVENOUS at 04:56

## 2021-11-01 RX ADMIN — PROPOFOL 0.31 MICROGRAM(S)/KG/MIN: 10 INJECTION, EMULSION INTRAVENOUS at 06:24

## 2021-11-01 RX ADMIN — SODIUM CHLORIDE 50 MILLILITER(S): 9 INJECTION, SOLUTION INTRAVENOUS at 06:24

## 2021-11-01 RX ADMIN — SODIUM CHLORIDE 100 MILLILITER(S): 9 INJECTION, SOLUTION INTRAVENOUS at 06:25

## 2021-11-01 RX ADMIN — CHLORHEXIDINE GLUCONATE 1 APPLICATION(S): 213 SOLUTION TOPICAL at 06:24

## 2021-11-01 RX ADMIN — CHLORHEXIDINE GLUCONATE 15 MILLILITER(S): 213 SOLUTION TOPICAL at 06:24

## 2021-11-01 RX ADMIN — MIDAZOLAM HYDROCHLORIDE 2 MILLIGRAM(S): 1 INJECTION, SOLUTION INTRAMUSCULAR; INTRAVENOUS at 09:04

## 2021-11-01 RX ADMIN — CHLORHEXIDINE GLUCONATE 15 MILLILITER(S): 213 SOLUTION TOPICAL at 18:00

## 2021-11-01 RX ADMIN — MIDAZOLAM HYDROCHLORIDE 1.04 MG/KG/HR: 1 INJECTION, SOLUTION INTRAMUSCULAR; INTRAVENOUS at 06:25

## 2021-11-01 RX ADMIN — DEXMEDETOMIDINE HYDROCHLORIDE IN 0.9% SODIUM CHLORIDE 0.65 MICROGRAM(S)/KG/HR: 4 INJECTION INTRAVENOUS at 13:01

## 2021-11-01 RX ADMIN — FAMOTIDINE 20 MILLIGRAM(S): 10 INJECTION INTRAVENOUS at 17:59

## 2021-11-01 RX ADMIN — FAMOTIDINE 20 MILLIGRAM(S): 10 INJECTION INTRAVENOUS at 08:53

## 2021-11-01 RX ADMIN — ENOXAPARIN SODIUM 40 MILLIGRAM(S): 100 INJECTION SUBCUTANEOUS at 11:45

## 2021-11-01 NOTE — PROGRESS NOTE ADULT - SUBJECTIVE AND OBJECTIVE BOX
[id number]  Neurology Follow up note    Name  PREETHI NASH    HPI:  25 year old F hx of borderline personality disorder, anxiety, depression, substance abuse, prior SI by ingestion, presents to ED with ingestions. Patient reported taking Venlafaxine 150mg x 20 tab and bupropion 100mg x 20 tabs at ~3pm.   (28 Oct 2021 21:56)      Interval History - Pt seen at bedside with Dr. Calle, patient was recently withdrawn off sedation in an attempt at breathing trial which according to RN was unsuccessful. Pt continues on vent with sedation measures in place.  Mother present at bedside.          Vital Signs Last 24 Hrs  T(C): 35.6 (01 Nov 2021 11:00), Max: 37.3 (01 Nov 2021 09:49)  T(F): 96.1 (01 Nov 2021 11:00), Max: 99.1 (01 Nov 2021 09:49)  HR: 77 (01 Nov 2021 10:00) (71 - 115)  BP: 132/84 (01 Nov 2021 10:00) (119/65 - 154/93)  BP(mean): 103 (01 Nov 2021 10:00) (84 - 119)  RR: 12 (01 Nov 2021 11:00) (12 - 30)  SpO2: 100% (01 Nov 2021 10:00) (99% - 100%)  ICU Vital Signs Last 24 Hrs  T(C): 35.6 (01 Nov 2021 11:00), Max: 37.3 (01 Nov 2021 09:49)  T(F): 96.1 (01 Nov 2021 11:00), Max: 99.1 (01 Nov 2021 09:49)  HR: 77 (01 Nov 2021 10:00) (71 - 115)  BP: 132/84 (01 Nov 2021 10:00) (119/65 - 154/93)  BP(mean): 103 (01 Nov 2021 10:00) (84 - 119)  ABP: --  ABP(mean): --  RR: 12 (01 Nov 2021 11:00) (12 - 30)  SpO2: 100% (01 Nov 2021 10:00) (99% - 100%)          Neurological Exam:     Mental status: Sedated and unresponsive  Cranial nerves:  pupils equally round and reactive to light, + corneal reflex intact  Motor:  Normal bulk and tone - patient remains sedated  Sensation: non-responsive to all stimuli  Coordination: unable to assess - remains sedated  Reflexes: Hyperreflexia l/e, downgoing toes bilaterally, + clonus b/l feet        Medications  chlorhexidine 0.12% Liquid 15 milliLiter(s) Oral Mucosa every 12 hours  chlorhexidine 4% Liquid 1 Application(s) Topical <User Schedule>  dexMEDEtomidine Infusion 0.05 MICROgram(s)/kG/Hr IV Continuous <Continuous>  enoxaparin Injectable 40 milliGRAM(s) SubCutaneous daily  famotidine Injectable 20 milliGRAM(s) IV Push every 12 hours  LORazepam   Injectable 2.5 milliGRAM(s) IV Push once PRN  propofol Infusion 1 MICROgram(s)/kG/Min IV Continuous <Continuous>      Lab                        11.5   10.81 )-----------( 194      ( 01 Nov 2021 05:32 )             35.3     11-01    141  |  104  |  4<L>  ----------------------------<  107<H>  4.1   |  29  |  <0.5<L>    Ca    8.4<L>      01 Nov 2021 05:32  Mg     1.9     11-01    TPro  5.5<L>  /  Alb  3.3<L>  /  TBili  <0.2  /  DBili  x   /  AST  16  /  ALT  9   /  AlkPhos  73  11-01    CAPILLARY BLOOD GLUCOSE      POCT Blood Glucose.: 97 mg/dL (01 Nov 2021 09:48)  POCT Blood Glucose.: 100 mg/dL (01 Nov 2021 05:42)  POCT Blood Glucose.: 122 mg/dL (01 Nov 2021 01:49)  POCT Blood Glucose.: 119 mg/dL (31 Oct 2021 21:47)  POCT Blood Glucose.: 97 mg/dL (31 Oct 2021 17:41)  POCT Blood Glucose.: 113 mg/dL (31 Oct 2021 13:47)    LIVER FUNCTIONS - ( 01 Nov 2021 05:32 )  Alb: 3.3 g/dL / Pro: 5.5 g/dL / ALK PHOS: 73 U/L / ALT: 9 U/L / AST: 16 U/L / GGT: x                 Assessment- This is a 25y year old Female presenting with drug overdose still on sedation and intubated. No seizures or epileptiform discharges on first 24 hours of VEEG.    Plan  1. Will need VEEG when sedation is weaned.  2. Continue medical management of patient and vent management  3. Obtain follow up CTH when stable  4. Neurology team will follow

## 2021-11-01 NOTE — PROGRESS NOTE ADULT - ASSESSMENT
25 year old F hx of borderline personality disorder, anxiety, depression, substance abuse, prior SI by ingestion, presents to ED with ingestions. Patient reported taking Venlafaxine 150mg x 20 tab and bupropion 100mg x 20 tabs at ~3pm. Denies other co-ingestions. In the ED, the patient is asymptomatic, with normal vitals and ekg, baseline mental status, afebrile, no hyperreflexia or clonus. Patient intubated in the ICU as per Toxicology, had hyperreflexia and some myoclonus and was started on Cyproheptadine.    # Drug Overdose with Venlafaxine and Bupropion  # Serotonin Syndrome  # Suicide Attempt  # H/O Suicide Attempt  # Borderline personality disorder  # Anxiety/depression  # H/O Substance Abuse  - Took 150x20 of Venlafaxine, Bupropion 100 x 20  - s/p Activated charcoal given 50grams via OGT as per toxicology  - s/p intubation by anesthesia  - s/p Polyethylene Glycol soln given as per toxicology  - Golytely q1h until rectal effluent clear or total of 10L effluent is excreted  - Toxicology recommended a trial of SAT and SBT   -Cyproheptadine was held, no evidence of active serotonin syndrome     # Seizures  - Likely from overdose  - Ativan PRN  - EEG was done, did not show any epileptiform activity     # Hypocalcemia  - Positive Trousseau + Episode of seizure  - Will supplement with IV Calcium Gluconate 2g  - Calcium levels WNL      DVT: Lovenox  GI: Protonix  Dispo: Acute

## 2021-11-01 NOTE — CHART NOTE - NSCHARTNOTEFT_GEN_A_CORE
Registered Dietitian Follow-Up     Patient Profile Reviewed                           Yes [X  No []       Pertinent  Information:  pt is 25 year old female with hx of borderline personality disorder, anxiety, depression, substance abuse with prior suicide attempts presents with overdose of wellbutrein and effexor, s/p seizure in ED, intubated.  s/p activated charcoal, polyethylene glycol and Golytely via OGT.  Remains sedated with propofol presently at 20 ml/hr (528 kcals) , EN via OGT tolerating well.       Diet order: Jevity 1.2 at 30 ml/hr via OGT      Anthropometrics:  - Ht. 165.1 cm   - Wt. 53.4 kgs today vs 52.1 kg upon admission   - %wt change  - BMI   - IBW      Pertinent Lab Data: 11/1/21: wbc 10.81H, hgb 11.5L, hct 35.3L      Pertinent Meds:  MEDICATIONS  (STANDING):  chlorhexidine 0.12% Liquid 15 milliLiter(s) Oral Mucosa every 12 hours  chlorhexidine 4% Liquid 1 Application(s) Topical <User Schedule>  dexMEDEtomidine Infusion 0.05 MICROgram(s)/kG/Hr (0.65 mL/Hr) IV Continuous <Continuous>  enoxaparin Injectable 40 milliGRAM(s) SubCutaneous daily  famotidine Injectable 20 milliGRAM(s) IV Push every 12 hours  propofol Infusion 1 MICROgram(s)/kG/Min (0.31 mL/Hr) IV Continuous <Continuous>    MEDICATIONS  (PRN):  LORazepam   Injectable 2.5 milliGRAM(s) IV Push once PRN Seizure activity       Physical Findings:  - Appearance: sedated, intubated to vent   - GI function: + BS, + formed BM   - Tubes: OGT   - Oral/Mouth cavity:  - Skin: intact      Nutrition Requirements  Weight Used: 52.1 kg      Estimated Energy Needs: 1572 kcals CHLOE state, 63-73 g pro (1.2-1.4g/kg/BW) estimated fluid needs as per MD           Nutrient Intake: present En regimen provides 864 kcals, 40g protein, 720 ml total volume. meeting <75% of estimated nutrient needs        [] Previous Nutrition Diagnosis:            [X] Ongoing          [] Resolved       Nutrition Intervention:  2/2 high infusion rate of propofol recommend to change feeds to Vital HP at 20 ml/hr increase as tolerated to goal rate of 35 ml/hr will provide 840 kcals+528 from propofol, 72g protein and 1044 ml total volume, meeting >90% of estimated nutrient needs     Goal/Expected Outcome: pt to tolerate EN and meet >90% of estimated nutrient needs      Indicator/Monitoring: RD to monitor tolerance to EN, labs/meds, NFPF and f/u as needed within 2-4 days

## 2021-11-01 NOTE — EEG REPORT - NS EEG TEXT BOX
Epilepsy Attending Note:     PREETHI NASH    25y Female  MRN [id number]    Vital Signs Last 24 Hrs  T(C): 36.6 (01 Nov 2021 07:01), Max: 37.2 (31 Oct 2021 16:00)  T(F): 97.8 (01 Nov 2021 07:01), Max: 99 (31 Oct 2021 23:01)  HR: 89 (01 Nov 2021 08:37) (71 - 115)  BP: 120/74 (01 Nov 2021 07:00) (114/70 - 137/85)  BP(mean): 91 (01 Nov 2021 07:00) (84 - 108)  RR: 31 (01 Nov 2021 07:01) (12 - 36)  SpO2: 100% (01 Nov 2021 08:37) (99% - 100%)                          11.5   10.81 )-----------( 194      ( 01 Nov 2021 05:32 )             35.3       11-01    141  |  104  |  4<L>  ----------------------------<  107<H>  4.1   |  29  |  <0.5<L>    Ca    8.4<L>      01 Nov 2021 05:32  Mg     1.9     11-01    TPro  5.5<L>  /  Alb  3.3<L>  /  TBili  <0.2  /  DBili  x   /  AST  16  /  ALT  9   /  AlkPhos  73  11-01      MEDICATIONS  (STANDING):  chlorhexidine 0.12% Liquid 15 milliLiter(s) Oral Mucosa every 12 hours  chlorhexidine 4% Liquid 1 Application(s) Topical <User Schedule>  dextrose 5%. 1000 milliLiter(s) (50 mL/Hr) IV Continuous <Continuous>  enoxaparin Injectable 40 milliGRAM(s) SubCutaneous daily  famotidine Injectable 20 milliGRAM(s) IV Push every 12 hours  lactated ringers. 1000 milliLiter(s) (100 mL/Hr) IV Continuous <Continuous>  midazolam Infusion 0.02 mG/kG/Hr (1.04 mL/Hr) IV Continuous <Continuous>  propofol Infusion 1 MICROgram(s)/kG/Min (0.31 mL/Hr) IV Continuous <Continuous>    MEDICATIONS  (PRN):  LORazepam   Injectable 2.5 milliGRAM(s) IV Push once PRN Seizure activity            VEEG in the last 24 hours: intubated and sedated    Background--------sedated sleep with brief periods of partial arousal. presented as medium to  high amplitude delta     Focal and generalized slowing-----moderate generalized slowing    Interictal activity---none    Events---none    Seizures----none    Impression:  abnormal due to the generalized slowing    Plan - as/neurology team

## 2021-11-01 NOTE — PROGRESS NOTE ADULT - SUBJECTIVE AND OBJECTIVE BOX
Patient is a 25y old  Female who presents with a chief complaint of Drug overdose (01 Nov 2021 08:22)      Over Night Events:  Patient seen and examined.   still vented on sedation propofol   on VEEG   on LR     ROS:  See HPI    PHYSICAL EXAM    ICU Vital Signs Last 24 Hrs  T(C): 36.6 (01 Nov 2021 07:01), Max: 37.2 (31 Oct 2021 16:00)  T(F): 97.8 (01 Nov 2021 07:01), Max: 99 (31 Oct 2021 23:01)  HR: 76 (01 Nov 2021 07:00) (71 - 115)  BP: 120/74 (01 Nov 2021 07:00) (114/70 - 137/85)  BP(mean): 91 (01 Nov 2021 07:00) (84 - 108)  ABP: --  ABP(mean): --  RR: 31 (01 Nov 2021 07:01) (12 - 36)  SpO2: 100% (01 Nov 2021 07:00) (99% - 100%)      General: on sedation   HEENT:    et tube             Lymph Nodes: NO cervical LN   Lungs: Bilateral BS  Cardiovascular: Regular   Abdomen: Soft, Positive BS  Extremities: No clubbing   Skin: warm   Neurological: positive gag   Musculoskeletal: move all ext     I&O's Detail    31 Oct 2021 07:01  -  01 Nov 2021 07:00  --------------------------------------------------------  IN:    dextrose 5%: 1050 mL    Enteral Tube Flush: 130 mL    IV PiggyBack: 100 mL    Jevity 1.2: 630 mL    Lactated Ringers: 2100 mL    Midazolam: 118 mL    Propofol: 442 mL  Total IN: 4570 mL    OUT:    Indwelling Catheter - Urethral (mL): 3845 mL    Rectal Tube (mL): 50 mL  Total OUT: 3895 mL    Total NET: 675 mL      01 Nov 2021 07:01  -  01 Nov 2021 08:31  --------------------------------------------------------  IN:  Total IN: 0 mL    OUT:    Indwelling Catheter - Urethral (mL): 750 mL  Total OUT: 750 mL    Total NET: -750 mL          LABS:                          11.5   10.81 )-----------( 194      ( 01 Nov 2021 05:32 )             35.3         01 Nov 2021 05:32    141    |  104    |  4      ----------------------------<  107    4.1     |  29     |  <0.5     Ca    8.4        01 Nov 2021 05:32  Mg     1.9       01 Nov 2021 05:32    TPro  5.5    /  Alb  3.3    /  TBili  <0.2   /  DBili  x      /  AST  16     /  ALT  9      /  AlkPhos  73     01 Nov 2021 05:32  Amylase x     lipase x                                                                                            Lactate, Blood: 1.3 mmol/L (10-30-21 @ 05:36)  Lactate, Blood: 1.3 mmol/L (10-29-21 @ 21:33)  Lactate, Blood: 1.2 mmol/L (10-29-21 @ 15:00)  Lactate, Blood: 1.8 mmol/L (10-29-21 @ 12:44)                                                                                                       Mode: Auto Mode: PRVC/ Volume Support  RR (machine): 12  TV (machine): 400  FiO2: 30  PEEP: 5  MAP: 8  PIP: 14                                      ABG - ( 01 Nov 2021 03:26 )  pH, Arterial: 7.45  pH, Blood: x     /  pCO2: 46    /  pO2: 116   / HCO3: 32    / Base Excess: 6.9   /  SaO2: 99.6                MEDICATIONS  (STANDING):  chlorhexidine 0.12% Liquid 15 milliLiter(s) Oral Mucosa every 12 hours  chlorhexidine 4% Liquid 1 Application(s) Topical <User Schedule>  dextrose 5%. 1000 milliLiter(s) (50 mL/Hr) IV Continuous <Continuous>  enoxaparin Injectable 40 milliGRAM(s) SubCutaneous daily  famotidine Injectable 20 milliGRAM(s) IV Push every 12 hours  lactated ringers. 1000 milliLiter(s) (100 mL/Hr) IV Continuous <Continuous>  midazolam Infusion 0.02 mG/kG/Hr (1.04 mL/Hr) IV Continuous <Continuous>  propofol Infusion 1 MICROgram(s)/kG/Min (0.31 mL/Hr) IV Continuous <Continuous>    MEDICATIONS  (PRN):  LORazepam   Injectable 2.5 milliGRAM(s) IV Push once PRN Seizure activity          Xrays:  TLC:  OG:  ET tube:                                                                                    no infiltrate    ECHO:  CAM ICU:           Patient is a 25y old  Female who presents with a chief complaint of Drug overdose (01 Nov 2021 08:22)      Over Night Events:  Patient seen and examined.   still vented on sedation propofol   on VEEG   on LR     ROS:  See HPI    PHYSICAL EXAM    ICU Vital Signs Last 24 Hrs  T(C): 36.6 (01 Nov 2021 07:01), Max: 37.2 (31 Oct 2021 16:00)  T(F): 97.8 (01 Nov 2021 07:01), Max: 99 (31 Oct 2021 23:01)  HR: 76 (01 Nov 2021 07:00) (71 - 115)  BP: 120/74 (01 Nov 2021 07:00) (114/70 - 137/85)  BP(mean): 91 (01 Nov 2021 07:00) (84 - 108)  ABP: --  ABP(mean): --  RR: 31 (01 Nov 2021 07:01) (12 - 36)  SpO2: 100% (01 Nov 2021 07:00) (99% - 100%)      General: on sedation   HEENT:   + et tube             Lymph Nodes: NO cervical LN   Lungs: Bilateral BS  Cardiovascular: Regular   Abdomen: Soft, Positive BS  Extremities: No clubbing   Skin: warm   Neurological: positive gag   Musculoskeletal: move all ext     I&O's Detail    31 Oct 2021 07:01  -  01 Nov 2021 07:00  --------------------------------------------------------  IN:    dextrose 5%: 1050 mL    Enteral Tube Flush: 130 mL    IV PiggyBack: 100 mL    Jevity 1.2: 630 mL    Lactated Ringers: 2100 mL    Midazolam: 118 mL    Propofol: 442 mL  Total IN: 4570 mL    OUT:    Indwelling Catheter - Urethral (mL): 3845 mL    Rectal Tube (mL): 50 mL  Total OUT: 3895 mL    Total NET: 675 mL      01 Nov 2021 07:01  -  01 Nov 2021 08:31  --------------------------------------------------------  IN:  Total IN: 0 mL    OUT:    Indwelling Catheter - Urethral (mL): 750 mL  Total OUT: 750 mL    Total NET: -750 mL          LABS:                          11.5   10.81 )-----------( 194      ( 01 Nov 2021 05:32 )             35.3         01 Nov 2021 05:32    141    |  104    |  4      ----------------------------<  107    4.1     |  29     |  <0.5     Ca    8.4        01 Nov 2021 05:32  Mg     1.9       01 Nov 2021 05:32    TPro  5.5    /  Alb  3.3    /  TBili  <0.2   /  DBili  x      /  AST  16     /  ALT  9      /  AlkPhos  73     01 Nov 2021 05:32  Amylase x     lipase x                                                                                            Lactate, Blood: 1.3 mmol/L (10-30-21 @ 05:36)  Lactate, Blood: 1.3 mmol/L (10-29-21 @ 21:33)  Lactate, Blood: 1.2 mmol/L (10-29-21 @ 15:00)  Lactate, Blood: 1.8 mmol/L (10-29-21 @ 12:44)                                                                                                       Mode: Auto Mode: PRVC/ Volume Support  RR (machine): 12  TV (machine): 400  FiO2: 30  PEEP: 5  MAP: 8  PIP: 14                                      ABG - ( 01 Nov 2021 03:26 )  pH, Arterial: 7.45  pH, Blood: x     /  pCO2: 46    /  pO2: 116   / HCO3: 32    / Base Excess: 6.9   /  SaO2: 99.6                MEDICATIONS  (STANDING):  chlorhexidine 0.12% Liquid 15 milliLiter(s) Oral Mucosa every 12 hours  chlorhexidine 4% Liquid 1 Application(s) Topical <User Schedule>  dextrose 5%. 1000 milliLiter(s) (50 mL/Hr) IV Continuous <Continuous>  enoxaparin Injectable 40 milliGRAM(s) SubCutaneous daily  famotidine Injectable 20 milliGRAM(s) IV Push every 12 hours  lactated ringers. 1000 milliLiter(s) (100 mL/Hr) IV Continuous <Continuous>  midazolam Infusion 0.02 mG/kG/Hr (1.04 mL/Hr) IV Continuous <Continuous>  propofol Infusion 1 MICROgram(s)/kG/Min (0.31 mL/Hr) IV Continuous <Continuous>    MEDICATIONS  (PRN):  LORazepam   Injectable 2.5 milliGRAM(s) IV Push once PRN Seizure activity          Xrays:  TLC:  OG:  ET tube:                                                                                    no infiltrate    ECHO:  CAM ICU:

## 2021-11-01 NOTE — PROGRESS NOTE ADULT - SUBJECTIVE AND OBJECTIVE BOX
25 year old F hx of borderline personality disorder, anxiety, depression, substance abuse, prior SI by ingestion, presents to ED with ingestions. Patient reported taking Venlafaxine 150mg x 20 tab and bupropion 100mg x 20 tabs at ~3pm. Denies other co-ingestions. In the ED, the patient is asymptomatic, with normal vitals and ekg, baseline mental status, afebrile, no hyperreflexia or clonus. Patient intubated in the ICU as per Toxicology, had hyperreflexia and some myoclonus and was started on Cyproheptadine.    # Drug Overdose with Venlafaxine and Bupropion  # Serotonin Syndrome  # Suicide Attempt.    Patient was noted to develop acute bradycardia.    ICU Vital Signs Last 24 Hrs  T(C): 35.8 (01 Nov 2021 19:00), Max: 37.3 (01 Nov 2021 09:49)  T(F): 96.4 (01 Nov 2021 19:00), Max: 99.1 (01 Nov 2021 09:49)  HR: 52 (01 Nov 2021 19:33) (52 - 115)  BP: 126/80 (01 Nov 2021 19:00) (97/55 - 154/93)  BP(mean): 98 (01 Nov 2021 19:00) (68 - 119)  RR: 12 (01 Nov 2021 18:00) (12 - 30)  SpO2: 100% (01 Nov 2021 19:33) (99% - 100%)    ABG - ( 01 Nov 2021 03:26 )  pH, Arterial: 7.45  pH, Blood: x     /  pCO2: 46    /  pO2: 116   / HCO3: 32    / Base Excess: 6.9   /  SaO2: 99.6                            11.5   10.81 )-----------( 194      ( 01 Nov 2021 05:32 )             35.3       141  |  104  |  4<L>  ----------------------------<  107<H>  4.1   |  29  |  <0.5<L>    Ca    8.4<L>      01 Nov 2021 05:32  Mg     1.9     11-01    TPro  5.5<L>  /  Alb  3.3<L>  /  TBili  <0.2  /  DBili  x   /  AST  16  /  ALT  9   /  AlkPhos  73  11-01    LIVER FUNCTIONS - ( 01 Nov 2021 05:32 )  Alb: 3.3 g/dL / Pro: 5.5 g/dL / ALK PHOS: 73 U/L / ALT: 9 U/L / AST: 16 U/L / GGT: x             Discussion with the bedside team concluded that the bradycardia developed when the Precedex was added and increased since 7 pm.    IMP-  Bradycardia    Plan-  Taper down Precedex and DC if no resolution and use another form of sedation.  Monitor

## 2021-11-01 NOTE — PROGRESS NOTE ADULT - ATTENDING COMMENTS
ICU Vital Signs Last 24 Hrs  T(C): 35.8 (01 Nov 2021 15:00), Max: 37.3 (01 Nov 2021 09:49)  T(F): 96.5 (01 Nov 2021 15:00), Max: 99.1 (01 Nov 2021 09:49)  HR: 69 (01 Nov 2021 16:00) (69 - 115)  BP: 101/63 (01 Nov 2021 16:00) (97/55 - 154/93)  BP(mean): 77 (01 Nov 2021 16:00) (68 - 119)  ABP: --  ABP(mean): --  RR: 12 (01 Nov 2021 16:00) (12 - 30)  SpO2: 99% (01 Nov 2021 16:00) (99% - 100%)    #OD - suicide attempt  #serotonin syndrome   #hx of substance abuse  #seizures  #hypocalcemia - replete and recheck   #AHRF - air way protection - became very agitated with SBT today - placed back on sedation  not ready to be weaned off vent yet    discussed with team

## 2021-11-01 NOTE — PROGRESS NOTE ADULT - SUBJECTIVE AND OBJECTIVE BOX
24H events:    Patient is a 25y old Female who presents with a chief complaint of Drug overdose (31 Oct 2021 12:41)    Primary diagnosis of Drug overdose       Today is hospital day 4d.     PAST MEDICAL & SURGICAL HISTORY  H/O suicide attempt    Anxiety and depression    No significant past surgical history        ALLERGIES:  No Known Allergies    MEDICATIONS:  STANDING MEDICATIONS  chlorhexidine 0.12% Liquid 15 milliLiter(s) Oral Mucosa every 12 hours  chlorhexidine 4% Liquid 1 Application(s) Topical <User Schedule>  dextrose 5%. 1000 milliLiter(s) IV Continuous <Continuous>  enoxaparin Injectable 40 milliGRAM(s) SubCutaneous daily  famotidine Injectable 20 milliGRAM(s) IV Push every 12 hours  lactated ringers. 1000 milliLiter(s) IV Continuous <Continuous>  midazolam Infusion 0.02 mG/kG/Hr IV Continuous <Continuous>  propofol Infusion 1 MICROgram(s)/kG/Min IV Continuous <Continuous>    PRN MEDICATIONS  LORazepam   Injectable 2.5 milliGRAM(s) IV Push once PRN    VITALS:   T(F): 97.8  HR: 76  BP: 120/74  RR: 31  SpO2: 100%    LABS:                        11.5   10.81 )-----------( 194      ( 01 Nov 2021 05:32 )             35.3     11-01    141  |  104  |  4<L>  ----------------------------<  107<H>  4.1   |  29  |  <0.5<L>    Ca    8.4<L>      01 Nov 2021 05:32  Mg     1.9     11-01    TPro  5.5<L>  /  Alb  3.3<L>  /  TBili  <0.2  /  DBili  x   /  AST  16  /  ALT  9   /  AlkPhos  73  11-01        ABG - ( 01 Nov 2021 03:26 )  pH, Arterial: 7.45  pH, Blood: x     /  pCO2: 46    /  pO2: 116   / HCO3: 32    / Base Excess: 6.9   /  SaO2: 99.6                PHYSICAL EXAM:  GENERAL: NAD, intubated  HEAD:  Atraumatic, Normocephalic  NECK: Supple, No JVD  NERVOUS SYSTEM:  Sedated, SAT and SBT today   CHEST/LUNG: Clear to auscultation bilaterally; No rales, rhonchi, wheezing, or rubs  HEART: Regular rate and rhythm; No murmurs, rubs, or gallops  ABDOMEN: Soft, Nontender, Nondistended; Bowel sounds present  EXTREMITIES: No edema

## 2021-11-01 NOTE — PROGRESS NOTE ADULT - ASSESSMENT
IMPRESSION:      PLAN:    CNS:    HEENT:    PULMONARY:    CARDIOVASCULAR:    GI: GI prophylaxis.  Feeding     RENAL:    INFECTIOUS DISEASE:    HEMATOLOGICAL:  DVT prophylaxis.    ENDOCRINE:  Follow up FS.  Insulin protocol if needed.    MUSCULOSKELETAL:   IMPRESSION:    Overdose  acute hypoxemic respiratory failure  seizure  Serotonin syndrome  Suicidal Iseation    PLAN:    CNS: SAT. Fu Neuro for VEEG results. Ct head per neuro. fu tox    HEENT: oral care, et tube care    PULMONARY: repeat CXR. no changes on vent    CARDIOVASCULAR: keep i=o. dc IVF.     GI: GI prophylaxis. c/w Feeding per OGT.  dc feeds during SAT.    RENAL: fu lytes.    INFECTIOUS DISEASE: off antibx    HEMATOLOGICAL:  DVT prophylaxis.    ENDOCRINE:  Follow up FS q4h.  Insulin protocol if needed.     MICU monitoring

## 2021-11-01 NOTE — PROGRESS NOTE ADULT - ATTENDING COMMENTS
patient seen and examined agree above note   follow neurology   start precedex and d/c versed if possible   can add fentanyl of needed

## 2021-11-01 NOTE — PROGRESS NOTE ADULT - ATTENDING COMMENTS
Patient examined with mother at bedside.  She did not follow commands.  She had sluggish oculocephalics, reactive pupils.  Did not withdraw or posture to pain but had just received increased sedation and extremity movements noted by nurse earlier.  Spontaneous breathing trial pending.  Will continue vEEG monitoring overnight.  Would like to record off sedation.

## 2021-11-02 DIAGNOSIS — F33.2 MAJOR DEPRESSIVE DISORDER, RECURRENT SEVERE WITHOUT PSYCHOTIC FEATURES: ICD-10-CM

## 2021-11-02 DIAGNOSIS — F41.1 GENERALIZED ANXIETY DISORDER: ICD-10-CM

## 2021-11-02 DIAGNOSIS — F12.20 CANNABIS DEPENDENCE, UNCOMPLICATED: ICD-10-CM

## 2021-11-02 LAB
ALBUMIN SERPL ELPH-MCNC: 3 G/DL — LOW (ref 3.5–5.2)
ALP SERPL-CCNC: 70 U/L — SIGNIFICANT CHANGE UP (ref 30–115)
ALT FLD-CCNC: 8 U/L — SIGNIFICANT CHANGE UP (ref 0–41)
ANION GAP SERPL CALC-SCNC: 10 MMOL/L — SIGNIFICANT CHANGE UP (ref 7–14)
AST SERPL-CCNC: 12 U/L — SIGNIFICANT CHANGE UP (ref 0–41)
BILIRUB SERPL-MCNC: <0.2 MG/DL — SIGNIFICANT CHANGE UP (ref 0.2–1.2)
BUN SERPL-MCNC: 9 MG/DL — LOW (ref 10–20)
CALCIUM SERPL-MCNC: 8.6 MG/DL — SIGNIFICANT CHANGE UP (ref 8.5–10.1)
CHLORIDE SERPL-SCNC: 105 MMOL/L — SIGNIFICANT CHANGE UP (ref 98–110)
CO2 SERPL-SCNC: 25 MMOL/L — SIGNIFICANT CHANGE UP (ref 17–32)
CREAT SERPL-MCNC: <0.5 MG/DL — LOW (ref 0.7–1.5)
GLUCOSE BLDC GLUCOMTR-MCNC: 103 MG/DL — HIGH (ref 70–99)
GLUCOSE BLDC GLUCOMTR-MCNC: 109 MG/DL — HIGH (ref 70–99)
GLUCOSE BLDC GLUCOMTR-MCNC: 110 MG/DL — HIGH (ref 70–99)
GLUCOSE BLDC GLUCOMTR-MCNC: 121 MG/DL — HIGH (ref 70–99)
GLUCOSE BLDC GLUCOMTR-MCNC: 93 MG/DL — SIGNIFICANT CHANGE UP (ref 70–99)
GLUCOSE SERPL-MCNC: 133 MG/DL — HIGH (ref 70–99)
HCT VFR BLD CALC: 31.4 % — LOW (ref 37–47)
HGB BLD-MCNC: 10.2 G/DL — LOW (ref 12–16)
MCHC RBC-ENTMCNC: 30.4 PG — SIGNIFICANT CHANGE UP (ref 27–31)
MCHC RBC-ENTMCNC: 32.5 G/DL — SIGNIFICANT CHANGE UP (ref 32–37)
MCV RBC AUTO: 93.5 FL — SIGNIFICANT CHANGE UP (ref 81–99)
NRBC # BLD: 0 /100 WBCS — SIGNIFICANT CHANGE UP (ref 0–0)
PLATELET # BLD AUTO: 218 K/UL — SIGNIFICANT CHANGE UP (ref 130–400)
POTASSIUM SERPL-MCNC: 3.8 MMOL/L — SIGNIFICANT CHANGE UP (ref 3.5–5)
POTASSIUM SERPL-SCNC: 3.8 MMOL/L — SIGNIFICANT CHANGE UP (ref 3.5–5)
PROT SERPL-MCNC: 5.4 G/DL — LOW (ref 6–8)
RBC # BLD: 3.36 M/UL — LOW (ref 4.2–5.4)
RBC # FLD: 12.4 % — SIGNIFICANT CHANGE UP (ref 11.5–14.5)
SODIUM SERPL-SCNC: 140 MMOL/L — SIGNIFICANT CHANGE UP (ref 135–146)
WBC # BLD: 7.09 K/UL — SIGNIFICANT CHANGE UP (ref 4.8–10.8)
WBC # FLD AUTO: 7.09 K/UL — SIGNIFICANT CHANGE UP (ref 4.8–10.8)

## 2021-11-02 PROCEDURE — 99232 SBSQ HOSP IP/OBS MODERATE 35: CPT

## 2021-11-02 PROCEDURE — 99232 SBSQ HOSP IP/OBS MODERATE 35: CPT | Mod: GC

## 2021-11-02 PROCEDURE — 99222 1ST HOSP IP/OBS MODERATE 55: CPT

## 2021-11-02 PROCEDURE — 99233 SBSQ HOSP IP/OBS HIGH 50: CPT

## 2021-11-02 PROCEDURE — 71045 X-RAY EXAM CHEST 1 VIEW: CPT | Mod: 26

## 2021-11-02 PROCEDURE — 95720 EEG PHY/QHP EA INCR W/VEEG: CPT

## 2021-11-02 RX ADMIN — FAMOTIDINE 20 MILLIGRAM(S): 10 INJECTION INTRAVENOUS at 05:51

## 2021-11-02 RX ADMIN — CHLORHEXIDINE GLUCONATE 1 APPLICATION(S): 213 SOLUTION TOPICAL at 05:32

## 2021-11-02 RX ADMIN — DEXMEDETOMIDINE HYDROCHLORIDE IN 0.9% SODIUM CHLORIDE 0.65 MICROGRAM(S)/KG/HR: 4 INJECTION INTRAVENOUS at 20:16

## 2021-11-02 RX ADMIN — PROPOFOL 0.31 MICROGRAM(S)/KG/MIN: 10 INJECTION, EMULSION INTRAVENOUS at 05:51

## 2021-11-02 RX ADMIN — CHLORHEXIDINE GLUCONATE 15 MILLILITER(S): 213 SOLUTION TOPICAL at 05:32

## 2021-11-02 RX ADMIN — FAMOTIDINE 20 MILLIGRAM(S): 10 INJECTION INTRAVENOUS at 18:03

## 2021-11-02 RX ADMIN — ENOXAPARIN SODIUM 40 MILLIGRAM(S): 100 INJECTION SUBCUTANEOUS at 12:43

## 2021-11-02 RX ADMIN — DEXMEDETOMIDINE HYDROCHLORIDE IN 0.9% SODIUM CHLORIDE 0.65 MICROGRAM(S)/KG/HR: 4 INJECTION INTRAVENOUS at 05:51

## 2021-11-02 NOTE — BEHAVIORAL HEALTH ASSESSMENT NOTE - DIFFERENTIAL
MDD, recurrent severe  ISACC  Cannabis use disorder  R/o PTSD  Borderline Personality Disorder by hx

## 2021-11-02 NOTE — PROGRESS NOTE ADULT - ASSESSMENT
IMPRESSION:    Overdose  acute hypoxemic respiratory failure  seizure  Serotonin syndrome  Suicidal Iseation    PLAN:    CNS: SAT. Fu Neuro for VEEG results. Ct head per neuro. fu tox    HEENT: oral care, et tube care    PULMONARY: repeat CXR. no changes on vent    CARDIOVASCULAR: keep i=o. .     GI: GI prophylaxis. c/w Feeding per OGT.  dc feeds during SAT.    RENAL: fu lytes.    INFECTIOUS DISEASE: off antibx    HEMATOLOGICAL:  DVT prophylaxis.    ENDOCRINE:  Follow up FS q4h.  Insulin protocol if needed.     MICU monitoring   IMPRESSION:    Overdose  acute hypoxemic respiratory failure  seizure  Serotonin syndrome  Suicidal Iseation    PLAN:    CNS: SAT.  now awake on precedex off propofol Fu Neuro for VEEG results. Ct head per neuro. fu tox    HEENT: oral care, et tube care    PULMONARY: repeat CXR.  do weaning trial     CARDIOVASCULAR: keep i=o. .     GI: GI prophylaxis. c/w Feeding per OGT.  dc feeds during SAT.    RENAL: fu lytes.    INFECTIOUS DISEASE: off antibx    HEMATOLOGICAL:  DVT prophylaxis.    ENDOCRINE:  Follow up FS q4h.  Insulin protocol if needed.     MICU monitoring

## 2021-11-02 NOTE — BEHAVIORAL HEALTH ASSESSMENT NOTE - NSBHCHARTREVIEWIMAGING_PSY_A_CORE FT
EXAM:  XR CHEST PORTABLE URGENT 1V        PROCEDURE DATE:  11/02/2021    INTERPRETATION:  Clinical History / Reason for exam: Shortness of breath.  Comparison : Chest radiograph 11/1/2021  Technique/Positioning: Single AP chest radiograph.  Findings:  Support devices: Endotracheal tube is at the level of the clavicles, enteric tube terminates in the left upper quadrant. Telemetry leads overlie the thorax.  Cardiac/mediastinum/hilum: Unremarkable.  Lung parenchyma/Pleura:No focal consolidation, effusion or pneumothorax.  Skeleton/soft tissues: Large stomach bubble. Possible pneumomediastinum.  Impression:  No focal consolidation. Large stomach bubble. Possible pneumomediastinum versus artifact.      CARMEN HERRON MD; Attending Radiologist  This document has been electronically signed. Nov 2 2021 10:29AM

## 2021-11-02 NOTE — PROGRESS NOTE ADULT - ASSESSMENT
25 year old F hx of borderline personality disorder, anxiety, depression, substance abuse, prior SI by ingestion, presents to ED with ingestions. Patient reported taking Venlafaxine 150mg x 20 tab and bupropion 100mg x 20 tabs at ~3pm. Denies other co-ingestions. In the ED, the patient is asymptomatic, with normal vitals and ekg, baseline mental status, afebrile, no hyperreflexia or clonus. Patient intubated in the ICU as per Toxicology, had hyperreflexia and some myoclonus and was started on Cyproheptadine.    # Drug Overdose with Venlafaxine and Bupropion  # Serotonin Syndrome  # Suicide Attempt  # H/O Suicide Attempt  # Borderline personality disorder  # Anxiety/depression  # H/O Substance Abuse  - Took 150x20 of Venlafaxine, Bupropion 100 x 20  - s/p Activated charcoal given 50grams via OGT as per toxicology  - s/p intubation by anesthesia 10/29/2021 and extubation on 11/02/2021  - s/p Polyethylene Glycol soln given as per toxicology  - Golytely q1h until rectal effluent clear or total of 10L effluent is excreted  - No evidence of serotonin syndrome on exam. Continue to hold Cyproheptadine   - Psych consult, Keep on 1:1     # Seizures  - Likely from overdose  - Ativan PRN  - EEG was done, did not show any epileptiform activity     # Hypocalcemia  - Positive Trousseau + Episode of seizure  - Will supplement with IV Calcium Gluconate 2g  - Calcium levels WNL      DVT: Lovenox  GI: Protonix  Dispo: Acute

## 2021-11-02 NOTE — PROGRESS NOTE ADULT - SUBJECTIVE AND OBJECTIVE BOX
Patient is a 25y old  Female who presents with a chief complaint of Drug overdose (01 Nov 2021 22:31)      Over Night Events:  Patient seen and examined.   still vented on sedation and Veeg     ROS:  See HPI    PHYSICAL EXAM    ICU Vital Signs Last 24 Hrs  T(C): 35.8 (02 Nov 2021 07:10), Max: 37.3 (01 Nov 2021 09:49)  T(F): 96.5 (02 Nov 2021 07:10), Max: 99.1 (01 Nov 2021 09:49)  HR: 77 (02 Nov 2021 07:30) (46 - 97)  BP: 113/69 (02 Nov 2021 07:15) (97/55 - 143/99)  BP(mean): 84 (02 Nov 2021 07:15) (68 - 116)  ABP: --  ABP(mean): --  RR: 14 (02 Nov 2021 07:15) (12 - 30)  SpO2: 100% (02 Nov 2021 07:30) (99% - 100%)      General: on sedation   HEENT:  et tube               Lymph Nodes: NO cervical LN   Lungs: Bilateral BS  Cardiovascular: Regular   Abdomen: Soft, Positive BS  Extremities: No clubbing   Skin: warm   Neurological: positive gag   Musculoskeletal: move all ext     I&O's Detail    01 Nov 2021 07:01  -  02 Nov 2021 07:00  --------------------------------------------------------  IN:    Dexmedetomidine: 303 mL    dextrose 5%: 100 mL    Jevity 1.2: 630 mL    Lactated Ringers: 200 mL    Midazolam: 39 mL    Propofol: 382 mL  Total IN: 1654 mL    OUT:    Indwelling Catheter - Urethral (mL): 2400 mL    Rectal Tube (mL): 100 mL  Total OUT: 2500 mL    Total NET: -846 mL      02 Nov 2021 07:01  -  02 Nov 2021 08:01  --------------------------------------------------------  IN:    Dexmedetomidine: 19.5 mL  Total IN: 19.5 mL    OUT:    Indwelling Catheter - Urethral (mL): 10 mL  Total OUT: 10 mL    Total NET: 9.5 mL          LABS:                          10.2   7.09  )-----------( 218      ( 02 Nov 2021 05:21 )             31.4         02 Nov 2021 05:21    140    |  105    |  9      ----------------------------<  133    3.8     |  25     |  <0.5     Ca    8.6        02 Nov 2021 05:21  Mg     1.9       01 Nov 2021 05:32    TPro  5.4    /  Alb  3.0    /  TBili  <0.2   /  DBili  x      /  AST  12     /  ALT  8      /  AlkPhos  70     02 Nov 2021 05:21  Amylase x     lipase x                                                                                                                                                                                                 Mode: Auto Mode: PRVC/ Volume Support  RR (machine): 12  TV (machine): 400  FiO2: 30  PEEP: 5  ITime: 1.67  MAP: 8  PIP: 14                                      ABG - ( 02 Nov 2021 04:06 )  pH, Arterial: 7.44  pH, Blood: x     /  pCO2: 44    /  pO2: 150   / HCO3: 30    / Base Excess: 5.1   /  SaO2: 99.4                MEDICATIONS  (STANDING):  chlorhexidine 0.12% Liquid 15 milliLiter(s) Oral Mucosa every 12 hours  chlorhexidine 4% Liquid 1 Application(s) Topical <User Schedule>  dexMEDEtomidine Infusion 0.05 MICROgram(s)/kG/Hr (0.65 mL/Hr) IV Continuous <Continuous>  enoxaparin Injectable 40 milliGRAM(s) SubCutaneous daily  famotidine Injectable 20 milliGRAM(s) IV Push every 12 hours  propofol Infusion 1 MICROgram(s)/kG/Min (0.31 mL/Hr) IV Continuous <Continuous>    MEDICATIONS  (PRN):  LORazepam   Injectable 2.5 milliGRAM(s) IV Push once PRN Seizure activity          Xrays:  TLC:  OG:  ET tube:                                                                                       ECHO:  CAM ICU:

## 2021-11-02 NOTE — BEHAVIORAL HEALTH ASSESSMENT NOTE - PATIENT'S CHIEF COMPLAINT
"Chief Complaint   Patient presents with     Physical     New Patient       Initial /70 mmHg  Pulse 83  Temp(Src) 97.5  F (36.4  C) (Oral)  Resp 12  Ht 6' 1.5\" (1.867 m)  Wt 287 lb (130.182 kg)  BMI 37.35 kg/m2  SpO2 94% Estimated body mass index is 37.35 kg/(m^2) as calculated from the following:    Height as of this encounter: 6' 1.5\" (1.867 m).    Weight as of this encounter: 287 lb (130.182 kg).  BP completed using cuff size: large  Injectable Influenza Immunization Documentation      1.  Has the patient received the information for the injectable influenza vaccine? YES    2. Is the patient 6 months of age or older? YES    3. Does the patient have any of the following contraindications?          Severe allergy to eggs? No     Severe allergic reaction to previous influenza vaccines? No     Allergy to contact lens solution/thimerosol? No     History of Guillain-Anatone syndrome? No     Undergoing chemotherapy or radiation therapy?       (vaccine should be given at least 2 weeks prior or 3 weeks after)  No     Currently have moderate or severe illness? No         4.  The vaccine has been administered and the patient was instructed to wait 15 minutes before leaving the building in the event of an allergic reaction: YES    Vaccination given by Israel SALAZAR        "
n/a
"It was a mistake."

## 2021-11-02 NOTE — PROGRESS NOTE ADULT - ATTENDING COMMENTS
ICU Vital Signs Last 24 Hrs  T(C): 37.1 (02 Nov 2021 15:00), Max: 37.3 (02 Nov 2021 10:55)  T(F): 98.7 (02 Nov 2021 15:00), Max: 99.1 (02 Nov 2021 10:55)  HR: 89 (02 Nov 2021 16:00) (46 - 121)  BP: 118/74 (02 Nov 2021 16:00) (100/56 - 142/91)  BP(mean): 90 (02 Nov 2021 16:00) (70 - 110)  ABP: --  ABP(mean): --  RR: 22 (02 Nov 2021 16:00) (12 - 28)  SpO2: 100% (02 Nov 2021 16:00) (99% - 100%)    sp extubation today  appears well  very anxious  s&s  psych follow up  1:1 sit  cont to monitor in icu   discussed with team ICU Vital Signs Last 24 Hrs  T(C): 37.1 (02 Nov 2021 15:00), Max: 37.3 (02 Nov 2021 10:55)  T(F): 98.7 (02 Nov 2021 15:00), Max: 99.1 (02 Nov 2021 10:55)  HR: 89 (02 Nov 2021 16:00) (46 - 121)  BP: 118/74 (02 Nov 2021 16:00) (100/56 - 142/91)  BP(mean): 90 (02 Nov 2021 16:00) (70 - 110)  ABP: --  ABP(mean): --  RR: 22 (02 Nov 2021 16:00) (12 - 28)  SpO2: 100% (02 Nov 2021 16:00) (99% - 100%)    sp extubation today  appears well  very anxious  s&s  psych follow up  1:1 sit  neuro follow up =- VEEG  cont to monitor in icu   discussed with team

## 2021-11-02 NOTE — PROGRESS NOTE ADULT - SUBJECTIVE AND OBJECTIVE BOX
24H events:    Patient is a 25y old Female who presents with a chief complaint of Drug overdose (02 Nov 2021 08:01)    Primary diagnosis of Drug overdose       Today is hospital day 5d.     PAST MEDICAL & SURGICAL HISTORY  H/O suicide attempt    Anxiety and depression    No significant past surgical history      SOCIAL HISTORY:  Negative for smoking/alcohol/drug use.     ALLERGIES:  No Known Allergies    MEDICATIONS:  STANDING MEDICATIONS  chlorhexidine 0.12% Liquid 15 milliLiter(s) Oral Mucosa every 12 hours  chlorhexidine 4% Liquid 1 Application(s) Topical <User Schedule>  dexMEDEtomidine Infusion 0.05 MICROgram(s)/kG/Hr IV Continuous <Continuous>  enoxaparin Injectable 40 milliGRAM(s) SubCutaneous daily  famotidine Injectable 20 milliGRAM(s) IV Push every 12 hours  propofol Infusion 1 MICROgram(s)/kG/Min IV Continuous <Continuous>    PRN MEDICATIONS  LORazepam   Injectable 2.5 milliGRAM(s) IV Push once PRN    VITALS:   T(F): 99.1  HR: 114  BP: 142/91  RR: 25  SpO2: 100%    LABS:                        10.2   7.09  )-----------( 218      ( 02 Nov 2021 05:21 )             31.4     11-02    140  |  105  |  9<L>  ----------------------------<  133<H>  3.8   |  25  |  <0.5<L>    Ca    8.6      02 Nov 2021 05:21  Mg     1.9     11-01    TPro  5.4<L>  /  Alb  3.0<L>  /  TBili  <0.2  /  DBili  x   /  AST  12  /  ALT  8   /  AlkPhos  70  11-02        ABG - ( 02 Nov 2021 10:08 )  pH, Arterial: 7.51  pH, Blood: x     /  pCO2: 38    /  pO2: 145   / HCO3: 30    / Base Excess: 6.8   /  SaO2: 99.5          PHYSICAL EXAM:  GENERAL: NAD  HEAD:  Atraumatic, Normocephalic  NECK: Supple, No JVD  NERVOUS SYSTEM:  A&O*3, follows command   CHEST/LUNG: Clear to auscultation bilaterally; No rales, rhonchi, wheezing, or rubs  HEART: Regular rate and rhythm; No murmurs, rubs, or gallops  ABDOMEN: Soft, Nontender, Nondistended; Bowel sounds present  EXTREMITIES: No edema

## 2021-11-02 NOTE — BEHAVIORAL HEALTH ASSESSMENT NOTE - NSBHCHARTREVIEWINVESTIGATE_PSY_A_CORE FT
Ventricular Rate 89 BPM  Atrial Rate 89 BPM  P-R Interval 128 ms  QRS Duration 74 ms  Q-T Interval 352 ms  QTC Calculation(Bazett) 428 ms  P Axis 80 degrees  R Axis 83 degrees  T Axis 57 degrees  Diagnosis Line Normal sinus rhythm  Normal ECG    Confirmed by EFRAIN GONZALEZ MD ([id number]) on 11/1/2021 12:03:54 PM

## 2021-11-02 NOTE — PROGRESS NOTE ADULT - SUBJECTIVE AND OBJECTIVE BOX
[id number]  Neurology Follow up note    Name  PREETHI NASH    HPI:  25 year old F hx of borderline personality disorder, anxiety, depression, substance abuse, prior SI by ingestion, presents to ED with ingestions. Patient reported taking Venlafaxine 150mg x 20 tab and bupropion 100mg x 20 tabs at ~3pm.   (28 Oct 2021 21:56)      Interval History - Pt seen at bedside with Dr. Calle, mother at bedside.  Pt recently extubated x 40 minutes on venti mask, on precedex weaning off.   Patient is awake and alert at present time. Pt states she feels anxious, no complaints of h/a, dizziness or new medical complaints.    Vital Signs Last 24 Hrs  T(C): 37.1 (02 Nov 2021 15:00), Max: 37.3 (02 Nov 2021 10:55)  T(F): 98.7 (02 Nov 2021 15:00), Max: 99.1 (02 Nov 2021 10:55)  HR: 99 (02 Nov 2021 15:00) (46 - 121)  BP: 125/77 (02 Nov 2021 15:00) (100/56 - 142/91)  BP(mean): 96 (02 Nov 2021 15:00) (70 - 110)  RR: 20 (02 Nov 2021 15:00) (12 - 28)  SpO2: 100% (02 Nov 2021 15:00) (99% - 100%)  ICU Vital Signs Last 24 Hrs  T(C): 37.1 (02 Nov 2021 15:00), Max: 37.3 (02 Nov 2021 10:55)  T(F): 98.7 (02 Nov 2021 15:00), Max: 99.1 (02 Nov 2021 10:55)  HR: 99 (02 Nov 2021 15:00) (46 - 121)  BP: 125/77 (02 Nov 2021 15:00) (100/56 - 142/91)  BP(mean): 96 (02 Nov 2021 15:00) (70 - 110)  ABP: --  ABP(mean): --  RR: 20 (02 Nov 2021 15:00) (12 - 28)  SpO2: 100% (02 Nov 2021 15:00) (99% - 100%)          Neurological Exam:     Mental status: Awake, alert and Oriented to time/place/person; Good eye contact ; follow simple commands.  Recent and remote memory intact.  Naming, repetition and comprehension intact.    Cranial nerves:  pupils equally round and reactive to light, visual fields full, no nystagmus, face symmetric, hearing intact to finger rub,sternocleidomastoid/shoulder shrug strength bilaterally 5/5.    Motor:  Normal bulk and tone, strength 5/5 in bilateral upper and lower extremities.   strength 5/5.   Sensation: Intact to light touch,  No neglect.  + clonus b/l l/e.   Coordination: No dysmetria on finger-to-nose and heel-to-shin.  No clumsiness.  Reflexes: 2+ in upper and lower extremities, downgoing toes bilaterally  Gait: deferred      Medications  chlorhexidine 0.12% Liquid 15 milliLiter(s) Oral Mucosa every 12 hours  chlorhexidine 4% Liquid 1 Application(s) Topical <User Schedule>  dexMEDEtomidine Infusion 0.05 MICROgram(s)/kG/Hr IV Continuous <Continuous>  enoxaparin Injectable 40 milliGRAM(s) SubCutaneous daily  famotidine Injectable 20 milliGRAM(s) IV Push every 12 hours  LORazepam   Injectable 2.5 milliGRAM(s) IV Push once PRN      Lab                        10.2   7.09  )-----------( 218      ( 02 Nov 2021 05:21 )             31.4     11-02    140  |  105  |  9<L>  ----------------------------<  133<H>  3.8   |  25  |  <0.5<L>    Ca    8.6      02 Nov 2021 05:21  Mg     1.9     11-01    TPro  5.4<L>  /  Alb  3.0<L>  /  TBili  <0.2  /  DBili  x   /  AST  12  /  ALT  8   /  AlkPhos  70  11-02    CAPILLARY BLOOD GLUCOSE      POCT Blood Glucose.: 103 mg/dL (02 Nov 2021 10:07)  POCT Blood Glucose.: 109 mg/dL (02 Nov 2021 06:12)  POCT Blood Glucose.: 110 mg/dL (02 Nov 2021 03:04)  POCT Blood Glucose.: 130 mg/dL (01 Nov 2021 22:52)  POCT Blood Glucose.: 129 mg/dL (01 Nov 2021 17:57)    LIVER FUNCTIONS - ( 02 Nov 2021 05:21 )  Alb: 3.0 g/dL / Pro: 5.4 g/dL / ALK PHOS: 70 U/L / ALT: 8 U/L / AST: 12 U/L / GGT: x                   Assessment- This is a 25y year old Female presenting with drug overdose extubated and weaning off precedex sedation.  Witnessed Seizure x 1 in ED, intubated and s/p gastric flush. No seizures or epileptiform discharges on first 24 hours of VEEG.  Will continue to r/o seizures with VEEG off sedation.    Plan  1. VEEG   2. Continue medical management of patient   3. Obtain follow up CTH when stable  4. Neurology team will follow

## 2021-11-02 NOTE — BEHAVIORAL HEALTH ASSESSMENT NOTE - NSBHCHARTREVIEWLAB_PSY_A_CORE FT
CBC Full  -  ( 02 Nov 2021 05:21 )  WBC Count : 7.09 K/uL  RBC Count : 3.36 M/uL  Hemoglobin : 10.2 g/dL  Hematocrit : 31.4 %  Platelet Count - Automated : 218 K/uL  Mean Cell Volume : 93.5 fL  Mean Cell Hemoglobin : 30.4 pg  Mean Cell Hemoglobin Concentration : 32.5 g/dL      11-02    140  |  105  |  9<L>  ----------------------------<  133<H>  3.8   |  25  |  <0.5<L>    Ca    8.6      02 Nov 2021 05:21  Mg     1.9     11-01    TPro  5.4<L>  /  Alb  3.0<L>  /  TBili  <0.2  /  DBili  x   /  AST  12  /  ALT  8   /  AlkPhos  70  11-02
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
## 2021-11-02 NOTE — BEHAVIORAL HEALTH ASSESSMENT NOTE - SUICIDE RISK FACTORS
Agitation/Severe Anxiety/Panic/Cluster B Personality disorders or traits current/past/Recent onset of current/past psychiatric diagnosis/Family History of psychiatric diagnoses requiring hospitalization

## 2021-11-02 NOTE — BEHAVIORAL HEALTH ASSESSMENT NOTE - NSBHCHARTREVIEWVS_PSY_A_CORE FT
ICU Vital Signs Last 24 Hrs  T(C): 36.3 (02 Nov 2021 19:00), Max: 37.3 (02 Nov 2021 10:55)  T(F): 97.3 (02 Nov 2021 19:00), Max: 99.1 (02 Nov 2021 10:55)  HR: 82 (02 Nov 2021 20:00) (46 - 121)  BP: 132/80 (02 Nov 2021 20:00) (100/56 - 142/91)  BP(mean): 102 (02 Nov 2021 20:00) (70 - 110)  RR: 22 (02 Nov 2021 20:00) (12 - 28)  SpO2: 100% (02 Nov 2021 19:00) (99% - 100%)

## 2021-11-02 NOTE — EEG REPORT - NS EEG TEXT BOX
Epilepsy Attending Note:     PREETHI NASH    25y Female  MRN [id number]    Vital Signs Last 24 Hrs  T(C): 37.3 (02 Nov 2021 10:55), Max: 37.3 (02 Nov 2021 10:55)  T(F): 99.1 (02 Nov 2021 10:55), Max: 99.1 (02 Nov 2021 10:55)  HR: 116 (02 Nov 2021 09:15) (46 - 116)  BP: 114/69 (02 Nov 2021 08:00) (97/55 - 132/88)  BP(mean): 85 (02 Nov 2021 08:00) (68 - 105)  RR: 25 (02 Nov 2021 10:55) (12 - 28)  SpO2: 100% (02 Nov 2021 09:15) (99% - 100%)                          10.2   7.09  )-----------( 218      ( 02 Nov 2021 05:21 )             31.4       11-02    140  |  105  |  9<L>  ----------------------------<  133<H>  3.8   |  25  |  <0.5<L>    Ca    8.6      02 Nov 2021 05:21  Mg     1.9     11-01    TPro  5.4<L>  /  Alb  3.0<L>  /  TBili  <0.2  /  DBili  x   /  AST  12  /  ALT  8   /  AlkPhos  70  11-02      MEDICATIONS  (STANDING):  chlorhexidine 0.12% Liquid 15 milliLiter(s) Oral Mucosa every 12 hours  chlorhexidine 4% Liquid 1 Application(s) Topical <User Schedule>  dexMEDEtomidine Infusion 0.05 MICROgram(s)/kG/Hr (0.65 mL/Hr) IV Continuous <Continuous>  enoxaparin Injectable 40 milliGRAM(s) SubCutaneous daily  famotidine Injectable 20 milliGRAM(s) IV Push every 12 hours  propofol Infusion 1 MICROgram(s)/kG/Min (0.31 mL/Hr) IV Continuous <Continuous>    MEDICATIONS  (PRN):  LORazepam   Injectable 2.5 milliGRAM(s) IV Push once PRN Seizure activity            VEEG in the last 24 hours: sedated and intubated    Background----------- symmetrical, mainly sedated sleep. It also shows periods of arousal that reaches frequencies in the range of 6-7 hz superimposed by diffusely expressed bursts of low theta and delta or bilateral posterior quadrant runs of theta    Focal and generalized slowing-----------  moderate generalized slowing    Interictal activity----------no clear epileptiform activity    Events----------------- none    Seizures----------  none    Impression:  abnormal as above    Plan - as/neurology team
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
## 2021-11-02 NOTE — BEHAVIORAL HEALTH ASSESSMENT NOTE - SUMMARY
Ms Edmonds is a 24 yo CF, domiciled with her adopted mother, brother, currently unemployed, with PPHx of MDD, Borderline Personality disorder, Anxiety, at least 2 IPP admissions, prior suicide attempts by OD, currently in treatment with Dr Cross and Ms Avril Stokes, [facility] and no significant medical hx who is admitted to the ICU after she OD on Bupropion, Effexor and Trileptal in a possible suicide attempt. Pt was sedated and intubated during the course of hospitalization as just got extubated today. Psychiatry has been consulted to evaluate the patient for suicidality.              On evaluation pt appears to struggle with depression and anxiety. Given her hx of chronic depression, recent suicide attempt, guarded nature of her she is at high acute risk for suicide and needs to be on constant observation. Pt unable to identify any specific triggers for her current suicide attempt. It appears that the patient was having anxiety impairing her ability to function normally which possibly contributed to low self esteem, dissatisfaction which could have led to the suicide attempt.              She will likely need IPP admission once she is medically stabilized. She just got extubated a few hours ago. Will hold off on psychiatric medications until she is more medically stable.                 PLAN:  - Continue patient on 1:1   - Hold off on scheduled psychiatric medications for now.   - For acute anxiety can consider using hydroxyzine 25 mg Q8 PRN.   - Psychiatry CL team will follow patient and make recommendations as necessary - Possible admission to IPP once medically stabilized.

## 2021-11-02 NOTE — PROGRESS NOTE ADULT - ATTENDING COMMENTS
Patient examined today just after extubation.  vEEG was recording and appeared normal while I was in the room.  Patient was alert and oriented and followed commands.  She was able to move all 4 extremities symmetrically.    Plan is to review vEEG from overnight in am and disconnect if no significant findings.  Patient will benefit from psychiatric f/u to assist in anxiety management and further assessment of condition leading up to OD.

## 2021-11-02 NOTE — BEHAVIORAL HEALTH ASSESSMENT NOTE - OTHER PAST PSYCHIATRIC HISTORY (INCLUDE DETAILS REGARDING ONSET, COURSE OF ILLNESS, INPATIENT/OUTPATIENT TREATMENT)
At least 4 suicide attempts. Previously by OD.   Has a hx of IPP admission at [facility] and also received ECT for depression 5-6 years ago.   Currently in Rx with Dr Cross for med management and with Ms Avril Stokes ([phone])  for therapy

## 2021-11-03 DIAGNOSIS — F12.20 CANNABIS DEPENDENCE, UNCOMPLICATED: ICD-10-CM

## 2021-11-03 LAB
ALBUMIN SERPL ELPH-MCNC: 3.3 G/DL — LOW (ref 3.5–5.2)
ALP SERPL-CCNC: 72 U/L — SIGNIFICANT CHANGE UP (ref 30–115)
ALT FLD-CCNC: 12 U/L — SIGNIFICANT CHANGE UP (ref 0–41)
ANION GAP SERPL CALC-SCNC: 14 MMOL/L — SIGNIFICANT CHANGE UP (ref 7–14)
AST SERPL-CCNC: 14 U/L — SIGNIFICANT CHANGE UP (ref 0–41)
BILIRUB SERPL-MCNC: 0.5 MG/DL — SIGNIFICANT CHANGE UP (ref 0.2–1.2)
BUN SERPL-MCNC: 11 MG/DL — SIGNIFICANT CHANGE UP (ref 10–20)
CALCIUM SERPL-MCNC: 8.7 MG/DL — SIGNIFICANT CHANGE UP (ref 8.5–10.1)
CHLORIDE SERPL-SCNC: 101 MMOL/L — SIGNIFICANT CHANGE UP (ref 98–110)
CO2 SERPL-SCNC: 24 MMOL/L — SIGNIFICANT CHANGE UP (ref 17–32)
CREAT SERPL-MCNC: 0.5 MG/DL — LOW (ref 0.7–1.5)
GLUCOSE BLDC GLUCOMTR-MCNC: 93 MG/DL — SIGNIFICANT CHANGE UP (ref 70–99)
GLUCOSE BLDC GLUCOMTR-MCNC: 95 MG/DL — SIGNIFICANT CHANGE UP (ref 70–99)
GLUCOSE SERPL-MCNC: 104 MG/DL — HIGH (ref 70–99)
HCT VFR BLD CALC: 32.6 % — LOW (ref 37–47)
HGB BLD-MCNC: 10.9 G/DL — LOW (ref 12–16)
MCHC RBC-ENTMCNC: 30.5 PG — SIGNIFICANT CHANGE UP (ref 27–31)
MCHC RBC-ENTMCNC: 33.4 G/DL — SIGNIFICANT CHANGE UP (ref 32–37)
MCV RBC AUTO: 91.3 FL — SIGNIFICANT CHANGE UP (ref 81–99)
NRBC # BLD: 0 /100 WBCS — SIGNIFICANT CHANGE UP (ref 0–0)
PLATELET # BLD AUTO: 232 K/UL — SIGNIFICANT CHANGE UP (ref 130–400)
POTASSIUM SERPL-MCNC: 3.6 MMOL/L — SIGNIFICANT CHANGE UP (ref 3.5–5)
POTASSIUM SERPL-SCNC: 3.6 MMOL/L — SIGNIFICANT CHANGE UP (ref 3.5–5)
PROT SERPL-MCNC: 5.7 G/DL — LOW (ref 6–8)
RBC # BLD: 3.57 M/UL — LOW (ref 4.2–5.4)
RBC # FLD: 11.9 % — SIGNIFICANT CHANGE UP (ref 11.5–14.5)
SODIUM SERPL-SCNC: 139 MMOL/L — SIGNIFICANT CHANGE UP (ref 135–146)
WBC # BLD: 5.67 K/UL — SIGNIFICANT CHANGE UP (ref 4.8–10.8)
WBC # FLD AUTO: 5.67 K/UL — SIGNIFICANT CHANGE UP (ref 4.8–10.8)

## 2021-11-03 PROCEDURE — 99233 SBSQ HOSP IP/OBS HIGH 50: CPT

## 2021-11-03 PROCEDURE — 95720 EEG PHY/QHP EA INCR W/VEEG: CPT

## 2021-11-03 PROCEDURE — 70450 CT HEAD/BRAIN W/O DYE: CPT | Mod: 26

## 2021-11-03 RX ORDER — HYDROXYZINE HCL 10 MG
25 TABLET ORAL EVERY 6 HOURS
Refills: 0 | Status: DISCONTINUED | OUTPATIENT
Start: 2021-11-03 | End: 2021-11-04

## 2021-11-03 RX ORDER — HYDROXYZINE HCL 10 MG
50 TABLET ORAL ONCE
Refills: 0 | Status: COMPLETED | OUTPATIENT
Start: 2021-11-03 | End: 2021-11-03

## 2021-11-03 RX ADMIN — Medication 50 MILLIGRAM(S): at 20:54

## 2021-11-03 RX ADMIN — DEXMEDETOMIDINE HYDROCHLORIDE IN 0.9% SODIUM CHLORIDE 0.65 MICROGRAM(S)/KG/HR: 4 INJECTION INTRAVENOUS at 19:30

## 2021-11-03 RX ADMIN — DEXMEDETOMIDINE HYDROCHLORIDE IN 0.9% SODIUM CHLORIDE 0.65 MICROGRAM(S)/KG/HR: 4 INJECTION INTRAVENOUS at 02:59

## 2021-11-03 RX ADMIN — ENOXAPARIN SODIUM 40 MILLIGRAM(S): 100 INJECTION SUBCUTANEOUS at 11:54

## 2021-11-03 RX ADMIN — CHLORHEXIDINE GLUCONATE 15 MILLILITER(S): 213 SOLUTION TOPICAL at 05:06

## 2021-11-03 RX ADMIN — FAMOTIDINE 20 MILLIGRAM(S): 10 INJECTION INTRAVENOUS at 05:07

## 2021-11-03 RX ADMIN — CHLORHEXIDINE GLUCONATE 1 APPLICATION(S): 213 SOLUTION TOPICAL at 05:06

## 2021-11-03 NOTE — SWALLOW BEDSIDE ASSESSMENT ADULT - SLP GENERAL OBSERVATIONS
in bed, on EEG, having ice chips and using incentive spirometer. Patient needs cues to use incentive spirometer correctly.

## 2021-11-03 NOTE — PROGRESS NOTE BEHAVIORAL HEALTH - NSBHCONSULTOBSREASON_PSY_A_CORE FT
patient made a serious suicide attempt , will need inpatient psychiatric hospitalization upon medical clearance

## 2021-11-03 NOTE — SWALLOW BEDSIDE ASSESSMENT ADULT - SLP PERTINENT HISTORY OF CURRENT PROBLEM
24 yo F with PMHx of MDD, Borderline Personality disorder, Anxiety, at least 2 IPP admissions, prior suicide attempts by OD, currently in treatment with Dr Cross and Ms Avril Stokes, who is admitted to the ICU after she OD on Bupropion, Effexor and Trileptal in a suicide attempt.

## 2021-11-03 NOTE — PHYSICAL THERAPY INITIAL EVALUATION ADULT - GAIT DISTANCE, PT EVAL
Order for pump, IV pole, feeding sets and Nepro formula faxed to [facility] at [phone]. TEJAL Forbes R.N.     3 feet ; limited by fatigue and anxiety/bed to chair

## 2021-11-03 NOTE — PROGRESS NOTE BEHAVIORAL HEALTH - NSBHCHARTREVIEWLAB_PSY_A_CORE FT
10.9   5.67  )-----------( 232      ( 03 Nov 2021 06:16 )             32.6       11-03    139  |  101  |  11  ----------------------------<  104<H>  3.6   |  24  |  0.5<L>    Ca    8.7      03 Nov 2021 06:16    TPro  5.7<L>  /  Alb  3.3<L>  /  TBili  0.5  /  DBili  x   /  AST  14  /  ALT  12  /  AlkPhos  72  11-03

## 2021-11-03 NOTE — PHYSICAL THERAPY INITIAL EVALUATION ADULT - GAIT DEVIATIONS NOTED, PT EVAL
shuffling steps , decreased heel strike / push off/decreased kavin/increased time in double stance/decreased step length/decreased weight-shifting ability

## 2021-11-03 NOTE — PROGRESS NOTE ADULT - SUBJECTIVE AND OBJECTIVE BOX
Patient is a 25y old  Female who presents with a chief complaint of Drug overdose (02 Nov 2021 12:15)      Over Night Events:  Patient seen and examined.   s/p extubation did well   awake     ROS:  See HPI    PHYSICAL EXAM    ICU Vital Signs Last 24 Hrs  T(C): 37 (03 Nov 2021 07:00), Max: 37.3 (02 Nov 2021 10:55)  T(F): 98.6 (03 Nov 2021 07:00), Max: 99.1 (02 Nov 2021 10:55)  HR: 74 (03 Nov 2021 07:00) (74 - 121)  BP: 126/83 (03 Nov 2021 07:00) (118/74 - 142/91)  BP(mean): 101 (03 Nov 2021 07:00) (90 - 110)  ABP: --  ABP(mean): --  RR: 22 (02 Nov 2021 20:00) (18 - 28)  SpO2: 100% (03 Nov 2021 07:00) (99% - 100%)      General: awake   HEENT:       rupinder         Lymph Nodes: NO cervical LN   Lungs: Bilateral BS  Cardiovascular: Regular   Abdomen: Soft, Positive BS  Extremities: No clubbing   Skin: warm   Neurological:  no focal deficit   Musculoskeletal: move all ext     I&O's Detail    02 Nov 2021 07:01  -  03 Nov 2021 07:00  --------------------------------------------------------  IN:    Dexmedetomidine: 464.5 mL  Total IN: 464.5 mL    OUT:    Indwelling Catheter - Urethral (mL): 1595 mL    Propofol: 0 mL    Rectal Tube (mL): 400 mL  Total OUT: 1995 mL    Total NET: -1530.5 mL          LABS:                          10.9   5.67  )-----------( 232      ( 03 Nov 2021 06:16 )             32.6         03 Nov 2021 06:16    139    |  101    |  11     ----------------------------<  104    3.6     |  24     |  0.5      Ca    8.7        03 Nov 2021 06:16    TPro  5.7    /  Alb  3.3    /  TBili  0.5    /  DBili  x      /  AST  14     /  ALT  12     /  AlkPhos  72     03 Nov 2021 06:16  Amylase x     lipase x                                                                                                                                                                                                 Mode: standby                                      ABG - ( 02 Nov 2021 10:08 )  pH, Arterial: 7.51  pH, Blood: x     /  pCO2: 38    /  pO2: 145   / HCO3: 30    / Base Excess: 6.8   /  SaO2: 99.5                MEDICATIONS  (STANDING):  chlorhexidine 0.12% Liquid 15 milliLiter(s) Oral Mucosa every 12 hours  chlorhexidine 4% Liquid 1 Application(s) Topical <User Schedule>  dexMEDEtomidine Infusion 0.05 MICROgram(s)/kG/Hr (0.65 mL/Hr) IV Continuous <Continuous>  enoxaparin Injectable 40 milliGRAM(s) SubCutaneous daily  famotidine Injectable 20 milliGRAM(s) IV Push every 12 hours    MEDICATIONS  (PRN):  LORazepam   Injectable 2.5 milliGRAM(s) IV Push once PRN Seizure activity          Xrays:  TLC:  OG:  ET tube:                                                                                       ECHO:  CAM ICU:

## 2021-11-03 NOTE — PROGRESS NOTE BEHAVIORAL HEALTH - NSBHCHARTREVIEWVS_PSY_A_CORE FT
Vital Signs Last 24 Hrs  T(C): 37.1 (03 Nov 2021 19:01), Max: 37.3 (03 Nov 2021 15:01)  T(F): 98.7 (03 Nov 2021 19:01), Max: 99.1 (03 Nov 2021 15:01)  HR: 78 (03 Nov 2021 22:16) (74 - 987)  BP: 123/77 (03 Nov 2021 22:16) (113/74 - 136/91)  BP(mean): 96 (03 Nov 2021 22:16) (90 - 109)  RR: 12 (03 Nov 2021 22:16) (12 - 20)  SpO2: 100% (03 Nov 2021 22:16) (99% - 100%)

## 2021-11-03 NOTE — PROGRESS NOTE ADULT - SUBJECTIVE AND OBJECTIVE BOX
24H events:    Patient is a 25y old Female who presents with a chief complaint of Drug overdose (03 Nov 2021 09:17)    Primary diagnosis of Drug overdose       Today is hospital day 6d.     PAST MEDICAL & SURGICAL HISTORY  H/O suicide attempt x4    Anxiety and depression    No significant past surgical history        ALLERGIES:  No Known Allergies    MEDICATIONS:  STANDING MEDICATIONS  chlorhexidine 0.12% Liquid 15 milliLiter(s) Oral Mucosa every 12 hours  chlorhexidine 4% Liquid 1 Application(s) Topical <User Schedule>  dexMEDEtomidine Infusion 0.05 MICROgram(s)/kG/Hr IV Continuous <Continuous>  enoxaparin Injectable 40 milliGRAM(s) SubCutaneous daily  famotidine Injectable 20 milliGRAM(s) IV Push every 12 hours    PRN MEDICATIONS  LORazepam   Injectable 2.5 milliGRAM(s) IV Push once PRN    VITALS:   T(F): 98.2  HR: 84  BP: 132/91  RR: 22  SpO2: 100%    LABS:                        10.9   5.67  )-----------( 232      ( 03 Nov 2021 06:16 )             32.6     11-03    139  |  101  |  11  ----------------------------<  104<H>  3.6   |  24  |  0.5<L>    Ca    8.7      03 Nov 2021 06:16    TPro  5.7<L>  /  Alb  3.3<L>  /  TBili  0.5  /  DBili  x   /  AST  14  /  ALT  12  /  AlkPhos  72  11-03        ABG - ( 02 Nov 2021 10:08 )  pH, Arterial: 7.51  pH, Blood: x     /  pCO2: 38    /  pO2: 145   / HCO3: 30    / Base Excess: 6.8   /  SaO2: 99.5              PHYSICAL EXAM:  GENERAL: NAD  HEAD:  Atraumatic, Normocephalic  NECK: Supple, No JVD  NERVOUS SYSTEM:  A&O*3, follows command   CHEST/LUNG: Clear to auscultation bilaterally; No rales, rhonchi, wheezing, or rubs  HEART: Regular rate and rhythm; No murmurs, rubs, or gallops  ABDOMEN: Soft, Nontender, Nondistended; Bowel sounds present  EXTREMITIES: No edema

## 2021-11-03 NOTE — PROGRESS NOTE BEHAVIORAL HEALTH - NSBHFUPINTERVALHXFT_PSY_A_CORE
Patient was seen and interviewed , 1 to 1 staff at bed side.   upon approach, patient is observed to be lying on her bed calm and cooperative, appears anxious . she reports that she feels very stupid for her actions  and has not been able to sleep. She report feeling very depressed but no longer has suicidal thoughts, intent or plan.   patient was offered atarax for sleep and anxiety.   According to the nurse taking care of the patient, patient is still on precedex  and has not been medically cleared

## 2021-11-03 NOTE — EEG REPORT - NS EEG TEXT BOX
Epilepsy Attending Note:     PREETHI NASH    25y Female  MRN [id number]    Vital Signs Last 24 Hrs  T(C): 37 (03 Nov 2021 07:00), Max: 37.3 (02 Nov 2021 10:55)  T(F): 98.6 (03 Nov 2021 07:00), Max: 99.1 (02 Nov 2021 10:55)  HR: 84 (03 Nov 2021 09:00) (74 - 121)  BP: 132/91 (03 Nov 2021 09:00) (118/74 - 140/84)  BP(mean): 108 (03 Nov 2021 09:00) (90 - 110)  RR: 22 (02 Nov 2021 20:00) (20 - 28)  SpO2: 100% (03 Nov 2021 09:00) (99% - 100%)                          10.9   5.67  )-----------( 232      ( 03 Nov 2021 06:16 )             32.6       11-03    139  |  101  |  11  ----------------------------<  104<H>  3.6   |  24  |  0.5<L>    Ca    8.7      03 Nov 2021 06:16    TPro  5.7<L>  /  Alb  3.3<L>  /  TBili  0.5  /  DBili  x   /  AST  14  /  ALT  12  /  AlkPhos  72  11-03      MEDICATIONS  (STANDING):  chlorhexidine 0.12% Liquid 15 milliLiter(s) Oral Mucosa every 12 hours  chlorhexidine 4% Liquid 1 Application(s) Topical <User Schedule>  dexMEDEtomidine Infusion 0.05 MICROgram(s)/kG/Hr (0.65 mL/Hr) IV Continuous <Continuous>  enoxaparin Injectable 40 milliGRAM(s) SubCutaneous daily  famotidine Injectable 20 milliGRAM(s) IV Push every 12 hours    MEDICATIONS  (PRN):  LORazepam   Injectable 2.5 milliGRAM(s) IV Push once PRN Seizure activity            VEEG in the last 24 hours:    Background - towards the latter part of the recording the background becomes more organized, showing higher frequencies reaching 7-8 Hz presented symmetrically with good level of reactivity    Focal and generalized slowing - mild to moderate generalized slowing, no focal slowing    Interictal activity - none    Events - none    Seizures - none    Impression: Abnormal VEEG due to the presence of generalized slowing, perhaps due to toxic-metabolic caused.    Plan - As per general neurology

## 2021-11-03 NOTE — PROGRESS NOTE ADULT - ASSESSMENT
IMPRESSION:    Overdose  acute hypoxemic respiratory failure s/p extubation   seizure  Serotonin syndrome  Suicidal Iseation    PLAN:    CNS:  follow neurology taper precedex off     HEENT: oral care, et tube care    PULMONARY: keep pox > 92 % incentive spirometer     CARDIOVASCULAR: keep i=o. .     GI: GI prophylaxis. speech and swallow eval     RENAL: fu lytes.    INFECTIOUS DISEASE: off antibx    HEMATOLOGICAL:  DVT prophylaxis.    ENDOCRINE:  Follow up FS q4h.  Insulin protocol if needed.     MICU monitoring  oob to chair   d/c alycia    IMPRESSION:    Overdose  acute hypoxemic respiratory failure s/p extubation   seizure  Serotonin syndrome  Suicidal Iseation    PLAN:    CNS:  follow neurology taper precedex off   1:1 sit   pysch consult   HEENT: oral care, et tube care    PULMONARY: keep pox > 92 % incentive spirometer     CARDIOVASCULAR: keep i=o. .     GI: GI prophylaxis. speech and swallow eval     RENAL: fu lytes.    INFECTIOUS DISEASE: off antibx    HEMATOLOGICAL:  DVT prophylaxis.    ENDOCRINE:  Follow up FS q4h.  Insulin protocol if needed.     MICU monitoring  oob to chair   d/c tong   d/c rectal tube

## 2021-11-03 NOTE — PHYSICAL THERAPY INITIAL EVALUATION ADULT - ADDITIONAL COMMENTS
Patient resides with her mother in a private home, 1 step to enter then no additional stairs once inside. Independent with all mobility / ADLs at baseline.

## 2021-11-03 NOTE — PROGRESS NOTE BEHAVIORAL HEALTH - SUMMARY
Ms Edmonds is a 25 year old woman with a history of major depression , Generalized Anxiety disorder and  Borderline Personality disorder, who was admitted for   following an overdose on Bupropion, Effexor and Trileptal in a possible suicide attempt. Psychiatry has been consulted to evaluate the patient for suicidality.    Patient continues to feel depressed and anxious and although she appears to feel remorse for her actions , she seems to be experiencing associated shame . Patient is also has worsening anxiety and insomnia .   At this time, patient is considered a danger to herself and will need involuntary inpatient psychiatric hospitalization upon medical clearance for medication management and symptom stabilization upon medical clearance . For now , we recommend giving Atarax on an as needed basis for anxiety.

## 2021-11-03 NOTE — PROGRESS NOTE ADULT - ASSESSMENT
25 year old F hx of borderline personality disorder, anxiety, depression, substance abuse, prior SI by ingestion, presents to ED with ingestions. Patient reported taking Venlafaxine 150mg x 20 tab and bupropion 100mg x 20 tabs at ~3pm. Denies other co-ingestions. In the ED, the patient is asymptomatic, with normal vitals and ekg, baseline mental status, afebrile, no hyperreflexia or clonus. Patient intubated in the ICU as per Toxicology, had hyperreflexia and some myoclonus and was started on Cyproheptadine.    # Drug Overdose with Venlafaxine and Bupropion  # Serotonin Syndrome  # Suicide Attempt  # H/O Suicide Attempt  # Borderline personality disorder  # Anxiety/depression  # H/O Substance Abuse  - Took 150x20 of Venlafaxine, Bupropion 100 x 20  - s/p Activated charcoal given 50grams via OGT as per toxicology  - s/p intubation by anesthesia 10/29/2021 and extubation on 11/02/2021  - s/p Polyethylene Glycol soln given as per toxicology  - Golytely q1h until rectal effluent clear or total of 10L effluent is excreted  - No evidence of serotonin syndrome on exam. Continue to hold Cyproheptadine   - Psych consulted, will need inpatient psych admission after medical clearance, for now continue 1:1 observation   -PT evaluation   -Passed Speech and swallow eval, start regular diet    # Seizures  - Likely from overdose  - Ativan PRN  - EEG was done, did not show any epileptiform activity   -CT head was done as per Neuro recs, prelim read did not show any major acute pathology, will follow official read    # Hypocalcemia  - Positive Trousseau + Episode of seizure  - Will supplement with IV Calcium Gluconate 2g  - Calcium levels WNL      DVT: Lovenox  GI: Protonix  Diet: Regular  PT eval   Dispo: Acute

## 2021-11-03 NOTE — PHYSICAL THERAPY INITIAL EVALUATION ADULT - PERTINENT HX OF CURRENT PROBLEM, REHAB EVAL
Admitted for drug overdose, suicidal behavior with attempted self injury ; seizure in ED ; s/p intubation by anesthesia 10/29/2021 and extubation on 11/02/2021

## 2021-11-03 NOTE — PROGRESS NOTE ADULT - ASSESSMENT
24 yo F with PMHx of MDD, Borderline Personality disorder, Anxiety, at least 2 IPP admissions, prior suicide attempts by OD, currently in treatment with Dr Cross and Ms Avril Stokes, who is admitted to the ICU after she OD on Bupropion, Effexor and Trileptal in a suicide attempt.     - extubated, now awake and alert   - S&S eval ->advance diet as tolerated   - continue 1:1 sit   - no psych meds for now as per psychiatry - f/u today   - will probably need IPP admission after medical clearance   - PT consult -> ambulate as tolerated   - wean and DC Precedex   - DVT prophylaxis   - neuro f/u for abnormal EEG     26 yo F with PMHx of MDD, Borderline Personality disorder, Anxiety, at least 2 IPP admissions, prior suicide attempts by OD, currently in treatment with Dr Cross and Ms Avril Stokes, who is admitted to the ICU after she OD on Bupropion, Effexor and Trileptal in a suicide attempt.     - extubated, now awake and alert   - S&S eval ->advance diet as tolerated   - continue 1:1 sit   - no psych meds for now as per psychiatry - f/u today   - will probably need IPP admission after medical clearance   - DC Rosas   - PT consult -> ambulate as tolerated   - wean and DC Precedex   - DVT prophylaxis   - neuro f/u for abnormal EEG

## 2021-11-03 NOTE — PROGRESS NOTE ADULT - SUBJECTIVE AND OBJECTIVE BOX
Patient is awake and alert, hungry, confirms anxiety, depression and suicide attempt with pills      T(F): 98.6 (11-03-21 @ 07:00), Max: 99.1 (11-02-21 @ 10:55)  HR: 85 (11-03-21 @ 08:00)  BP: 136/91 (11-03-21 @ 08:00)  RR: 18  SpO2: 100% (11-03-21 @ 08:00) (99% - 100%)    PHYSICAL EXAM:  GENERAL: NAD  HEAD:  Atraumatic, Normocephalic  EYES: EOMI, PERRLA, conjunctiva and sclera clear  NERVOUS SYSTEM:  Alert & Oriented X3, no focal deficits   CHEST/LUNG: Clear to percussion bilaterally; No rales, rhonchi, wheezing, or rubs  HEART: Regular rate and rhythm; No murmurs, rubs, or gallops  ABDOMEN: Soft, Nontender, Nondistended; Bowel sounds present  EXTREMITIES:  2+ Peripheral Pulses, No clubbing, cyanosis, or edema    LABS  11-03    139  |  101  |  11  ----------------------------<  104<H>  3.6   |  24  |  0.5<L>    Ca    8.7      03 Nov 2021 06:16    TPro  5.7<L>  /  Alb  3.3<L>  /  TBili  0.5  /  DBili  x   /  AST  14  /  ALT  12  /  AlkPhos  72  11-03                          10.9   5.67  )-----------( 232      ( 03 Nov 2021 06:16 )             32.6     < from: 12 Lead ECG (11.01.21 @ 07:30) >  Ventricular Rate 89 BPM    Atrial Rate 89 BPM    P-R Interval 128 ms    QRS Duration 74 ms    Q-T Interval 352 ms    QTC Calculation(Bazett) 428 ms    P Axis 80 degrees    R Axis 83 degrees    T Axis 57 degrees    Diagnosis Line Normal sinus rhythm  Normal ECG    < end of copied text >    Culture Results:   <10,000 CFU/mL Normal Urogenital Zunilda (10-28-21)    RADIOLOGY  < from: Xray Chest 1 View-PORTABLE IMMEDIATE (Xray Chest 1 View-PORTABLE IMMEDIATE .) (11.01.21 @ 10:28) >  Impression:    No focal consolidation      < end of copied text >    VEEG in the last 24 hours: sedated and intubated    Background----------- symmetrical, mainly sedated sleep. It also shows periods of arousal that reaches frequencies in the range of 6-7 hz superimposed by diffusely expressed bursts of low theta and delta or bilateral posterior quadrant runs of theta    Focal and generalized slowing-----------  moderate generalized slowing    Interictal activity----------no clear epileptiform activity    Events----------------- none    Seizures----------  none      MEDICATIONS  (STANDING):  chlorhexidine 0.12% Liquid 15 milliLiter(s) Oral Mucosa every 12 hours  chlorhexidine 4% Liquid 1 Application(s) Topical <User Schedule>  dexMEDEtomidine Infusion 0.05 MICROgram(s)/kG/Hr (0.65 mL/Hr) IV Continuous <Continuous>  enoxaparin Injectable 40 milliGRAM(s) SubCutaneous daily  famotidine Injectable 20 milliGRAM(s) IV Push every 12 hours    MEDICATIONS  (PRN):  LORazepam   Injectable 2.5 milliGRAM(s) IV Push once PRN Seizure activity

## 2021-11-03 NOTE — PHYSICAL THERAPY INITIAL EVALUATION ADULT - GENERAL OBSERVATIONS, REHAB EVAL
13:57 - 14:20. Chart reviewed. Patient available to be seen for physical therapy, confirmed with nurse. Patient encountered semi-reclined in bed, +IV, +tele, PCA and mother at bedside. Denies pain at rest, c/o anxiety, but is ready to try to get up with PT now.

## 2021-11-04 ENCOUNTER — TRANSCRIPTION ENCOUNTER (OUTPATIENT)
Age: 26
End: 2021-11-04

## 2021-11-04 ENCOUNTER — INPATIENT (INPATIENT)
Facility: HOSPITAL | Age: 26
LOS: 5 days | Discharge: HOME | End: 2021-11-10
Attending: PSYCHIATRY & NEUROLOGY | Admitting: PSYCHIATRY & NEUROLOGY
Payer: MEDICAID

## 2021-11-04 VITALS
HEART RATE: 111 BPM | WEIGHT: 114.86 LBS | HEIGHT: 65 IN | RESPIRATION RATE: 18 BRPM | DIASTOLIC BLOOD PRESSURE: 92 MMHG | SYSTOLIC BLOOD PRESSURE: 140 MMHG

## 2021-11-04 VITALS
OXYGEN SATURATION: 100 % | TEMPERATURE: 98 F | RESPIRATION RATE: 18 BRPM | HEART RATE: 91 BPM | SYSTOLIC BLOOD PRESSURE: 144 MMHG | DIASTOLIC BLOOD PRESSURE: 90 MMHG

## 2021-11-04 PROBLEM — F41.9 ANXIETY DISORDER, UNSPECIFIED: Chronic | Status: ACTIVE | Noted: 2021-10-28

## 2021-11-04 PROBLEM — Z91.51 PERSONAL HISTORY OF SUICIDAL BEHAVIOR: Chronic | Status: ACTIVE | Noted: 2021-10-28

## 2021-11-04 LAB
A1C WITH ESTIMATED AVERAGE GLUCOSE RESULT: 5.4 % — SIGNIFICANT CHANGE UP (ref 4–5.6)
ALBUMIN SERPL ELPH-MCNC: 3.8 G/DL — SIGNIFICANT CHANGE UP (ref 3.5–5.2)
ALP SERPL-CCNC: 108 U/L — SIGNIFICANT CHANGE UP (ref 30–115)
ALT FLD-CCNC: 64 U/L — HIGH (ref 0–41)
ANION GAP SERPL CALC-SCNC: 13 MMOL/L — SIGNIFICANT CHANGE UP (ref 7–14)
AST SERPL-CCNC: 59 U/L — HIGH (ref 0–41)
BILIRUB SERPL-MCNC: 0.5 MG/DL — SIGNIFICANT CHANGE UP (ref 0.2–1.2)
BUN SERPL-MCNC: 9 MG/DL — LOW (ref 10–20)
CALCIUM SERPL-MCNC: 9 MG/DL — SIGNIFICANT CHANGE UP (ref 8.5–10.1)
CHLORIDE SERPL-SCNC: 103 MMOL/L — SIGNIFICANT CHANGE UP (ref 98–110)
CO2 SERPL-SCNC: 26 MMOL/L — SIGNIFICANT CHANGE UP (ref 17–32)
CREAT SERPL-MCNC: 0.5 MG/DL — LOW (ref 0.7–1.5)
DRUG SCREEN, SERUM: SIGNIFICANT CHANGE UP
ESTIMATED AVERAGE GLUCOSE: 108 MG/DL — SIGNIFICANT CHANGE UP (ref 68–114)
GLUCOSE SERPL-MCNC: 88 MG/DL — SIGNIFICANT CHANGE UP (ref 70–99)
HCT VFR BLD CALC: 36.9 % — LOW (ref 37–47)
HGB BLD-MCNC: 12.2 G/DL — SIGNIFICANT CHANGE UP (ref 12–16)
MAGNESIUM SERPL-MCNC: 2 MG/DL — SIGNIFICANT CHANGE UP (ref 1.8–2.4)
MCHC RBC-ENTMCNC: 30.3 PG — SIGNIFICANT CHANGE UP (ref 27–31)
MCHC RBC-ENTMCNC: 33.1 G/DL — SIGNIFICANT CHANGE UP (ref 32–37)
MCV RBC AUTO: 91.8 FL — SIGNIFICANT CHANGE UP (ref 81–99)
NRBC # BLD: 0 /100 WBCS — SIGNIFICANT CHANGE UP (ref 0–0)
PLATELET # BLD AUTO: 233 K/UL — SIGNIFICANT CHANGE UP (ref 130–400)
POTASSIUM SERPL-MCNC: 3.6 MMOL/L — SIGNIFICANT CHANGE UP (ref 3.5–5)
POTASSIUM SERPL-SCNC: 3.6 MMOL/L — SIGNIFICANT CHANGE UP (ref 3.5–5)
PROT SERPL-MCNC: 6.8 G/DL — SIGNIFICANT CHANGE UP (ref 6–8)
RBC # BLD: 4.02 M/UL — LOW (ref 4.2–5.4)
RBC # FLD: 11.9 % — SIGNIFICANT CHANGE UP (ref 11.5–14.5)
SODIUM SERPL-SCNC: 142 MMOL/L — SIGNIFICANT CHANGE UP (ref 135–146)
WBC # BLD: 6.39 K/UL — SIGNIFICANT CHANGE UP (ref 4.8–10.8)
WBC # FLD AUTO: 6.39 K/UL — SIGNIFICANT CHANGE UP (ref 4.8–10.8)

## 2021-11-04 PROCEDURE — 99232 SBSQ HOSP IP/OBS MODERATE 35: CPT

## 2021-11-04 RX ORDER — VENLAFAXINE HCL 75 MG
0 CAPSULE, EXT RELEASE 24 HR ORAL
Qty: 0 | Refills: 0 | DISCHARGE

## 2021-11-04 RX ORDER — OXCARBAZEPINE 300 MG/1
0 TABLET, FILM COATED ORAL
Qty: 0 | Refills: 0 | DISCHARGE

## 2021-11-04 RX ORDER — BUPROPION HYDROCHLORIDE 150 MG/1
0 TABLET, EXTENDED RELEASE ORAL
Qty: 0 | Refills: 0 | DISCHARGE

## 2021-11-04 RX ORDER — HYDROXYZINE HCL 10 MG
1 TABLET ORAL
Qty: 0 | Refills: 0 | DISCHARGE
Start: 2021-11-04

## 2021-11-04 RX ORDER — INFLUENZA VIRUS VACCINE 15; 15; 15; 15 UG/.5ML; UG/.5ML; UG/.5ML; UG/.5ML
0.5 SUSPENSION INTRAMUSCULAR ONCE
Refills: 0 | Status: COMPLETED | OUTPATIENT
Start: 2021-11-04 | End: 2021-11-04

## 2021-11-04 RX ORDER — HYDROXYZINE HCL 10 MG
50 TABLET ORAL EVERY 6 HOURS
Refills: 0 | Status: DISCONTINUED | OUTPATIENT
Start: 2021-11-04 | End: 2021-11-10

## 2021-11-04 RX ORDER — TRAZODONE HCL 50 MG
50 TABLET ORAL AT BEDTIME
Refills: 0 | Status: DISCONTINUED | OUTPATIENT
Start: 2021-11-04 | End: 2021-11-10

## 2021-11-04 RX ORDER — HYDROXYZINE HCL 10 MG
25 TABLET ORAL ONCE
Refills: 0 | Status: COMPLETED | OUTPATIENT
Start: 2021-11-04 | End: 2021-11-04

## 2021-11-04 RX ADMIN — CHLORHEXIDINE GLUCONATE 1 APPLICATION(S): 213 SOLUTION TOPICAL at 05:53

## 2021-11-04 RX ADMIN — DEXMEDETOMIDINE HYDROCHLORIDE IN 0.9% SODIUM CHLORIDE 0.65 MICROGRAM(S)/KG/HR: 4 INJECTION INTRAVENOUS at 03:00

## 2021-11-04 RX ADMIN — INFLUENZA VIRUS VACCINE 0.5 MILLILITER(S): 15; 15; 15; 15 SUSPENSION INTRAMUSCULAR at 14:42

## 2021-11-04 RX ADMIN — Medication 25 MILLIGRAM(S): at 04:13

## 2021-11-04 RX ADMIN — ENOXAPARIN SODIUM 40 MILLIGRAM(S): 100 INJECTION SUBCUTANEOUS at 13:35

## 2021-11-04 RX ADMIN — Medication 25 MILLIGRAM(S): at 10:38

## 2021-11-04 RX ADMIN — Medication 50 MILLIGRAM(S): at 18:06

## 2021-11-04 NOTE — H&P ADULT - NSHPPHYSICALEXAM_GEN_ALL_CORE
Vital Signs (24 Hrs):  T(C): 36.6 (11-04-21 @ 15:20), Max: 37.1 (11-03-21 @ 19:01)  HR: 111 (11-04-21 @ 17:08) (78 - 111)  BP: 140/92 (11-04-21 @ 17:08) (114/80 - 144/90)  RR: 18 (11-04-21 @ 17:08) (12 - 18)  SpO2: 100% (11-04-21 @ 15:20) (99% - 100%)    PHYSICAL EXAM:  GENERAL: NAD, well-developed  SKIN: No rashes or lesions  HEAD:  Atraumatic, Normocephalic  EYES: EOMI, PERRLA, conjunctiva and sclera clear  NECK: Supple, No JVD  CHEST/LUNG: Clear to auscultation bilaterally; No wheeze  HEART: Regular rate and rhythm; No murmurs, rubs, or gallops  ABDOMEN: Soft, Nontender, Nondistended; Bowel sounds present  EXTREMITIES:  No clubbing, cyanosis, or edema  CNS: AAOx3

## 2021-11-04 NOTE — PROGRESS NOTE ADULT - ASSESSMENT
IMPRESSION:    Overdose  acute hypoxemic respiratory failure s/p extubation   seizure  Serotonin syndrome  Suicidal Iseation    PLAN:    CNS:  follow neurology taper precedex off   1:1 sit   follow psych recommendation     HEENT: oral care,     PULMONARY: keep pox > 92 % incentive spirometer     CARDIOVASCULAR: keep i=o. .     GI: GI prophylaxis.  feed as speech and swallow     RENAL: fu lytes.    INFECTIOUS DISEASE: off antibx    HEMATOLOGICAL:  DVT prophylaxis.    ENDOCRINE:  Follow up FS q4h.  Insulin protocol if needed.     g  oob to chair

## 2021-11-04 NOTE — DISCHARGE NOTE PROVIDER - CARE PROVIDER_API CALL
Vijay Miller)  [facility]; Psychiatry  [address] [place] [address]  Phone: [phone]  Fax: [phone]  Follow Up Time: 2 weeks

## 2021-11-04 NOTE — PROGRESS NOTE ADULT - SUBJECTIVE AND OBJECTIVE BOX
Patient is a 25y old  Female who presents with a chief complaint of Drug overdose (04 Nov 2021 07:35)      Over Night Events:  Patient seen and examined.   awake oriented episode hallucination     ROS:  See HPI    PHYSICAL EXAM    ICU Vital Signs Last 24 Hrs  T(C): 36.1 (04 Nov 2021 04:35), Max: 37.3 (03 Nov 2021 15:01)  T(F): 97 (04 Nov 2021 04:35), Max: 99.1 (03 Nov 2021 15:01)  HR: 87 (04 Nov 2021 06:35) (78 - 987)  BP: 125/85 (04 Nov 2021 06:35) (113/74 - 136/91)  BP(mean): 101 (04 Nov 2021 06:35) (87 - 109)  ABP: --  ABP(mean): --  RR: 18 (04 Nov 2021 06:35) (12 - 20)  SpO2: 99% (04 Nov 2021 06:35) (99% - 100%)      General: AO  HEENT:   rupinder             Lymph Nodes: NO cervical LN   Lungs: Bilateral BS  Cardiovascular: Regular   Abdomen: Soft, Positive BS  Extremities: No clubbing   Skin: warm   Neurological: no focal deficit   Musculoskeletal: move all ext     I&O's Detail    03 Nov 2021 07:01  -  04 Nov 2021 07:00  --------------------------------------------------------  IN:    Dexmedetomidine: 304 mL    Oral Fluid: 680 mL  Total IN: 984 mL    OUT:    Indwelling Catheter - Urethral (mL): 850 mL    Rectal Tube (mL): 200 mL    Voided (mL): 525 mL  Total OUT: 1575 mL    Total NET: -591 mL          LABS:                          12.2   6.39  )-----------( 233      ( 04 Nov 2021 06:00 )             36.9         03 Nov 2021 06:16    139    |  101    |  11     ----------------------------<  104    3.6     |  24     |  0.5      Ca    8.7        03 Nov 2021 06:16                                                                                                                                                                                                                                   ABG - ( 02 Nov 2021 10:08 )  pH, Arterial: 7.51  pH, Blood: x     /  pCO2: 38    /  pO2: 145   / HCO3: 30    / Base Excess: 6.8   /  SaO2: 99.5                MEDICATIONS  (STANDING):  chlorhexidine 4% Liquid 1 Application(s) Topical <User Schedule>  dexMEDEtomidine Infusion 0.05 MICROgram(s)/kG/Hr (0.65 mL/Hr) IV Continuous <Continuous>  enoxaparin Injectable 40 milliGRAM(s) SubCutaneous daily  hydrOXYzine hydrochloride 25 milliGRAM(s) Oral once    MEDICATIONS  (PRN):  hydrOXYzine hydrochloride 25 milliGRAM(s) Oral every 6 hours PRN Anxiety  LORazepam   Injectable 2.5 milliGRAM(s) IV Push once PRN Seizure activity          Xrays:  TLC:  OG:  ET tube:                                                                                       ECHO:  CAM ICU:

## 2021-11-04 NOTE — PROGRESS NOTE ADULT - REASON FOR ADMISSION
Drug overdose

## 2021-11-04 NOTE — PROGRESS NOTE ADULT - SUBJECTIVE AND OBJECTIVE BOX
Neurology Follow up note    Name  PREETHI NASH    HPI:  25 year old F hx of borderline personality disorder, anxiety, depression, substance abuse, prior SI by ingestion, presents to ED with ingestions. Patient reported taking Venlafaxine 150mg x 20 tab and bupropion 100mg x 20 tabs at ~3pm.   (28 Oct 2021 21:56)      Interval History - patient extubated, off sedation, a&ox4 sitting up in chair. Patient reports feeling well. Has periods where she "feels shaky" and sometimes has "paranoid" symptoms. These were present prior to admission.      Vital Signs Last 24 Hrs  T(C): 36.1 (04 Nov 2021 04:35), Max: 37.3 (03 Nov 2021 15:01)  T(F): 97 (04 Nov 2021 04:35), Max: 99.1 (03 Nov 2021 15:01)  HR: 96 (04 Nov 2021 07:16) (78 - 987)  BP: 131/85 (04 Nov 2021 07:16) (113/74 - 135/85)  BP(mean): 104 (04 Nov 2021 07:16) (87 - 104)  RR: 18 (04 Nov 2021 07:16) (12 - 20)  SpO2: 100% (04 Nov 2021 07:16) (99% - 100%)      Neurological Exam:     Mental status: Awake, alert and Oriented to time/place/person; Follows commands.  Recent and remote memory intact.  Naming, repetition and comprehension intact. Alert sitting in chair  Cranial nerves:  pupils equally round and reactive to light, visual fields full, no nystagmus, face symmetric, hearing intact to finger rub, sternocleidomastoid/shoulder shrug strength bilaterally 5/5.    Motor:  Normal bulk and tone, strength 5/5 in bilateral upper and lower extremities.   strength 5/5.   Sensation: Intact to light touch,  No neglect.  No clonus  Coordination: No dysmetria on finger-to-nose and heel-to-shin.  No clumsiness.  Reflexes: 2+ in upper and lower extremities, downgoing toes bilaterally  Gait: steady      Medications  chlorhexidine 4% Liquid 1 Application(s) Topical <User Schedule>  enoxaparin Injectable 40 milliGRAM(s) SubCutaneous daily  hydrOXYzine hydrochloride 25 milliGRAM(s) Oral every 6 hours PRN  LORazepam   Injectable 2.5 milliGRAM(s) IV Push once PRN      Lab                        12.2   6.39  )-----------( 233      ( 04 Nov 2021 06:00 )             36.9     11-04    142  |  103  |  9<L>  ----------------------------<  88  3.6   |  26  |  0.5<L>    Ca    9.0      04 Nov 2021 06:00  Mg     2.0     11-04    TPro  6.8  /  Alb  3.8  /  TBili  0.5  /  DBili  x   /  AST  59<H>  /  ALT  64<H>  /  AlkPhos  108  11-04    CAPILLARY BLOOD GLUCOSE        LIVER FUNCTIONS - ( 04 Nov 2021 06:00 )  Alb: 3.8 g/dL / Pro: 6.8 g/dL / ALK PHOS: 108 U/L / ALT: 64 U/L / AST: 59 U/L / GGT: x

## 2021-11-04 NOTE — PROGRESS NOTE ADULT - PROVIDER SPECIALTY LIST ADULT
Hospitalist
Neurology
Neurology
Internal Medicine
Internal Medicine
Neurology
Pulmonology
TeleCritical Care
Hospitalist
Internal Medicine
Neurology
Neurology
Pulmonology
Toxicology (Non-Face-To-Face)
Critical Care
Hospitalist
Internal Medicine

## 2021-11-04 NOTE — H&P ADULT - NSHPLABSRESULTS_GEN_ALL_CORE
12.2   6.39  )-----------( 233      ( 04 Nov 2021 06:00 )             36.9       11-04    142  |  103  |  9<L>  ----------------------------<  88  3.6   |  26  |  0.5<L>    Ca    9.0      04 Nov 2021 06:00  Mg     2.0     11-04    TPro  6.8  /  Alb  3.8  /  TBili  0.5  /  DBili  x   /  AST  59<H>  /  ALT  64<H>  /  AlkPhos  108  11-04          Magnesium, Serum: 2.0 mg/dL (11-04-21 @ 06:00)            Culture Results:   <10,000 CFU/mL Normal Urogenital Zunilda (10-28 @ 16:27)

## 2021-11-04 NOTE — DISCHARGE NOTE PROVIDER - NSDCCPCAREPLAN_GEN_ALL_CORE_FT
PRINCIPAL DISCHARGE DIAGNOSIS  Diagnosis: Drug overdose  Assessment and Plan of Treatment: You came to the hospital because you took your medications in over dose, You took more than it was recommended which resulted in you coming to the ED. You had something called serotonin sydrome which is a condition that happens when you take too much of the antidepressent. this syndrome can cause rigidity of the body, increased tempreture and seizures. You were intubated in the Emergency department because of this syndrome and you were monitored in the ICU where you had resolution of these symptoms. You were evaluated by the psych team who decided that it would be necessary for you to be admitted to their service for further management      SECONDARY DISCHARGE DIAGNOSES  Diagnosis: Suicidal behavior with attempted self-injury  Assessment and Plan of Treatment:     Diagnosis: Seizure  Assessment and Plan of Treatment:

## 2021-11-04 NOTE — DISCHARGE NOTE PROVIDER - HOSPITAL COURSE
25 year old F hx of borderline personality disorder, anxiety, depression, substance abuse, prior SI by ingestion, presents to ED with ingestions. Patient reported taking Venlafaxine 150mg x 20 tab and bupropion 100mg x 20 tabs at ~3pm. Denies other co-ingestions. In the ED, the patient is asymptomatic, with normal vitals and ekg, baseline mental status, afebrile, no hyperreflexia or clonus. Patient intubated in the ICU as per Toxicology, had hyperreflexia and some myoclonus and was started on Cyproheptadine.    # Drug Overdose with Venlafaxine and Bupropion  # Serotonin Syndrome  # Suicide Attempt  # H/O Suicide Attempt  # Borderline personality disorder  # Anxiety/depression  # H/O Substance Abuse  - Took 150x20 of Venlafaxine, Bupropion 100 x 20  - s/p Activated charcoal given 50grams via OGT as per toxicology  - s/p intubation by anesthesia 10/29/2021 and extubation on 11/02/2021  - s/p Polyethylene Glycol soln given as per toxicology  - Golytely q1h until rectal effluent clear or total of 10L effluent is excreted  - No evidence of serotonin syndrome on exam. Continue to hold Cyproheptadine   - Psych consulted, will need inpatient psych admission after medical clearance, for now continue 1:1 observation   - Passed Speech and swallow eval, start regular diet  - Neurology signed off, no need for AED or any other investigations     # Seizures  - Likely from overdose  - EEG was done, did not show any epileptiform activity   - No need for AED     # Hypocalcemia  - Positive Trousseau + Episode of seizure  - Will supplement with IV Calcium Gluconate 2g  - Calcium levels WNL

## 2021-11-04 NOTE — H&P ADULT - ASSESSMENT
25F w/PMHx of borderline personality disorder, anxiety, depression, substance abuse, s/p recent suicide attempt on 10/28, admitted to ICU and intubated/extubated, transferred to IPP for further management.    #Borderline Personality disorder s/p recent suicide attempt  - admit to IPP  - plan per psych  - medicine c/s PRN

## 2021-11-04 NOTE — CHART NOTE - NSCHARTNOTEFT_GEN_A_CORE
Saw pt on arrival to the unit. 26yo w with h/o depression and anxiety transferred from ICU where she was admitted s/p SA by OD on Effexor and bupropion. Pt reports feeling "OK" but presents with anxious affect. She denies current SI/Hi and grossly does not appear to be psychotic. PRNs for hydroxyzine, Trazodone were entered, pt will be on enhanced supervision.

## 2021-11-04 NOTE — DISCHARGE NOTE NURSING/CASE MANAGEMENT/SOCIAL WORK - PATIENT PORTAL LINK FT
You can access the FollowMyHealth Patient Portal offered by [facility] by registering at the following website: [email or web address]. By joining [employer]’s FollowMyHealth portal, you will also be able to view your health information using other applications (apps) compatible with our system.

## 2021-11-04 NOTE — PROGRESS NOTE ADULT - ASSESSMENT
Patient is a 25 year old Female presenting with drug overdose s/p extubation.  Witnessed Seizure x 1 in ED, intubated and s/p gastric flush. No seizures or epileptiform discharges on first 24 hours of VEEG.  EEG: generalized slowing.  [facility] WNL    Recommend:  - no further intervention from neurological standpoint  - psychiatry follow up  - no AED indicated

## 2021-11-04 NOTE — PROGRESS NOTE ADULT - ATTENDING COMMENTS
Patient examined today and I discussed results of testing with her and that I felt her brain had recovered well from neurologic standpoint.  I told her next steps are for psychiatrists to help her with anxiety and mood disorder.

## 2021-11-04 NOTE — PATIENT PROFILE BEHAVIORAL HEALTH - NSSUBSTANCEUSE_GEN_ALL_CORE_SD
Was the patient seen in the last year in this department? Yes    Does patient have an active prescription for medications requested? No     Received Request Via: Patient  
street drug/inhalant/medication abuse

## 2021-11-04 NOTE — PROGRESS NOTE ADULT - ASSESSMENT
26 yo F with PMHx of MDD, Borderline Personality disorder, Anxiety, at least 2 IPP admissions, prior suicide attempts by OD, currently in treatment with Dr Cross and Ms Avril Stokes, who is admitted to the ICU after she OD on Bupropion, Effexor and Trileptal in a suicide attempt.     - now awake and alert with some paranoid thoughts    - tolerating  diet    - DC Precedex   - continue 1:1 sit   - no psych meds for now as per psychiatry except Atarax prn   - medically stable for DC to  IPP    - DVT prophylaxis   - neuro f/u today    24 yo F with PMHx of MDD, Borderline Personality disorder, Anxiety, at least 2 IPP admissions, prior suicide attempts by OD, currently in treatment with Dr Cross and Ms Avril Stokes, who is admitted to the ICU after she OD on Bupropion, Effexor and Trileptal in a suicide attempt.     - now awake and alert with some paranoid thoughts    - tolerating  diet    - DC Precedex   - continue 1:1 sit   - no psych meds for now as per psychiatry except Atarax prn   - medically stable for DC to  IPP    - DVT prophylaxis   - neuro f/u today    - 35min spent on pt care

## 2021-11-04 NOTE — CHART NOTE - NSCHARTNOTEFT_GEN_A_CORE
Registered Dietitian Follow-Up     Patient Profile Reviewed                           Yes [X]   No []     Nutrition History Previously Obtained        Yes []  No [X]       Pertinent Information: Met pt at bedside, extubated, seen by ST and upgraded to regular diet on 11/3, tolerating well, ate banana for breakfast, ordered PBJ and soup for lunch, normal appetite per pt. Per pt  lbs, 65 inches, NKFA, no specific food preferences or intolerances reported. Per MD note, pt medically stable for d/c.      Diet order:   Diet, Regular (21 @ 10:39) [Active]    Anthropometrics:  Height (cm): 165.1 (10-28-21 @ 23:14)  Weight (kg): 52.1 (10-28-21 @ 23:14)  BMI (kg/m2): 19.1 (10-28-21 @ 23:14)  IBW: 125 lbs    Daily Weight in k.3 (), Weight in k.4 (), Weight in k.2 (10-31), Weight in k.2 (10-30)  % Weight Change     MEDICATIONS  (STANDING):  chlorhexidine 4% Liquid 1 Application(s) Topical <User Schedule>  enoxaparin Injectable 40 milliGRAM(s) SubCutaneous daily    MEDICATIONS  (PRN):  hydrOXYzine hydrochloride 25 milliGRAM(s) Oral every 6 hours PRN Anxiety  LORazepam   Injectable 2.5 milliGRAM(s) IV Push once PRN Seizure activity    Pertinent Labs:  @ 06:00: Na 142, BUN 9<L>, Cr 0.5<L>, BG 88, K+ 3.6, Phos --, Mg 2.0, Alk Phos 108, ALT/SGPT 64<H>, AST/SGOT 59<H>    Physical Findings:  - Appearance: A/O x 4  - GI function: LBM   - Tubes: n/a  - Oral/Mouth cavity: good dentition  - Skin: WDL     Nutrition Requirements:  Weight Used:  lbs     Estimated Energy Needs    Continue []  Adjust [X]  Adjusted Energy Recommendations:  8667-6119 kcal/day (25-30 kcal/kg)     Estimated Protein Needs    Continue []  Adjust [X]  Adjusted Protein Recommendations:  42-51 gm/day (0.8-1 g/kg)      Estimated Fluid Needs        Continue []  Adjust [X]  Adjusted Fluid Recommendations:  1560 mL/day     Nutrient Intake: improving     [] Previous Nutrition Diagnosis:            [] Ongoing          [X] Resolved    Nutrition Intervention: none at this time     Goal/Expected Outcome: pt to meet % estimated needs via PO diet in 3 days     Indicator/Monitoring: po intake, weight, labs, NFPF    Recommendation: continue with current nutrition POC, will cont to follow.

## 2021-11-04 NOTE — PROGRESS NOTE ADULT - SUBJECTIVE AND OBJECTIVE BOX
Patient is awake and alert, tolerating oral diet, having paranoid thoughts       T(F): 97 (11-04-21 @ 04:35), Max: 99.1 (11-03-21 @ 15:01)  HR: 87 (11-04-21 @ 06:35)  BP: 125/85 (11-04-21 @ 06:35)  RR: 18 (11-04-21 @ 06:35)  SpO2: 99% (11-04-21 @ 06:35) (99% - 100%)    PHYSICAL EXAM:  GENERAL: NAD  HEAD:  Atraumatic, Normocephalic  EYES: EOMI, PERRLA, conjunctiva and sclera clear  NERVOUS SYSTEM:  Alert & Oriented X3, no focal deficits   CHEST/LUNG: Clear to percussion bilaterally; No rales, rhonchi, wheezing, or rubs  HEART: Regular rate and rhythm; No murmurs, rubs, or gallops  ABDOMEN: Soft, Nontender, Nondistended; Bowel sounds present  EXTREMITIES:  2+ Peripheral Pulses, No clubbing, cyanosis, or edema    LABS  11-03    139  |  101  |  11  ----------------------------<  104<H>  3.6   |  24  |  0.5<L>    Ca    8.7      03 Nov 2021 06:16    TPro  5.7<L>  /  Alb  3.3<L>  /  TBili  0.5  /  DBili  x   /  AST  14  /  ALT  12  /  AlkPhos  72  11-03                          12.2   6.39  )-----------( 233      ( 04 Nov 2021 06:00 )             36.9     VEEG in the last 24 hours:    Background - towards the latter part of the recording the background becomes more organized, showing higher frequencies reaching 7-8 Hz presented symmetrically with good level of reactivity    Focal and generalized slowing - mild to moderate generalized slowing, no focal slowing    Interictal activity - none    Events - none    Seizures - none    Impression: Abnormal VEEG due to the presence of generalized slowing, perhaps due to toxic-metabolic caused.    Culture Results:   <10,000 CFU/mL Normal Urogenital Zunilda (10-28-21)    RADIOLOGY  < from: CT Head No Cont (11.03.21 @ 09:19) >    IMPRESSION:    No CT evidence for acute intracranial pathology.    < end of copied text >    MEDICATIONS  (STANDING):  chlorhexidine 4% Liquid 1 Application(s) Topical <User Schedule>  dexMEDEtomidine Infusion 0.05 MICROgram(s)/kG/Hr (0.65 mL/Hr) IV Continuous <Continuous>  enoxaparin Injectable 40 milliGRAM(s) SubCutaneous daily  hydrOXYzine hydrochloride 25 milliGRAM(s) Oral once    MEDICATIONS  (PRN):  hydrOXYzine hydrochloride 25 milliGRAM(s) Oral every 6 hours PRN Anxiety  LORazepam   Injectable 2.5 milliGRAM(s) IV Push once PRN Seizure activity

## 2021-11-05 DIAGNOSIS — F60.3 BORDERLINE PERSONALITY DISORDER: ICD-10-CM

## 2021-11-05 DIAGNOSIS — F12.10 CANNABIS ABUSE, UNCOMPLICATED: ICD-10-CM

## 2021-11-05 DIAGNOSIS — F41.1 GENERALIZED ANXIETY DISORDER: ICD-10-CM

## 2021-11-05 DIAGNOSIS — F33.2 MAJOR DEPRESSIVE DISORDER, RECURRENT SEVERE WITHOUT PSYCHOTIC FEATURES: ICD-10-CM

## 2021-11-05 LAB
CHOLEST SERPL-MCNC: 161 MG/DL — SIGNIFICANT CHANGE UP
COVID-19 SPIKE DOMAIN AB INTERP: POSITIVE
COVID-19 SPIKE DOMAIN ANTIBODY RESULT: >250 U/ML — HIGH
HDLC SERPL-MCNC: 29 MG/DL — LOW
LIPID PNL WITH DIRECT LDL SERPL: 109 MG/DL — HIGH
NON HDL CHOLESTEROL: 132 MG/DL — HIGH
SARS-COV-2 IGG+IGM SERPL QL IA: >250 U/ML — HIGH
SARS-COV-2 IGG+IGM SERPL QL IA: POSITIVE
TRIGL SERPL-MCNC: 111 MG/DL — SIGNIFICANT CHANGE UP

## 2021-11-05 PROCEDURE — 99232 SBSQ HOSP IP/OBS MODERATE 35: CPT

## 2021-11-05 RX ORDER — MIRTAZAPINE 45 MG/1
15 TABLET, ORALLY DISINTEGRATING ORAL AT BEDTIME
Refills: 0 | Status: DISCONTINUED | OUTPATIENT
Start: 2021-11-05 | End: 2021-11-10

## 2021-11-05 RX ORDER — GABAPENTIN 400 MG/1
100 CAPSULE ORAL EVERY 8 HOURS
Refills: 0 | Status: DISCONTINUED | OUTPATIENT
Start: 2021-11-05 | End: 2021-11-10

## 2021-11-05 RX ADMIN — GABAPENTIN 100 MILLIGRAM(S): 400 CAPSULE ORAL at 20:15

## 2021-11-05 RX ADMIN — Medication 50 MILLIGRAM(S): at 20:14

## 2021-11-05 RX ADMIN — MIRTAZAPINE 15 MILLIGRAM(S): 45 TABLET, ORALLY DISINTEGRATING ORAL at 20:15

## 2021-11-05 RX ADMIN — GABAPENTIN 100 MILLIGRAM(S): 400 CAPSULE ORAL at 13:46

## 2021-11-05 NOTE — CHART NOTE - NSCHARTNOTEFT_GEN_A_CORE
Social Work Admit Note:    Patient is 25 years of age female who was admitted for evaluation status post suicide attempt via overdose. Patient overdosed at home on her psychotropic medications after conflict with mother. Patient was treated on medical unit transferred to [facility] for treatment safety and observation. Patient has a long history of mental illness with prior inpatient admissions. Patient reports she has been in mental health treatment since age 10, first hospitalization approx. age 16 due to suicide attempt via overdose.  on of ten to twelve Tylenol.  At time of assessment in the emergency department patient presented as tearful and withdrawn.  Patient endorsed depression and anxiety for Patient denies current suicidal thoughts and expresses remorse with regard to recent overdose. Patient presents with significant shame surrounding recent events. Patient makes reference to poor self worth self esteem and self efficacy. Patient states she would like to be more self sufficient and independent but is not sure if she can do it. In the community patient resides in a 2 family home with her mother and her brother and his family in attached unit. Patient reports good relations with family and finds them to be supportive and  with genuine care and concern for her. Patient does mention she believes her relationship with her mother to be codependent and unhealthy at times. Patient states she is unemployed and has been for most of her life. Patient states she completed high school and holds a bachelors degree.  Patients states she does not "have a career or something like that" but hopes to find one. Patient states " I guess my purpose is to take care of my 3 cats".     Sexual History – LAWANDA 	  Family relationships and history – Patient reports good relations with family members.    Leisure Activity Assessment – Tending to her cats, reading, writing.   Community Supports – No known attendance in any self- help groups or other organizations.   Employment – patient is not employed at this time.   Substance Use Assessment – patient reports daily marijuana use.     History of suicidality or self- injurious behaviors – history of prior attempts, current self harm via burning.      Significant Loses – none identified.    Life Goals – patient verbalized goal of possible return to work in the future.       will continue to meet with patient 1:1 and with treatment team daily.     Discharge plan is for continued mental health treatment in outpatient setting.      Please refer to Social Work Psychosocial for additional information.
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
## 2021-11-05 NOTE — PROGRESS NOTE BEHAVIORAL HEALTH - NSBHADDHXPSYSOCFT_PSY_A_CORE
Unemployed. Single, no children. Lives with mother, in a 2 family house, where her brother and sister in law also reside.

## 2021-11-05 NOTE — CHART NOTE - NSCHARTNOTEFT_GEN_A_CORE
Spoke with pts therapist Avril ([phone]) who last saw Desirae on 10/12/21 during their weekly sessions, however, canceled the last two (scheduled for 10/19, 10/26) because she was not feeling well. During the last session (10/12) Desirae told her therapist that she "has been seeing an old booty call in the city but they broke up and since the break up someone from her past who is  with children contacted her." They discussed how this goes against pt's morals and ethics but it makes her feel validated so she does it anyway. They also discussed blocking the  man, however, it was unclear if she actually blocked him because she did not attend her next two appointments.     Avril recalls the phone call from pt admitting she took pills to end her life. Avril had her throw up, and pt's mom and Avril both called 911. She endorses the pt's first suicide attempt was about 7 years ago when the pt was around 18 years old.    Avril endorses the pt's diagnosis are borderline personality disorder, MDD, and ISACC, and says before the last session on 10/12 Desirae was doing well with DBT. Avril is concerned about pt's environment at home stating that it is toxic because her mom may also have borderline personality disorder because when Desirae was desperate and told her mom she wants to kill herself her mom replied "go ahead." However admits that she only hears Jaredies side of the story and does not formally know her mom. She recommends our psych team have a conversation with her mother and that we change her medications. Avril endorses that Desirae would benefit from not being released home and that she needs a higher level of care such as an outpatient treatment team, or a dialectic behavioral program that holds her accountable and works on impulse control. Avril says regardless of the higher level of care she would not abandon Desirae, but insists she needs more than therapy from Avril.

## 2021-11-05 NOTE — PROGRESS NOTE BEHAVIORAL HEALTH - NSBHFUPIPCHARTREVFT_PSY_A_CORE
Pt had a witnessed seizure in ED, after her overdose. she was intubated and sedated. Admitted to ICU, once medically stable was transferred to [facility] on 11/4/21.

## 2021-11-05 NOTE — CHART NOTE - NSCHARTNOTEFT_GEN_A_CORE
Ms. Edmonds remains on the unit for continued treatment, safety and observation.  met with patient to establish rapport and encourage participation in unit groups. Patient was engaging and open to interview. She discussed the details of her admission including her overdose on pills. She expressed regret for her actions and was shocked when she heard (earlier) she had been intubated.  listened, providing empathy and support when appropriate. Patient reports she sees a therapist in the community but is open to attending a partial hospital program, upon discharge, if that level of care is needed.      encouraged Ms. Edmonds to attend groups on the unit to decrease isolation and encourage treatment participation. Patient was provided with a DBT folder with handouts. Ms. Edmonds attended all three Social Work groups today, including the Crisis Skills group, Patient Support Group and Value and Contribution Group. She actively participated in all three groups and was open to reviewing the materials presented. The DBT [employer] ACCEPTS skill was reviewed in Crisis Skills group and patient shared that she often journals or will have a cup of coffee to sooth and distract during times of high stress. She discussed her goal to follow up with mental health treatment and explore her vocational opportunities when discharged. She did not express any additional concerns.     Ms. Edmonds will continue to meet with  for education, support and assistance with planning for discharge. She will continue to be encouraged to attend groups on the unit, until discharge.

## 2021-11-05 NOTE — CHART NOTE - NSCHARTNOTEFT_GEN_A_CORE
Spoke to Pt's therapist Shani ([phone]) who requested a HIPAA form be emailed to her to release Desirae's medical information. Desirae agreed to release her medical record and the HIPAA was sent to Shani via the email she provided. Will follow up with Shani.    Also called Pt's pharmacy, [facility] [phone], for her medications which include: venlafaxine ER 75mg & 150 mg QD (last picked up 10/1/21), trileptal 150 mg BID (last picked up 10/25/21), bupropion  mg QD AM (last picked up 10/20/21), norethindrone 0.35 mg QD (last picked up 10/17/21).

## 2021-11-05 NOTE — PROGRESS NOTE BEHAVIORAL HEALTH - NSBHADDHXPSYCHFT_PSY_A_CORE
History of MDD, anxiety, BPD.  This is the 4th time the patient has attempted suicide and the last attempt prior to this was a few years ago. She also reports multiple admissions at [facility] for depression. She reports that the patient has received ECT at [facility] for her depression 5-6 years ago.  Also history of burning self.   Currently follows up with Dr. Sánchez; has a weekly therapist, Avril.   Multiple medication trials including: Ativan, klonopin, lithium, latuda, abilify - does not feel anything helped her.   Current meds prior to admission were: Wellbutirn 100 mg qd, Trileptal 150 mg BID, Effexor 150 mg qam and 75 mg in the afternoon.

## 2021-11-06 LAB
CARBOXYTHC UR CFM-MCNC: 214 NG/ML — SIGNIFICANT CHANGE UP
CARBOXYTHC UR QL SCN: 25.5 MG/DL — SIGNIFICANT CHANGE UP
CARBOXYTHC UR QL SCN: 839 — SIGNIFICANT CHANGE UP
SARS-COV-2 RNA SPEC QL NAA+PROBE: SIGNIFICANT CHANGE UP
THC CREATININE URINE: 25.5 MG/DL — SIGNIFICANT CHANGE UP
THC METABOLITE/CREAT URINE: 839 — SIGNIFICANT CHANGE UP

## 2021-11-06 PROCEDURE — 99231 SBSQ HOSP IP/OBS SF/LOW 25: CPT

## 2021-11-06 RX ADMIN — GABAPENTIN 100 MILLIGRAM(S): 400 CAPSULE ORAL at 06:38

## 2021-11-06 RX ADMIN — GABAPENTIN 100 MILLIGRAM(S): 400 CAPSULE ORAL at 20:01

## 2021-11-06 RX ADMIN — MIRTAZAPINE 15 MILLIGRAM(S): 45 TABLET, ORALLY DISINTEGRATING ORAL at 20:01

## 2021-11-06 RX ADMIN — GABAPENTIN 100 MILLIGRAM(S): 400 CAPSULE ORAL at 13:53

## 2021-11-07 RX ADMIN — GABAPENTIN 100 MILLIGRAM(S): 400 CAPSULE ORAL at 13:36

## 2021-11-07 RX ADMIN — MIRTAZAPINE 15 MILLIGRAM(S): 45 TABLET, ORALLY DISINTEGRATING ORAL at 21:04

## 2021-11-07 RX ADMIN — GABAPENTIN 100 MILLIGRAM(S): 400 CAPSULE ORAL at 06:10

## 2021-11-07 RX ADMIN — GABAPENTIN 100 MILLIGRAM(S): 400 CAPSULE ORAL at 21:05

## 2021-11-07 NOTE — PROGRESS NOTE BEHAVIORAL HEALTH - NSBHCHARTREVIEWINVESTIGATE_PSY_A_CORE FT
< from: 12 Lead ECG (11.01.21 @ 07:30) >      Ventricular Rate 89 BPM    Atrial Rate 89 BPM    P-R Interval 128 ms    QRS Duration 74 ms    Q-T Interval 352 ms    QTC Calculation(Bazett) 428 ms    < end of copied text >    < from: 12 Lead ECG (11.01.21 @ 07:30) >      Ventricular Rate 89 BPM    Atrial Rate 89 BPM    P-R Interval 128 ms    QRS Duration 74 ms    Q-T Interval 352 ms    QTC Calculation(Bazett) 428 ms    < end of copied text >

## 2021-11-07 NOTE — PROGRESS NOTE BEHAVIORAL HEALTH - NSBHCHARTREVIEWLAB_PSY_A_CORE FT
Comprehensive Metabolic Panel in AM (11.04.21 @ 06:00)    Sodium, Serum: 142 mmol/L    Potassium, Serum: 3.6: Slighty Hemolyzed use with Caution mmol/L    Chloride, Serum: 103 mmol/L    Carbon Dioxide, Serum: 26 mmol/L    Anion Gap, Serum: 13 mmol/L    Blood Urea Nitrogen, Serum: 9 mg/dL    Creatinine, Serum: 0.5 mg/dL    Glucose, Serum: 88 mg/dL    Calcium, Total Serum: 9.0 mg/dL    Protein Total, Serum: 6.8 g/dL    Albumin, Serum: 3.8 g/dL    Bilirubin Total, Serum: 0.5 mg/dL    Alkaline Phosphatase, Serum: 108 U/L    Aspartate Aminotransferase (AST/SGOT): 59: Hemolyzed. Interpret with caution U/L    Alanine Aminotransferase (ALT/SGPT): 64 U/L    eGFR if Non : 133: Interpretative comment  Complete Blood Count in AM (11.04.21 @ 06:00)    Nucleated RBC: 0 /100 WBCs    WBC Count: 6.39 K/uL    RBC Count: 4.02 M/uL    Hemoglobin: 12.2 g/dL    Hematocrit: 36.9 %    Mean Cell Volume: 91.8 fL    Mean Cell Hemoglobin: 30.3 pg    Mean Cell Hemoglobin Conc: 33.1 g/dL    Red Cell Distrib Width: 11.9 %    Platelet Count - Automated: 233 K/uL

## 2021-11-08 LAB — SARS-COV-2 RNA SPEC QL NAA+PROBE: SIGNIFICANT CHANGE UP

## 2021-11-08 PROCEDURE — 99231 SBSQ HOSP IP/OBS SF/LOW 25: CPT

## 2021-11-08 RX ORDER — MIRTAZAPINE 45 MG/1
1 TABLET, ORALLY DISINTEGRATING ORAL
Qty: 0 | Refills: 0 | DISCHARGE
Start: 2021-11-08

## 2021-11-08 RX ORDER — GABAPENTIN 400 MG/1
1 CAPSULE ORAL
Qty: 0 | Refills: 0 | DISCHARGE
Start: 2021-11-08

## 2021-11-08 RX ADMIN — GABAPENTIN 100 MILLIGRAM(S): 400 CAPSULE ORAL at 06:27

## 2021-11-08 RX ADMIN — GABAPENTIN 100 MILLIGRAM(S): 400 CAPSULE ORAL at 13:52

## 2021-11-08 RX ADMIN — GABAPENTIN 100 MILLIGRAM(S): 400 CAPSULE ORAL at 21:18

## 2021-11-08 RX ADMIN — MIRTAZAPINE 15 MILLIGRAM(S): 45 TABLET, ORALLY DISINTEGRATING ORAL at 21:18

## 2021-11-08 NOTE — DISCHARGE NOTE BEHAVIORAL HEALTH - NSBHDCMEDSFT_PSY_A_CORE
Pt was started on Gabapentin 100 mg 18h for anxiety and remeron1 5 mg qhs for mood/anxiety/insomnia/appetite. Patient was compliant with the medications and denied any side-effects of the medications.

## 2021-11-08 NOTE — DISCHARGE NOTE BEHAVIORAL HEALTH - NSBHDCSUICPREVNU_PSY_A_CORE
received pt to rm 9, pt c/o received pt to rm 9, pt c/o lft upper side secondary to rash, unopened areas, pt stated he noticed it develop one week ago, denies fevers at home, pt A&Ox4, VSS, afebrile, pt medicated as ordered, EKG complete, pending CXR, will monitor. [phone]

## 2021-11-08 NOTE — CHART NOTE - NSCHARTNOTEFT_GEN_A_CORE
Social Work Note:    Patient remains on the unit for continued treatment, safety, and observation.  Treatment team met with patient during morning rounds.  Patient was calm, cooperative, and pleasant.  Patient endorses improved mood.  She reports good sleep and appetite.  Patient states she is feeing wonderful. Patient denies SI/HI and A/V/H.  Patient has been medication compliant and in good behavioral control.  Patient is AOx3. Patient is asking about discharge, states she is feeling well enough to discuss discharge at this time.     Once stable for discharge, patient will return to his home in [place] with her mother, writer confirmed with mother Ashleigh. Patient and mother expressed interest in PHP; information and support provided. Patient is slated to discharge from IPP on Wed 11-10-21 and begin PHP Thurs 11-11 providing no regressive behaviors and or medical complications.       No barriers to discharge identified.      At this time patient is not yet psychiatrically stable for discharge.       Mental Status Exam:    Mood – Improving   Sleep - Good  Appetite - Good  ADLs - WNL  Thought Process – Linear  Observation – [id number].
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
## 2021-11-08 NOTE — DISCHARGE NOTE BEHAVIORAL HEALTH - MEDICATION SUMMARY - MEDICATIONS TO STOP TAKING
I will STOP taking the medications listed below when I get home from the hospital:    hydrOXYzine hydrochloride 25 mg oral tablet  -- 1 tab(s) by mouth every 6 hours, As needed, Anxiety

## 2021-11-08 NOTE — DISCHARGE NOTE BEHAVIORAL HEALTH - NSBHDCRESPONSEFT_PSY_A_CORE
Pt has made significant progress over the course of hospitalization. With continuous psychotherapy from the treatment team and the medications, patient reports feeling better, sleeping and eating well. Thought process and insight improved. Pt was calm and cooperative and was in good behavioral control. Patient denied any suicidal or homicidal ideations. Pt expressed feeling less anxious. Patient denied any auditory or visual hallucinations. Pt was evaluated by treatment team, pt is stable for discharge and patient shows no imminent danger to self, others or property at this time. Pt understands and agrees with treatment plan and following up with outpatient. Psychoeducation provided regarding diagnosis, medications, treatment and follow up. Risks, benefits, alternatives discussed, all questions and concerns addressed and answered. Reviewed crisis intervention with verbalized understanding. Pt's mother felt that pt was doing better and did not have any safety concerns with discharge. Agreeable to do PHP.

## 2021-11-08 NOTE — DISCHARGE NOTE BEHAVIORAL HEALTH - NSBHDCSUICPROTECTFT_PSY_A_CORE
good support system, future oriented, engaging in outpatient treatment, compliant with medications, residential stability

## 2021-11-08 NOTE — DISCHARGE NOTE BEHAVIORAL HEALTH - PAST PSYCHIATRIC HISTORY
History of MDD, anxiety, BPD.  This is the 4th time the patient has attempted suicide and the last attempt prior to this was a few years ago. She also reports multiple admissions at [facility] for depression. She reports that the patient has received ECT at [facility] for her depression 5-6 years ago.  Also history of burning self.   Currently follows up with Dr. Sánchez; has a weekly therapist, Avril.   Multiple medication trials including: Ativan, klonopin, lithium, latuda, abilify - does not feel anything helped her.   Current meds prior to admission were: Wellbutirn 100 mg qd, Trileptal 150 mg BID, Effexor 150 mg qam and 75 mg in the afternoon.

## 2021-11-08 NOTE — DISCHARGE NOTE BEHAVIORAL HEALTH - FAMILY HISTORY OF PSYCHIATRIC ILLNESS
Unemployed. Single, no children. Lives with mother, in a 2 family house, where her brother and sister in law also reside.  Biological mother - Crack cocaine and alcohol use.

## 2021-11-08 NOTE — DISCHARGE NOTE BEHAVIORAL HEALTH - MEDICATION SUMMARY - MEDICATIONS TO TAKE
I will START or STAY ON the medications listed below when I get home from the hospital:    gabapentin 100 mg oral capsule  -- 1 cap(s) by mouth every 8 hours x 7 days   -- Indication: For anxiety    mirtazapine 15 mg oral tablet  -- 1 tab(s) by mouth once a day (at bedtime) x 7 days   -- Indication: For mood/anxiety

## 2021-11-09 DIAGNOSIS — F33.2 MAJOR DEPRESSIVE DISORDER, RECURRENT SEVERE WITHOUT PSYCHOTIC FEATURES: ICD-10-CM

## 2021-11-09 DIAGNOSIS — Z79.899 OTHER LONG TERM (CURRENT) DRUG THERAPY: ICD-10-CM

## 2021-11-09 DIAGNOSIS — J96.00 ACUTE RESPIRATORY FAILURE, UNSPECIFIED WHETHER WITH HYPOXIA OR HYPERCAPNIA: ICD-10-CM

## 2021-11-09 DIAGNOSIS — T43.212A POISONING BY SELECTIVE SEROTONIN AND NOREPINEPHRINE REUPTAKE INHIBITORS, INTENTIONAL SELF-HARM, INITIAL ENCOUNTER: ICD-10-CM

## 2021-11-09 DIAGNOSIS — R56.9 UNSPECIFIED CONVULSIONS: ICD-10-CM
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
## 2021-11-09 DIAGNOSIS — F41.9 ANXIETY DISORDER, UNSPECIFIED: ICD-10-CM

## 2021-11-09 DIAGNOSIS — E87.6 HYPOKALEMIA: ICD-10-CM

## 2021-11-09 DIAGNOSIS — F12.20 CANNABIS DEPENDENCE, UNCOMPLICATED: ICD-10-CM
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
## 2021-11-09 DIAGNOSIS — E83.51 HYPOCALCEMIA: ICD-10-CM

## 2021-11-09 DIAGNOSIS — T43.292A POISONING BY OTHER ANTIDEPRESSANTS, INTENTIONAL SELF-HARM, INITIAL ENCOUNTER: ICD-10-CM

## 2021-11-09 DIAGNOSIS — F60.3 BORDERLINE PERSONALITY DISORDER: ICD-10-CM

## 2021-11-09 PROCEDURE — 99231 SBSQ HOSP IP/OBS SF/LOW 25: CPT

## 2021-11-09 RX ORDER — MIRTAZAPINE 45 MG/1
1 TABLET, ORALLY DISINTEGRATING ORAL
Qty: 7 | Refills: 0
Start: 2021-11-09 | End: 2021-11-15

## 2021-11-09 RX ORDER — GABAPENTIN 400 MG/1
1 CAPSULE ORAL
Qty: 21 | Refills: 0
Start: 2021-11-09 | End: 2021-11-15

## 2021-11-09 RX ADMIN — GABAPENTIN 100 MILLIGRAM(S): 400 CAPSULE ORAL at 20:00

## 2021-11-09 RX ADMIN — GABAPENTIN 100 MILLIGRAM(S): 400 CAPSULE ORAL at 13:52

## 2021-11-09 RX ADMIN — MIRTAZAPINE 15 MILLIGRAM(S): 45 TABLET, ORALLY DISINTEGRATING ORAL at 20:00

## 2021-11-09 RX ADMIN — GABAPENTIN 100 MILLIGRAM(S): 400 CAPSULE ORAL at 06:48

## 2021-11-09 NOTE — PROGRESS NOTE BEHAVIORAL HEALTH - NSBHFUPINTERVALHXFT_PSY_A_CORE
Pt was seen, evaluated, chart reviewed.  As per nursing staff, no events overnight. On evaluation, pt presents a bit shy and anxious-looking, but otherwise well related. She reports improved mood and better sleep, having "crazy dreams, but this is not new". She reports improved anxiety, good appetite. She regrets her SA and can not explain why she OD's. She denies current SI/HI and there are no overt sx of psychosis.
Pt was seen, evaluated, chart reviewed.  As per nursing staff, no events overnight. On evaluation, pt She reports improved mood and better sleep, having "crazy dreams, but this is not new". She reports improved anxiety, good appetite. She regrets her SA and can not explain why she OD's. She denies current SI/HI and there are no overt sx of psychosis.
Pt was seen, evaluated, chart reviewed.  As per nursing staff, no events overnight. On evaluation, pt reports she is doing "better." Is compliant with medication, denies negative side effects. States she is finding medication beneficial. Reports not experiencing any anxiety. When asked if her anxiety is decreased because she is away from stressors, pt states she thought about that. However, feels more that she doesn't have stressors at home and feels that medication is keeping her "calmer." Does not want to make any changes to the medications at this time. Expressed interest in PHP and eventually wants to do the DBT program. Endorsed good sleep and appetite. Denied auditory/visual hallucinations. Denied paranoia. Denied suicidal/homicidal ideation, intent or plan. Safety plan was discussed with the patient, she is agreeable to having access only to a 7 day pill box and allowing her mother to control and keep all of her medications.     Writer spoke with mother Ashleigh, who reports that she has been speaking over the phone and visited pt yesterday. Reports pt expressed to mother that she feels "wonderful." Ashleigh feels that pt is looking "better." States pt told her that she feels less anxious and feels medications are helpful. Ashleigh is on board with plan for PHP. Has no safety concerns regarding discharge this week. She is agreeable to lock up all the pills in the house and only give pt access to 7 day supply of medication via a pill box.
Pt was seen, evaluated, chart reviewed.  As per nursing staff, no events overnight. On evaluation, pt was in her room, reading a book. Reports she is "good." No new complaints. Is compliant with medication, denies negative side effects. States her mother visited her yesterday. Mother already bough a 7 day pill box. Pt reports she is looking forward to going home tomorrow. States her anxiety has been under control. Endorsed good sleep and appetite. Denied auditory/visual hallucinations. Denied paranoia. Denied suicidal/homicidal ideation, intent or plan.
Pt was seen, evaluated, chart reviewed.  As per nursing staff, no events overnight. On evaluation, pt reports she is in the hospital because she attempted to overdose on her medications. Does not recall how many pills of what medication she took. States "it was impulsive and it was a mistake." Reports that earlier int he day, she had an argument with her mother. Her mother ended up leaving the house, pt took pills and then called her therapist. She was initially not going to tell her what she had done, however states "the therapist was nice, she told me she was proud that I didn't do anything, so I told her what I had done." Then recalls being in the hospital. Reports that she has a long standing history of suicidal thoughts, especially when upset. Reports she usually goes to [employer] and "gets a latte" which makes it better and distracts her. Reports being on same regiment of medications "for a while." Denied any changes in medications. Follows up with a therapist weekly. Reports that she knows she has an unhealthy "co-dependent relationship" with her mother. States one of her triggers is that her mother treats her like she is "8 years old." Reports being currently unemployed, sitting home most of the day with her mother and taking care of her cats. Pt also reports that "anxiety" is a strong part of her life that impacts her daily activities. Reports feeling anxious about "everything." States she had a cousin's wedding that was coming up (on Nov 6th) and that was causing her a lot of anxiety. She was also anxious about her sister in laws baby shower prior to the attempt. Endorsed trouble sleeping, especially in the hospital. Denied auditory/visual hallucinations. Denied paranoia. Denied suicidal/homicidal ideation, intent or plan. Continues to report being regretful of her suicide attempt. States she is "thankful to be alive."

## 2021-11-09 NOTE — PROGRESS NOTE BEHAVIORAL HEALTH - NSBHCHARTREVIEWVS_PSY_A_CORE FT
ICU Vital Signs Last 24 Hrs  T(C): 36.8 (09 Nov 2021 06:13), Max: 36.8 (08 Nov 2021 15:28)  T(F): 98.2 (09 Nov 2021 06:13), Max: 98.3 (08 Nov 2021 15:28)  HR: 84 (09 Nov 2021 06:13) (84 - 108)  BP: 112/60 (09 Nov 2021 06:13) (112/60 - 154/80)  BP(mean): --  ABP: --  ABP(mean): --  RR: 16 (09 Nov 2021 06:13) (16 - 16)  SpO2: --
ICU Vital Signs Last 24 Hrs  T(C): 36.4 (05 Nov 2021 08:18), Max: 36.6 (04 Nov 2021 11:00)  T(F): 97.6 (05 Nov 2021 08:18), Max: 97.9 (04 Nov 2021 11:00)  HR: 90 (05 Nov 2021 08:18) (90 - 111)  BP: 119/68 (05 Nov 2021 08:18) (116/70 - 144/90)  BP(mean): 112 (04 Nov 2021 15:20) (102 - 112)  ABP: --  ABP(mean): --  RR: 16 (05 Nov 2021 08:18) (16 - 18)  SpO2: 100% (04 Nov 2021 15:20) (100% - 100%)
Vital Signs Last 24 Hrs  T(C): 37.3 (06 Nov 2021 07:50), Max: 37.4 (06 Nov 2021 06:24)  T(F): 99.1 (06 Nov 2021 07:50), Max: 99.4 (06 Nov 2021 06:24)  HR: 85 (06 Nov 2021 07:50) (85 - 111)  BP: 108/76 (06 Nov 2021 07:50) (107/61 - 124/79)  BP(mean): --  RR: 18 (06 Nov 2021 07:50) (16 - 18)  SpO2: --
Vital Signs Last 24 Hrs  T(C): 36.8 (07 Nov 2021 16:01), Max: 37.7 (07 Nov 2021 08:09)  T(F): 98.3 (07 Nov 2021 16:01), Max: 99.9 (07 Nov 2021 08:09)  HR: 112 (07 Nov 2021 16:01) (109 - 126)  BP: 138/82 (07 Nov 2021 16:01) (117/72 - 138/82)  BP(mean): --  RR: 16 (07 Nov 2021 16:01) (16 - 16)  SpO2: --
ICU Vital Signs Last 24 Hrs  T(C): 36.8 (08 Nov 2021 08:00), Max: 36.8 (07 Nov 2021 16:01)  T(F): 98.3 (08 Nov 2021 08:00), Max: 98.3 (07 Nov 2021 16:01)  HR: 97 (08 Nov 2021 08:00) (96 - 112)  BP: 100/70 (08 Nov 2021 08:00) (100/70 - 138/82)  BP(mean): --  ABP: --  ABP(mean): --  RR: 18 (08 Nov 2021 08:00) (16 - 18)  SpO2: --

## 2021-11-09 NOTE — PROGRESS NOTE BEHAVIORAL HEALTH - RISK ASSESSMENT
RF: Impulsive, hx of suicide attempts, severity of recent suicide attempt, Depression, hx of trauma, severe anxiety elevate pt's risk for suicide  Protective factors include: family support, access to mental health services currently, compliance with medications, good rapport with therapist, residential stability

## 2021-11-09 NOTE — PROGRESS NOTE BEHAVIORAL HEALTH - PROBLEM SELECTOR PLAN 2
- Continue Gabapentin 100 mg q8h  - Vistaril 50 mg q6h prn anxiety
- Continue Gabapentin 100 mg q8h  - Vistaril 50 mg q6h prn anxiety
- Start Gabapentin 100 mg q8h  - Vistaril 50 mg q6h prn anxiety

## 2021-11-09 NOTE — PROGRESS NOTE BEHAVIORAL HEALTH - PROBLEM SELECTOR PLAN 4
- supportive psychotherapy provided  - pt encouraged to attend groups on the unit, especially [employer]

## 2021-11-09 NOTE — PROGRESS NOTE BEHAVIORAL HEALTH - PROBLEM SELECTOR PLAN 3
- No treatment indicated at this time

## 2021-11-09 NOTE — PROGRESS NOTE BEHAVIORAL HEALTH - NSBHADMITIPOBSFT_PSY_A_CORE
As per unit protocol

## 2021-11-09 NOTE — PROGRESS NOTE BEHAVIORAL HEALTH - AFFECT CONGRUENCE
Ongoing SW/CM Assessment/Plan of Care Note     See SW/CM flowsheets for goals and other objective data.    Patient/Family discharge goal (s):  Goal #1: Home discharge arranged  Goal #2: Transportation arranged or issues addressed       PT Recommendation:  Recommendation for Discharge: PT WI: Sub-acute nursing home    OT Recommendation:  Recommendations for Discharge: OT WI: Sub-acute nursing home    SLP Recommendation:       Disposition:  Planned Discharge Destination: Rehabilitation/Skilled Care    Progress note:   Pt has lab results from LP. She is awaiting results of cytology that is looking for cancer. Referrals made to SNFs in [place] with no discharge date identified. POA-HC activated with  as agent.    Discharge Destination:       SNF’s: Facility Choices Offered.      Following Provider at SNF: Dr Guy PCP     Transportation: Facility Van      Completed ADA card: N/A     Medical Appointments:       Hospital Discharge Appeal Notices: IMM     Medications will be filled by facility pharmacy and DME provided by facility    
Congruent

## 2021-11-09 NOTE — PROGRESS NOTE BEHAVIORAL HEALTH - PROBLEM SELECTOR PLAN 1
- Start Remeron 15 mg qhs for mood/anxiety/insomnia  - Trazodone 50 mg qhs prn insomnia
- Continue Remeron 15 mg qhs for mood/anxiety/insomnia  - Trazodone 50 mg qhs prn insomnia
- Continue Remeron 15 mg qhs for mood/anxiety/insomnia  - Trazodone 50 mg qhs prn insomnia
- Start Remeron 15 mg qhs for mood/anxiety/insomnia  - Trazodone 50 mg qhs prn insomnia
- Start Remeron 15 mg qhs for mood/anxiety/insomnia  - Trazodone 50 mg qhs prn insomnia

## 2021-11-09 NOTE — PROGRESS NOTE BEHAVIORAL HEALTH - SUMMARY
Ms Edmonds is a 24 yo CF, domiciled with her adopted mother, brother, currently unemployed, with PPHx of MDD, Borderline Personality disorder, Anxiety, at least 2 IPP admissions, prior suicide attempts by OD, currently in treatment with Dr Cross and Ms Avril Stokes, [facility] and no significant medical hx who is admitted to the ICU after she OD on Bupropion, Effexor and Trileptal in a possible suicide attempt. Pt was sedated and intubated during the course of hospitalization as just got extubated today. Psychiatry has been consulted to evaluate the patient for suicidality.
Ms Edmonds is a 24 yo CF, domiciled with her adopted mother, brother, currently unemployed, with PPHx of MDD, Borderline Personality disorder, Anxiety, at least 2 IPP admissions, prior suicide attempts by OD, currently in treatment with Dr Cross and Ms Avril Stokes, [facility] and no significant medical hx who is admitted to the ICU after she OD on Bupropion, Effexor and Trileptal in a possible suicide attempt. Pt was sedated and intubated during the course of hospitalization as just got extubated today. Psychiatry has been consulted to evaluate the patient for suicidality.
Ms Edmonds is a 24 yo CF, domiciled with her adopted mother, brother, currently unemployed, with PPHx of MDD, Borderline Personality disorder, Anxiety, at least 2 IPP admissions, prior suicide attempts by OD, currently in treatment with Dr Cross and Ms Avril Stokes, [facility] and no significant medical hx who is admitted to the ICU after she OD on Bupropion, Effexor and Trileptal in a possible suicide attempt. Pt was sedated and intubated during the course of hospitalization as just got extubated today. Psychiatry has been consulted to evaluate the patient for suicidality.
Ms Edmonds is a 24 yo CF, domiciled with her adopted mother, brother, currently unemployed, with PPHx of MDD, Borderline Personality disorder, Anxiety, at least 2 IPP admissions, prior suicide attempts by OD, currently in treatment with Dr Cross and Ms Avril Stokes, [facility] and no significant medical hx who is admitted to the ICU after she OD on Bupropion, Effexor and Trileptal in a possible suicide attempt. Pt was sedated and intubated during the course of hospitalization as just got extubated today. Psychiatry has been consulted to evaluate the patient for suicidality.
Ms Edmonds is a 24 yo CF, domiciled with her adopted mother, brother, currently unemployed, with PPHx of MDD, Borderline Personality disorder, Anxiety, at least 2 IPP admissions, prior suicide attempts by OD, currently in treatment with Dr Cross and Ms Avril Stokes, [facility] and no significant medical hx who is admitted to the ICU after she OD on Bupropion, Effexor and Trileptal in a possible suicide attempt. Pt was sedated and intubated during the course of hospitalization as just got extubated today. Psychiatry has been consulted to evaluate the patient for suicidality.

## 2021-11-09 NOTE — PROGRESS NOTE BEHAVIORAL HEALTH - PROBLEM SELECTOR PROBLEM 2
Generalized anxiety disorder

## 2021-11-10 VITALS
RESPIRATION RATE: 18 BRPM | DIASTOLIC BLOOD PRESSURE: 68 MMHG | SYSTOLIC BLOOD PRESSURE: 108 MMHG | HEART RATE: 112 BPM | TEMPERATURE: 97 F

## 2021-11-10 PROCEDURE — 99238 HOSP IP/OBS DSCHRG MGMT 30/<: CPT

## 2021-11-10 RX ADMIN — GABAPENTIN 100 MILLIGRAM(S): 400 CAPSULE ORAL at 08:00

## 2021-11-10 NOTE — CHART NOTE - NSCHARTNOTEFT_GEN_A_CORE
Pt was seen, evaluated, chart reviewed.  As per nursing staff, no events overnight. On evaluation, pt reports she is doing well. Remains future oriented and feels safe to return home. Is compliant with medication, denies negative side effects. Endorsed good sleep and appetite. Denied auditory/visual hallucinations. Denied paranoia. Denied suicidal/homicidal ideation, intent or plan.    Pt is for discharge today. Agreeable with outpatient follow up.

## 2021-11-10 NOTE — CHART NOTE - NSCHARTNOTEFT_GEN_A_CORE
Social Work Discharge Note:    Patient is for discharge today.   She is alert and oriented x3.  Mood is improved.  Anxiety has decreased.  Insight and judgment have improved.  Suicidal / homicidal ideation denied.      Patient will be discharged to her home in [place] with her family.  Plan is for referral to [facility].  She is aware and agreeable to same.      Discharge huddle was held prior to discharge to discuss discharge plan and referral for continued mental health treatment in outpatient setting.  No safety concerns were verbalized at that time.      Patient is aware and agreeable to discharge today.

## 2021-11-14 DIAGNOSIS — F12.180 CANNABIS ABUSE WITH CANNABIS-INDUCED ANXIETY DISORDER: ICD-10-CM

## 2021-11-14 DIAGNOSIS — F33.9 MAJOR DEPRESSIVE DISORDER, RECURRENT, UNSPECIFIED: ICD-10-CM

## 2021-11-14 DIAGNOSIS — F33.2 MAJOR DEPRESSIVE DISORDER, RECURRENT SEVERE WITHOUT PSYCHOTIC FEATURES: ICD-10-CM

## 2021-11-14 DIAGNOSIS — F41.1 GENERALIZED ANXIETY DISORDER: ICD-10-CM

## 2021-11-14 DIAGNOSIS — F60.3 BORDERLINE PERSONALITY DISORDER: ICD-10-CM

## 2021-11-14 DIAGNOSIS — Z91.51 PERSONAL HISTORY OF SUICIDAL BEHAVIOR: ICD-10-CM

## 2021-12-01 LAB
BUPROPION SERPL-MCNC: 31 NG/ML — SIGNIFICANT CHANGE UP (ref 10–100)
OH-BUPROPION SERPL-MCNC: 860 NG/ML — SIGNIFICANT CHANGE UP (ref 850–1500)

## 2021-12-14 ENCOUNTER — OUTPATIENT (OUTPATIENT)
Dept: OUTPATIENT SERVICES | Facility: HOSPITAL | Age: 26
LOS: 1 days | Discharge: HOME | End: 2021-12-14

## 2021-12-14 DIAGNOSIS — F33.2 MAJOR DEPRESSIVE DISORDER, RECURRENT SEVERE WITHOUT PSYCHOTIC FEATURES: ICD-10-CM

## 2021-12-14 DIAGNOSIS — F41.1 GENERALIZED ANXIETY DISORDER: ICD-10-CM

## 2021-12-14 DIAGNOSIS — F60.3 BORDERLINE PERSONALITY DISORDER: ICD-10-CM

## 2021-12-15 ENCOUNTER — APPOINTMENT (OUTPATIENT)
Dept: PSYCHIATRY | Facility: CLINIC | Age: 26
End: 2021-12-15

## 2021-12-15 ENCOUNTER — OUTPATIENT (OUTPATIENT)
Dept: OUTPATIENT SERVICES | Facility: HOSPITAL | Age: 26
LOS: 1 days | Discharge: HOME | End: 2021-12-15

## 2021-12-21 ENCOUNTER — OUTPATIENT (OUTPATIENT)
Dept: OUTPATIENT SERVICES | Facility: HOSPITAL | Age: 26
LOS: 1 days | Discharge: HOME | End: 2021-12-21

## 2021-12-21 ENCOUNTER — APPOINTMENT (OUTPATIENT)
Dept: PSYCHIATRY | Facility: CLINIC | Age: 26
End: 2021-12-21
Payer: MEDICAID

## 2021-12-21 DIAGNOSIS — F41.1 GENERALIZED ANXIETY DISORDER: ICD-10-CM

## 2021-12-21 DIAGNOSIS — F60.3 BORDERLINE PERSONALITY DISORDER: ICD-10-CM

## 2021-12-21 DIAGNOSIS — F33.2 MAJOR DEPRESSIVE DISORDER, RECURRENT SEVERE WITHOUT PSYCHOTIC FEATURES: ICD-10-CM

## 2021-12-21 PROCEDURE — 90792 PSYCH DIAG EVAL W/MED SRVCS: CPT | Mod: 95

## 2021-12-23 ENCOUNTER — OUTPATIENT (OUTPATIENT)
Dept: OUTPATIENT SERVICES | Facility: HOSPITAL | Age: 26
LOS: 1 days | Discharge: HOME | End: 2021-12-23

## 2021-12-23 ENCOUNTER — APPOINTMENT (OUTPATIENT)
Dept: PSYCHIATRY | Facility: CLINIC | Age: 26
End: 2021-12-23

## 2021-12-23 DIAGNOSIS — F33.1 MAJOR DEPRESSIVE DISORDER, RECURRENT, MODERATE: ICD-10-CM

## 2021-12-23 DIAGNOSIS — F60.3 BORDERLINE PERSONALITY DISORDER: ICD-10-CM

## 2021-12-23 DIAGNOSIS — F41.1 GENERALIZED ANXIETY DISORDER: ICD-10-CM

## 2021-12-28 ENCOUNTER — APPOINTMENT (OUTPATIENT)
Dept: PSYCHIATRY | Facility: CLINIC | Age: 26
End: 2021-12-28

## 2021-12-28 ENCOUNTER — NON-APPOINTMENT (OUTPATIENT)
Age: 26
End: 2021-12-28

## 2021-12-30 ENCOUNTER — OUTPATIENT (OUTPATIENT)
Dept: OUTPATIENT SERVICES | Facility: HOSPITAL | Age: 26
LOS: 1 days | Discharge: HOME | End: 2021-12-30

## 2021-12-30 ENCOUNTER — APPOINTMENT (OUTPATIENT)
Dept: PSYCHIATRY | Facility: CLINIC | Age: 26
End: 2021-12-30

## 2022-01-03 DIAGNOSIS — F41.1 GENERALIZED ANXIETY DISORDER: ICD-10-CM

## 2022-01-03 DIAGNOSIS — F33.1 MAJOR DEPRESSIVE DISORDER, RECURRENT, MODERATE: ICD-10-CM

## 2022-01-03 DIAGNOSIS — F60.3 BORDERLINE PERSONALITY DISORDER: ICD-10-CM

## 2022-01-04 ENCOUNTER — NON-APPOINTMENT (OUTPATIENT)
Age: 27
End: 2022-01-04

## 2022-01-05 ENCOUNTER — APPOINTMENT (OUTPATIENT)
Dept: PSYCHIATRY | Facility: CLINIC | Age: 27
End: 2022-01-05

## 2022-01-05 ENCOUNTER — OUTPATIENT (OUTPATIENT)
Dept: OUTPATIENT SERVICES | Facility: HOSPITAL | Age: 27
LOS: 1 days | Discharge: HOME | End: 2022-01-05

## 2022-01-05 DIAGNOSIS — F41.1 GENERALIZED ANXIETY DISORDER: ICD-10-CM

## 2022-01-05 DIAGNOSIS — F33.1 MAJOR DEPRESSIVE DISORDER, RECURRENT, MODERATE: ICD-10-CM

## 2022-01-05 DIAGNOSIS — F60.3 BORDERLINE PERSONALITY DISORDER: ICD-10-CM

## 2022-01-07 ENCOUNTER — OUTPATIENT (OUTPATIENT)
Dept: OUTPATIENT SERVICES | Facility: HOSPITAL | Age: 27
LOS: 1 days | Discharge: HOME | End: 2022-01-07

## 2022-01-07 ENCOUNTER — APPOINTMENT (OUTPATIENT)
Dept: PSYCHIATRY | Facility: CLINIC | Age: 27
End: 2022-01-07
Payer: MEDICAID

## 2022-01-07 DIAGNOSIS — F41.1 GENERALIZED ANXIETY DISORDER: ICD-10-CM

## 2022-01-07 DIAGNOSIS — F33.2 MAJOR DEPRESSIVE DISORDER, RECURRENT SEVERE WITHOUT PSYCHOTIC FEATURES: ICD-10-CM

## 2022-01-07 DIAGNOSIS — F60.3 BORDERLINE PERSONALITY DISORDER: ICD-10-CM

## 2022-01-07 PROCEDURE — 99214 OFFICE O/P EST MOD 30 MIN: CPT | Mod: 95

## 2022-01-07 RX ORDER — MIRTAZAPINE 30 MG/1
30 TABLET, FILM COATED ORAL
Qty: 30 | Refills: 0 | Status: DISCONTINUED | COMMUNITY
Start: 2021-12-21 | End: 2022-01-07

## 2022-01-12 ENCOUNTER — APPOINTMENT (OUTPATIENT)
Dept: PSYCHIATRY | Facility: CLINIC | Age: 27
End: 2022-01-12

## 2022-01-18 ENCOUNTER — OUTPATIENT (OUTPATIENT)
Dept: OUTPATIENT SERVICES | Facility: HOSPITAL | Age: 27
LOS: 1 days | Discharge: HOME | End: 2022-01-18

## 2022-01-18 ENCOUNTER — APPOINTMENT (OUTPATIENT)
Dept: PSYCHIATRY | Facility: CLINIC | Age: 27
End: 2022-01-18
Payer: MEDICAID

## 2022-01-18 DIAGNOSIS — F41.1 GENERALIZED ANXIETY DISORDER: ICD-10-CM

## 2022-01-18 DIAGNOSIS — F33.2 MAJOR DEPRESSIVE DISORDER, RECURRENT SEVERE WITHOUT PSYCHOTIC FEATURES: ICD-10-CM

## 2022-01-18 DIAGNOSIS — F60.3 BORDERLINE PERSONALITY DISORDER: ICD-10-CM

## 2022-01-18 PROCEDURE — 99214 OFFICE O/P EST MOD 30 MIN: CPT | Mod: 95

## 2022-01-19 ENCOUNTER — OUTPATIENT (OUTPATIENT)
Dept: OUTPATIENT SERVICES | Facility: HOSPITAL | Age: 27
LOS: 1 days | Discharge: HOME | End: 2022-01-19

## 2022-01-19 ENCOUNTER — APPOINTMENT (OUTPATIENT)
Dept: PSYCHIATRY | Facility: CLINIC | Age: 27
End: 2022-01-19

## 2022-01-19 DIAGNOSIS — F60.3 BORDERLINE PERSONALITY DISORDER: ICD-10-CM

## 2022-01-19 DIAGNOSIS — F41.1 GENERALIZED ANXIETY DISORDER: ICD-10-CM

## 2022-01-19 DIAGNOSIS — F33.2 MAJOR DEPRESSIVE DISORDER, RECURRENT SEVERE WITHOUT PSYCHOTIC FEATURES: ICD-10-CM

## 2022-01-21 ENCOUNTER — APPOINTMENT (OUTPATIENT)
Dept: PSYCHIATRY | Facility: CLINIC | Age: 27
End: 2022-01-21

## 2022-01-26 ENCOUNTER — OUTPATIENT (OUTPATIENT)
Dept: OUTPATIENT SERVICES | Facility: HOSPITAL | Age: 27
LOS: 1 days | Discharge: HOME | End: 2022-01-26

## 2022-01-26 ENCOUNTER — APPOINTMENT (OUTPATIENT)
Dept: PSYCHIATRY | Facility: CLINIC | Age: 27
End: 2022-01-26

## 2022-01-26 DIAGNOSIS — F33.2 MAJOR DEPRESSIVE DISORDER, RECURRENT SEVERE WITHOUT PSYCHOTIC FEATURES: ICD-10-CM

## 2022-01-26 DIAGNOSIS — F60.3 BORDERLINE PERSONALITY DISORDER: ICD-10-CM

## 2022-01-26 DIAGNOSIS — F41.1 GENERALIZED ANXIETY DISORDER: ICD-10-CM

## 2022-02-01 ENCOUNTER — OUTPATIENT (OUTPATIENT)
Dept: OUTPATIENT SERVICES | Facility: HOSPITAL | Age: 27
LOS: 1 days | Discharge: HOME | End: 2022-02-01

## 2022-02-01 ENCOUNTER — APPOINTMENT (OUTPATIENT)
Dept: PSYCHIATRY | Facility: CLINIC | Age: 27
End: 2022-02-01
Payer: MEDICAID

## 2022-02-01 DIAGNOSIS — F60.3 BORDERLINE PERSONALITY DISORDER: ICD-10-CM

## 2022-02-01 DIAGNOSIS — F41.1 GENERALIZED ANXIETY DISORDER: ICD-10-CM

## 2022-02-01 DIAGNOSIS — F33.2 MAJOR DEPRESSIVE DISORDER, RECURRENT SEVERE WITHOUT PSYCHOTIC FEATURES: ICD-10-CM

## 2022-02-01 PROCEDURE — 99214 OFFICE O/P EST MOD 30 MIN: CPT | Mod: 95

## 2022-02-01 RX ORDER — MIRTAZAPINE 7.5 MG/1
7.5 TABLET, FILM COATED ORAL
Qty: 30 | Refills: 0 | Status: DISCONTINUED | COMMUNITY
Start: 2022-01-07 | End: 2022-02-01

## 2022-02-02 ENCOUNTER — OUTPATIENT (OUTPATIENT)
Dept: OUTPATIENT SERVICES | Facility: HOSPITAL | Age: 27
LOS: 1 days | Discharge: HOME | End: 2022-02-02

## 2022-02-02 ENCOUNTER — APPOINTMENT (OUTPATIENT)
Dept: PSYCHIATRY | Facility: CLINIC | Age: 27
End: 2022-02-02

## 2022-02-02 DIAGNOSIS — F41.1 GENERALIZED ANXIETY DISORDER: ICD-10-CM

## 2022-02-02 DIAGNOSIS — F33.2 MAJOR DEPRESSIVE DISORDER, RECURRENT SEVERE WITHOUT PSYCHOTIC FEATURES: ICD-10-CM

## 2022-02-02 DIAGNOSIS — F60.3 BORDERLINE PERSONALITY DISORDER: ICD-10-CM

## 2022-02-09 ENCOUNTER — OUTPATIENT (OUTPATIENT)
Dept: OUTPATIENT SERVICES | Facility: HOSPITAL | Age: 27
LOS: 1 days | Discharge: HOME | End: 2022-02-09

## 2022-02-09 ENCOUNTER — APPOINTMENT (OUTPATIENT)
Dept: PSYCHIATRY | Facility: CLINIC | Age: 27
End: 2022-02-09

## 2022-02-09 DIAGNOSIS — F33.2 MAJOR DEPRESSIVE DISORDER, RECURRENT SEVERE WITHOUT PSYCHOTIC FEATURES: ICD-10-CM

## 2022-02-09 DIAGNOSIS — F60.3 BORDERLINE PERSONALITY DISORDER: ICD-10-CM

## 2022-02-09 DIAGNOSIS — F41.1 GENERALIZED ANXIETY DISORDER: ICD-10-CM

## 2022-02-15 ENCOUNTER — OUTPATIENT (OUTPATIENT)
Dept: OUTPATIENT SERVICES | Facility: HOSPITAL | Age: 27
LOS: 1 days | Discharge: HOME | End: 2022-02-15

## 2022-02-15 ENCOUNTER — APPOINTMENT (OUTPATIENT)
Dept: PSYCHIATRY | Facility: CLINIC | Age: 27
End: 2022-02-15
Payer: MEDICAID

## 2022-02-15 PROCEDURE — 99214 OFFICE O/P EST MOD 30 MIN: CPT | Mod: 95

## 2022-02-16 ENCOUNTER — OUTPATIENT (OUTPATIENT)
Dept: OUTPATIENT SERVICES | Facility: HOSPITAL | Age: 27
LOS: 1 days | Discharge: HOME | End: 2022-02-16

## 2022-02-16 ENCOUNTER — APPOINTMENT (OUTPATIENT)
Dept: PSYCHIATRY | Facility: CLINIC | Age: 27
End: 2022-02-16

## 2022-02-16 DIAGNOSIS — F60.3 BORDERLINE PERSONALITY DISORDER: ICD-10-CM

## 2022-02-16 DIAGNOSIS — F41.1 GENERALIZED ANXIETY DISORDER: ICD-10-CM

## 2022-02-16 DIAGNOSIS — F33.2 MAJOR DEPRESSIVE DISORDER, RECURRENT SEVERE WITHOUT PSYCHOTIC FEATURES: ICD-10-CM

## 2022-02-23 ENCOUNTER — APPOINTMENT (OUTPATIENT)
Dept: PSYCHIATRY | Facility: CLINIC | Age: 27
End: 2022-02-23

## 2022-02-23 ENCOUNTER — OUTPATIENT (OUTPATIENT)
Dept: OUTPATIENT SERVICES | Facility: HOSPITAL | Age: 27
LOS: 1 days | Discharge: HOME | End: 2022-02-23

## 2022-02-23 DIAGNOSIS — F33.2 MAJOR DEPRESSIVE DISORDER, RECURRENT SEVERE WITHOUT PSYCHOTIC FEATURES: ICD-10-CM

## 2022-02-23 DIAGNOSIS — F60.3 BORDERLINE PERSONALITY DISORDER: ICD-10-CM

## 2022-02-23 DIAGNOSIS — F41.1 GENERALIZED ANXIETY DISORDER: ICD-10-CM

## 2022-02-24 ENCOUNTER — APPOINTMENT (OUTPATIENT)
Dept: PSYCHIATRY | Facility: CLINIC | Age: 27
End: 2022-02-24

## 2022-02-24 ENCOUNTER — OUTPATIENT (OUTPATIENT)
Dept: OUTPATIENT SERVICES | Facility: HOSPITAL | Age: 27
LOS: 1 days | Discharge: HOME | End: 2022-02-24

## 2022-02-24 DIAGNOSIS — F60.3 BORDERLINE PERSONALITY DISORDER: ICD-10-CM

## 2022-02-24 DIAGNOSIS — F33.2 MAJOR DEPRESSIVE DISORDER, RECURRENT SEVERE WITHOUT PSYCHOTIC FEATURES: ICD-10-CM

## 2022-03-02 ENCOUNTER — APPOINTMENT (OUTPATIENT)
Dept: PSYCHIATRY | Facility: CLINIC | Age: 27
End: 2022-03-02

## 2022-03-02 ENCOUNTER — OUTPATIENT (OUTPATIENT)
Dept: OUTPATIENT SERVICES | Facility: HOSPITAL | Age: 27
LOS: 1 days | Discharge: HOME | End: 2022-03-02

## 2022-03-02 DIAGNOSIS — F60.3 BORDERLINE PERSONALITY DISORDER: ICD-10-CM

## 2022-03-02 DIAGNOSIS — F33.2 MAJOR DEPRESSIVE DISORDER, RECURRENT SEVERE WITHOUT PSYCHOTIC FEATURES: ICD-10-CM

## 2022-03-03 ENCOUNTER — APPOINTMENT (OUTPATIENT)
Dept: PSYCHIATRY | Facility: CLINIC | Age: 27
End: 2022-03-03
Payer: MEDICAID

## 2022-03-03 ENCOUNTER — OUTPATIENT (OUTPATIENT)
Dept: OUTPATIENT SERVICES | Facility: HOSPITAL | Age: 27
LOS: 1 days | Discharge: HOME | End: 2022-03-03

## 2022-03-03 DIAGNOSIS — F33.2 MAJOR DEPRESSIVE DISORDER, RECURRENT SEVERE WITHOUT PSYCHOTIC FEATURES: ICD-10-CM

## 2022-03-03 DIAGNOSIS — F60.3 BORDERLINE PERSONALITY DISORDER: ICD-10-CM

## 2022-03-03 PROCEDURE — 99214 OFFICE O/P EST MOD 30 MIN: CPT | Mod: 95

## 2022-03-09 ENCOUNTER — OUTPATIENT (OUTPATIENT)
Dept: OUTPATIENT SERVICES | Facility: HOSPITAL | Age: 27
LOS: 1 days | Discharge: HOME | End: 2022-03-09

## 2022-03-09 ENCOUNTER — APPOINTMENT (OUTPATIENT)
Dept: PSYCHIATRY | Facility: CLINIC | Age: 27
End: 2022-03-09

## 2022-03-09 DIAGNOSIS — F60.3 BORDERLINE PERSONALITY DISORDER: ICD-10-CM

## 2022-03-09 DIAGNOSIS — F33.2 MAJOR DEPRESSIVE DISORDER, RECURRENT SEVERE WITHOUT PSYCHOTIC FEATURES: ICD-10-CM

## 2022-03-10 ENCOUNTER — OUTPATIENT (OUTPATIENT)
Dept: OUTPATIENT SERVICES | Facility: HOSPITAL | Age: 27
LOS: 1 days | Discharge: HOME | End: 2022-03-10

## 2022-03-10 ENCOUNTER — APPOINTMENT (OUTPATIENT)
Dept: PSYCHIATRY | Facility: CLINIC | Age: 27
End: 2022-03-10

## 2022-03-10 DIAGNOSIS — F33.2 MAJOR DEPRESSIVE DISORDER, RECURRENT SEVERE WITHOUT PSYCHOTIC FEATURES: ICD-10-CM

## 2022-03-10 DIAGNOSIS — F60.3 BORDERLINE PERSONALITY DISORDER: ICD-10-CM

## 2022-03-17 ENCOUNTER — APPOINTMENT (OUTPATIENT)
Dept: PSYCHIATRY | Facility: CLINIC | Age: 27
End: 2022-03-17
Payer: MEDICAID

## 2022-03-17 ENCOUNTER — OUTPATIENT (OUTPATIENT)
Dept: OUTPATIENT SERVICES | Facility: HOSPITAL | Age: 27
LOS: 1 days | Discharge: HOME | End: 2022-03-17

## 2022-03-17 DIAGNOSIS — F60.3 BORDERLINE PERSONALITY DISORDER: ICD-10-CM

## 2022-03-17 DIAGNOSIS — F33.2 MAJOR DEPRESSIVE DISORDER, RECURRENT SEVERE WITHOUT PSYCHOTIC FEATURES: ICD-10-CM

## 2022-03-17 PROCEDURE — 99214 OFFICE O/P EST MOD 30 MIN: CPT | Mod: 95

## 2022-03-23 ENCOUNTER — APPOINTMENT (OUTPATIENT)
Dept: PSYCHIATRY | Facility: CLINIC | Age: 27
End: 2022-03-23

## 2022-03-23 ENCOUNTER — OUTPATIENT (OUTPATIENT)
Dept: OUTPATIENT SERVICES | Facility: HOSPITAL | Age: 27
LOS: 1 days | Discharge: HOME | End: 2022-03-23

## 2022-03-23 DIAGNOSIS — F33.2 MAJOR DEPRESSIVE DISORDER, RECURRENT SEVERE WITHOUT PSYCHOTIC FEATURES: ICD-10-CM

## 2022-03-23 DIAGNOSIS — F60.3 BORDERLINE PERSONALITY DISORDER: ICD-10-CM

## 2022-03-24 ENCOUNTER — OUTPATIENT (OUTPATIENT)
Dept: OUTPATIENT SERVICES | Facility: HOSPITAL | Age: 27
LOS: 1 days | Discharge: HOME | End: 2022-03-24

## 2022-03-24 ENCOUNTER — APPOINTMENT (OUTPATIENT)
Dept: PSYCHIATRY | Facility: CLINIC | Age: 27
End: 2022-03-24
Payer: MEDICAID

## 2022-03-24 DIAGNOSIS — F60.3 BORDERLINE PERSONALITY DISORDER: ICD-10-CM

## 2022-03-24 DIAGNOSIS — F33.2 MAJOR DEPRESSIVE DISORDER, RECURRENT SEVERE WITHOUT PSYCHOTIC FEATURES: ICD-10-CM

## 2022-03-24 PROCEDURE — 99214 OFFICE O/P EST MOD 30 MIN: CPT | Mod: 95

## 2022-03-30 ENCOUNTER — APPOINTMENT (OUTPATIENT)
Dept: PSYCHIATRY | Facility: CLINIC | Age: 27
End: 2022-03-30

## 2022-03-30 ENCOUNTER — OUTPATIENT (OUTPATIENT)
Dept: OUTPATIENT SERVICES | Facility: HOSPITAL | Age: 27
LOS: 1 days | Discharge: HOME | End: 2022-03-30

## 2022-03-30 DIAGNOSIS — F33.2 MAJOR DEPRESSIVE DISORDER, RECURRENT SEVERE WITHOUT PSYCHOTIC FEATURES: ICD-10-CM

## 2022-03-30 DIAGNOSIS — F60.3 BORDERLINE PERSONALITY DISORDER: ICD-10-CM

## 2022-03-31 ENCOUNTER — OUTPATIENT (OUTPATIENT)
Dept: OUTPATIENT SERVICES | Facility: HOSPITAL | Age: 27
LOS: 1 days | Discharge: HOME | End: 2022-03-31

## 2022-03-31 ENCOUNTER — APPOINTMENT (OUTPATIENT)
Dept: PSYCHIATRY | Facility: CLINIC | Age: 27
End: 2022-03-31

## 2022-03-31 DIAGNOSIS — F33.2 MAJOR DEPRESSIVE DISORDER, RECURRENT SEVERE WITHOUT PSYCHOTIC FEATURES: ICD-10-CM

## 2022-03-31 DIAGNOSIS — F60.3 BORDERLINE PERSONALITY DISORDER: ICD-10-CM

## 2022-04-06 ENCOUNTER — OUTPATIENT (OUTPATIENT)
Dept: OUTPATIENT SERVICES | Facility: HOSPITAL | Age: 27
LOS: 1 days | Discharge: HOME | End: 2022-04-06

## 2022-04-06 ENCOUNTER — APPOINTMENT (OUTPATIENT)
Dept: PSYCHIATRY | Facility: CLINIC | Age: 27
End: 2022-04-06

## 2022-04-06 DIAGNOSIS — F33.2 MAJOR DEPRESSIVE DISORDER, RECURRENT SEVERE WITHOUT PSYCHOTIC FEATURES: ICD-10-CM

## 2022-04-06 DIAGNOSIS — F60.3 BORDERLINE PERSONALITY DISORDER: ICD-10-CM

## 2022-04-07 ENCOUNTER — APPOINTMENT (OUTPATIENT)
Dept: PSYCHIATRY | Facility: CLINIC | Age: 27
End: 2022-04-07

## 2022-04-07 ENCOUNTER — OUTPATIENT (OUTPATIENT)
Dept: OUTPATIENT SERVICES | Facility: HOSPITAL | Age: 27
LOS: 1 days | Discharge: HOME | End: 2022-04-07

## 2022-04-07 DIAGNOSIS — F60.3 BORDERLINE PERSONALITY DISORDER: ICD-10-CM

## 2022-04-07 DIAGNOSIS — F33.2 MAJOR DEPRESSIVE DISORDER, RECURRENT SEVERE WITHOUT PSYCHOTIC FEATURES: ICD-10-CM

## 2022-04-07 PROCEDURE — 99214 OFFICE O/P EST MOD 30 MIN: CPT | Mod: 1L,95

## 2022-04-13 ENCOUNTER — OUTPATIENT (OUTPATIENT)
Dept: OUTPATIENT SERVICES | Facility: HOSPITAL | Age: 27
LOS: 1 days | Discharge: HOME | End: 2022-04-13

## 2022-04-13 ENCOUNTER — APPOINTMENT (OUTPATIENT)
Dept: PSYCHIATRY | Facility: CLINIC | Age: 27
End: 2022-04-13

## 2022-04-13 DIAGNOSIS — F60.3 BORDERLINE PERSONALITY DISORDER: ICD-10-CM

## 2022-04-13 DIAGNOSIS — F33.2 MAJOR DEPRESSIVE DISORDER, RECURRENT SEVERE WITHOUT PSYCHOTIC FEATURES: ICD-10-CM

## 2022-04-14 ENCOUNTER — APPOINTMENT (OUTPATIENT)
Dept: PSYCHIATRY | Facility: CLINIC | Age: 27
End: 2022-04-14

## 2022-04-14 ENCOUNTER — OUTPATIENT (OUTPATIENT)
Dept: OUTPATIENT SERVICES | Facility: HOSPITAL | Age: 27
LOS: 1 days | Discharge: HOME | End: 2022-04-14

## 2022-04-14 DIAGNOSIS — F33.2 MAJOR DEPRESSIVE DISORDER, RECURRENT SEVERE WITHOUT PSYCHOTIC FEATURES: ICD-10-CM

## 2022-04-14 DIAGNOSIS — F60.3 BORDERLINE PERSONALITY DISORDER: ICD-10-CM

## 2022-04-20 ENCOUNTER — APPOINTMENT (OUTPATIENT)
Dept: PSYCHIATRY | Facility: CLINIC | Age: 27
End: 2022-04-20

## 2022-04-20 ENCOUNTER — OUTPATIENT (OUTPATIENT)
Dept: OUTPATIENT SERVICES | Facility: HOSPITAL | Age: 27
LOS: 1 days | Discharge: HOME | End: 2022-04-20

## 2022-04-21 ENCOUNTER — EMERGENCY (EMERGENCY)
Facility: HOSPITAL | Age: 27
LOS: 0 days | Discharge: HOME | End: 2022-04-21
Attending: EMERGENCY MEDICINE | Admitting: EMERGENCY MEDICINE
Payer: MEDICAID

## 2022-04-21 ENCOUNTER — APPOINTMENT (OUTPATIENT)
Dept: PSYCHIATRY | Facility: CLINIC | Age: 27
End: 2022-04-21

## 2022-04-21 VITALS
SYSTOLIC BLOOD PRESSURE: 127 MMHG | TEMPERATURE: 96 F | DIASTOLIC BLOOD PRESSURE: 63 MMHG | OXYGEN SATURATION: 99 % | WEIGHT: 119.93 LBS | HEART RATE: 160 BPM | HEIGHT: 65 IN | RESPIRATION RATE: 18 BRPM

## 2022-04-21 VITALS — HEART RATE: 99 BPM

## 2022-04-21 DIAGNOSIS — R00.0 TACHYCARDIA, UNSPECIFIED: ICD-10-CM

## 2022-04-21 DIAGNOSIS — F12.10 CANNABIS ABUSE, UNCOMPLICATED: ICD-10-CM

## 2022-04-21 DIAGNOSIS — F60.3 BORDERLINE PERSONALITY DISORDER: ICD-10-CM

## 2022-04-21 DIAGNOSIS — R00.2 PALPITATIONS: ICD-10-CM

## 2022-04-21 DIAGNOSIS — R41.9 UNSPECIFIED SYMPTOMS AND SIGNS INVOLVING COGNITIVE FUNCTIONS AND AWARENESS: ICD-10-CM

## 2022-04-21 DIAGNOSIS — R07.89 OTHER CHEST PAIN: ICD-10-CM

## 2022-04-21 DIAGNOSIS — F33.2 MAJOR DEPRESSIVE DISORDER, RECURRENT SEVERE WITHOUT PSYCHOTIC FEATURES: ICD-10-CM

## 2022-04-21 LAB
ALBUMIN SERPL ELPH-MCNC: 4.7 G/DL — SIGNIFICANT CHANGE UP (ref 3.5–5.2)
ALP SERPL-CCNC: 52 U/L — SIGNIFICANT CHANGE UP (ref 30–115)
ALT FLD-CCNC: 11 U/L — SIGNIFICANT CHANGE UP (ref 0–41)
ANION GAP SERPL CALC-SCNC: 13 MMOL/L — SIGNIFICANT CHANGE UP (ref 7–14)
AST SERPL-CCNC: 31 U/L — SIGNIFICANT CHANGE UP (ref 0–41)
BASOPHILS # BLD AUTO: 0.05 K/UL — SIGNIFICANT CHANGE UP (ref 0–0.2)
BASOPHILS NFR BLD AUTO: 0.5 % — SIGNIFICANT CHANGE UP (ref 0–1)
BILIRUB SERPL-MCNC: <0.2 MG/DL — SIGNIFICANT CHANGE UP (ref 0.2–1.2)
BUN SERPL-MCNC: 14 MG/DL — SIGNIFICANT CHANGE UP (ref 10–20)
CALCIUM SERPL-MCNC: 9.5 MG/DL — SIGNIFICANT CHANGE UP (ref 8.5–10.1)
CHLORIDE SERPL-SCNC: 98 MMOL/L — SIGNIFICANT CHANGE UP (ref 98–110)
CO2 SERPL-SCNC: 21 MMOL/L — SIGNIFICANT CHANGE UP (ref 17–32)
CREAT SERPL-MCNC: 0.9 MG/DL — SIGNIFICANT CHANGE UP (ref 0.7–1.5)
D DIMER BLD IA.RAPID-MCNC: 1861 NG/ML DDU — HIGH (ref 0–230)
EGFR: 89 ML/MIN/1.73M2 — SIGNIFICANT CHANGE UP
EOSINOPHIL # BLD AUTO: 0.14 K/UL — SIGNIFICANT CHANGE UP (ref 0–0.7)
EOSINOPHIL NFR BLD AUTO: 1.4 % — SIGNIFICANT CHANGE UP (ref 0–8)
GLUCOSE SERPL-MCNC: 146 MG/DL — HIGH (ref 70–99)
HCG SERPL QL: NEGATIVE — SIGNIFICANT CHANGE UP
HCT VFR BLD CALC: 39.2 % — SIGNIFICANT CHANGE UP (ref 37–47)
HGB BLD-MCNC: 12.6 G/DL — SIGNIFICANT CHANGE UP (ref 12–16)
IMM GRANULOCYTES NFR BLD AUTO: 0.3 % — SIGNIFICANT CHANGE UP (ref 0.1–0.3)
LYMPHOCYTES # BLD AUTO: 4.91 K/UL — HIGH (ref 1.2–3.4)
LYMPHOCYTES # BLD AUTO: 49.6 % — SIGNIFICANT CHANGE UP (ref 20.5–51.1)
MAGNESIUM SERPL-MCNC: 1.9 MG/DL — SIGNIFICANT CHANGE UP (ref 1.8–2.4)
MCHC RBC-ENTMCNC: 28.7 PG — SIGNIFICANT CHANGE UP (ref 27–31)
MCHC RBC-ENTMCNC: 32.1 G/DL — SIGNIFICANT CHANGE UP (ref 32–37)
MCV RBC AUTO: 89.3 FL — SIGNIFICANT CHANGE UP (ref 81–99)
MONOCYTES # BLD AUTO: 0.91 K/UL — HIGH (ref 0.1–0.6)
MONOCYTES NFR BLD AUTO: 9.2 % — SIGNIFICANT CHANGE UP (ref 1.7–9.3)
NEUTROPHILS # BLD AUTO: 3.86 K/UL — SIGNIFICANT CHANGE UP (ref 1.4–6.5)
NEUTROPHILS NFR BLD AUTO: 39 % — LOW (ref 42.2–75.2)
NRBC # BLD: 0 /100 WBCS — SIGNIFICANT CHANGE UP (ref 0–0)
PLATELET # BLD AUTO: 250 K/UL — SIGNIFICANT CHANGE UP (ref 130–400)
POTASSIUM SERPL-MCNC: 6.2 MMOL/L — CRITICAL HIGH (ref 3.5–5)
POTASSIUM SERPL-SCNC: 6.2 MMOL/L — CRITICAL HIGH (ref 3.5–5)
PROT SERPL-MCNC: 7.5 G/DL — SIGNIFICANT CHANGE UP (ref 6–8)
RBC # BLD: 4.39 M/UL — SIGNIFICANT CHANGE UP (ref 4.2–5.4)
RBC # FLD: 11.9 % — SIGNIFICANT CHANGE UP (ref 11.5–14.5)
SODIUM SERPL-SCNC: 132 MMOL/L — LOW (ref 135–146)
TROPONIN T SERPL-MCNC: <0.01 NG/ML — SIGNIFICANT CHANGE UP
WBC # BLD: 9.9 K/UL — SIGNIFICANT CHANGE UP (ref 4.8–10.8)
WBC # FLD AUTO: 9.9 K/UL — SIGNIFICANT CHANGE UP (ref 4.8–10.8)

## 2022-04-21 PROCEDURE — 99285 EMERGENCY DEPT VISIT HI MDM: CPT

## 2022-04-21 PROCEDURE — 93010 ELECTROCARDIOGRAM REPORT: CPT

## 2022-04-21 PROCEDURE — 71275 CT ANGIOGRAPHY CHEST: CPT | Mod: 26,MA

## 2022-04-21 RX ORDER — SODIUM CHLORIDE 9 MG/ML
1000 INJECTION INTRAMUSCULAR; INTRAVENOUS; SUBCUTANEOUS ONCE
Refills: 0 | Status: COMPLETED | OUTPATIENT
Start: 2022-04-21 | End: 2022-04-21

## 2022-04-21 RX ADMIN — Medication 0.5 MILLIGRAM(S): at 02:04

## 2022-04-21 RX ADMIN — Medication 0.5 MILLIGRAM(S): at 01:11

## 2022-04-21 RX ADMIN — SODIUM CHLORIDE 2000 MILLILITER(S): 9 INJECTION INTRAMUSCULAR; INTRAVENOUS; SUBCUTANEOUS at 01:11

## 2022-04-21 NOTE — ED PROVIDER NOTE - PATIENT PORTAL LINK FT
You can access the FollowMyHealth Patient Portal offered by [facility] by registering at the following website: [email or web address]. By joining [employer] [facility]’s FollowMyHealth portal, you will also be able to view your health information using other applications (apps) compatible with our system.

## 2022-04-21 NOTE — ED PROVIDER NOTE - CLINICAL SUMMARY MEDICAL DECISION MAKING FREE TEXT BOX
Patient feeling much better, d dimer was elevated but CTA negative.    HR had come down to 101, then went back to 140 when patient was told about d dimer, now back to mid 90s.    Likely anxiety reaction. No signs fo sepsis, PE, clinially no signs of thyroid storm..    Advised on need to follow up with psychiatrist for consideration of use of rescue benzos for severe anxiety episodes.     Advised on monitoring of sx, return precautions, follow up.

## 2022-04-21 NOTE — ED ADULT TRIAGE NOTE - AS TEMP SITE
VIDEO ELECTROENCEPHALOGRAM REPORT      Referring provider: ALVARO Downey.    DOS:  5/9/2017 (total recording for 27 minutes).     INDICATION:  Roxana Johnson 34 y.o. female presenting with seizures.     CURRENT ANTIEPILEPTIC REGIMEN: Levetiracetam 1000 mg po bid.      TECHNIQUE: 30 channel video electroencephalogram (EEG) was performed in accordance with the international 10-20 system. The study was reviewed in bipolar and referential montages. The recording examined the patient during wakeful only state.      DESCRIPTION OF THE RECORD:  During the wakefulness, the background showed a symmetrical 12 Hz alpha activity posteriorly with amplitude of 70 mV.  There was reactivity to eye closure/opening.  A normal anterior-posterior gradient was noted with faster beta frequencies seen anteriorly.      ACTIVATION PROCEDURES:    Hyperventilation was performed by the patient for a total of 3 minutes. The technician performing the test noted good effort. No significant background changes were noted.      Intermittent Photic stimulation was performed in a stepwise fashion from 1 to 30 Hz and elicited a normal response (photic driving), most noticeable in the posterior leads.      ICTAL AND/OR INTERICTAL FINDINGS:    No focal or generalized epileptiform activity noted. No regional slowing was seen during this routine study.  No clinical events or seizures were reported or recorded during the study.      EKG: sampling of the EKG recording demonstrated sinus rhythm.        INTERPRETATION:  This is a normal video EEG recording in the awake only state.  Clinical correlation is recommended.    Note: A normal EEG does not rule out epilepsy.  If the clinical suspicion remains high for seizures, a prolonged recording to capture clinical or subclinical events may be helpful.        Juan Marx MD  Medical Director, Epilepsy and Neurodiagnostics.    Clinical  of Neurology [facility]  [employer].    Diplomate in Neurology, Epilepsy, and Electrodiagnostic Medicine.    Office: [phone]  Fax: [phone]    TUYET TAVAREZ    DD:  05/09/2017 10:10:42  DT:  05/09/2017 13:44:39    D#:  [id number]  Job#:  [id number]    cc: ONEIL JOHN   oral

## 2022-04-21 NOTE — ED PROVIDER NOTE - PHYSICAL EXAMINATION
VITAL SIGNS: I have reviewed nursing notes and confirm.  CONSTITUTIONAL: Well-developed; well-nourished; in no acute distress.  SKIN: Skin exam is warm and dry, no acute rash, good turgor  HEAD: Normocephalic; atraumatic.  EYES: PERRL, EOM intact; conjunctiva and sclera clear.  ENT: No nasal discharge; airway clear.   CARD: tachy, regular rhythm  RESP: No wheezes, rales or rhonchi.  ABD: Normal bowel sounds; soft; non-distended; non-tender; no hepatosplenomegaly.  EXT: Normal ROM. No clubbing, cyanosis or edema, no calf swelling, ttp  NEURO: Alert, oriented. Grossly unremarkable. No focal deficits.  PSYCH: Cooperative, appropriate, anxious

## 2022-04-21 NOTE — ED PROVIDER NOTE - OBJECTIVE STATEMENT
25 yo F, hx of severe anxiety, previous IPP admissions for SI/gesture, here for assessment of palpitations. Patient notes she has had a hard time with her anxiety today, no specific incidents. She smoked marijuana earlier which sometimes helps but can sometimes cause palpitations and then later on in the evening had acute onset palpitations. Notes chest pressure, no dyspnea, diaphoresis, dizziness.    No stimulant, either illegal or OTC use, takes atarax at bedtime, is on OCP. No LE edema/pain, no recent immobilization or travel, no hx of PE/DVT, no hemoptysis.

## 2022-04-22 ENCOUNTER — NON-APPOINTMENT (OUTPATIENT)
Age: 27
End: 2022-04-22

## 2022-04-27 ENCOUNTER — OUTPATIENT (OUTPATIENT)
Dept: OUTPATIENT SERVICES | Facility: HOSPITAL | Age: 27
LOS: 1 days | Discharge: HOME | End: 2022-04-27

## 2022-04-27 ENCOUNTER — APPOINTMENT (OUTPATIENT)
Dept: PSYCHIATRY | Facility: CLINIC | Age: 27
End: 2022-04-27

## 2022-04-27 DIAGNOSIS — F33.2 MAJOR DEPRESSIVE DISORDER, RECURRENT SEVERE WITHOUT PSYCHOTIC FEATURES: ICD-10-CM

## 2022-04-27 DIAGNOSIS — F60.3 BORDERLINE PERSONALITY DISORDER: ICD-10-CM

## 2022-04-28 ENCOUNTER — OUTPATIENT (OUTPATIENT)
Dept: OUTPATIENT SERVICES | Facility: HOSPITAL | Age: 27
LOS: 1 days | Discharge: HOME | End: 2022-04-28

## 2022-04-28 ENCOUNTER — APPOINTMENT (OUTPATIENT)
Dept: PSYCHIATRY | Facility: CLINIC | Age: 27
End: 2022-04-28

## 2022-04-28 DIAGNOSIS — F60.3 BORDERLINE PERSONALITY DISORDER: ICD-10-CM

## 2022-04-28 DIAGNOSIS — F33.2 MAJOR DEPRESSIVE DISORDER, RECURRENT SEVERE WITHOUT PSYCHOTIC FEATURES: ICD-10-CM

## 2022-05-04 ENCOUNTER — APPOINTMENT (OUTPATIENT)
Dept: PSYCHIATRY | Facility: CLINIC | Age: 27
End: 2022-05-04

## 2022-05-05 ENCOUNTER — OUTPATIENT (OUTPATIENT)
Dept: OUTPATIENT SERVICES | Facility: HOSPITAL | Age: 27
LOS: 1 days | Discharge: HOME | End: 2022-05-05

## 2022-05-05 ENCOUNTER — APPOINTMENT (OUTPATIENT)
Dept: PSYCHIATRY | Facility: CLINIC | Age: 27
End: 2022-05-05
Payer: MEDICAID

## 2022-05-05 DIAGNOSIS — F60.3 BORDERLINE PERSONALITY DISORDER: ICD-10-CM

## 2022-05-05 DIAGNOSIS — F33.2 MAJOR DEPRESSIVE DISORDER, RECURRENT SEVERE WITHOUT PSYCHOTIC FEATURES: ICD-10-CM

## 2022-05-05 PROCEDURE — 99214 OFFICE O/P EST MOD 30 MIN: CPT | Mod: 95

## 2022-05-06 ENCOUNTER — OUTPATIENT (OUTPATIENT)
Dept: OUTPATIENT SERVICES | Facility: HOSPITAL | Age: 27
LOS: 1 days | Discharge: HOME | End: 2022-05-06

## 2022-05-06 ENCOUNTER — APPOINTMENT (OUTPATIENT)
Dept: PSYCHIATRY | Facility: CLINIC | Age: 27
End: 2022-05-06

## 2022-05-06 DIAGNOSIS — F33.2 MAJOR DEPRESSIVE DISORDER, RECURRENT SEVERE WITHOUT PSYCHOTIC FEATURES: ICD-10-CM

## 2022-05-06 DIAGNOSIS — F60.3 BORDERLINE PERSONALITY DISORDER: ICD-10-CM

## 2022-05-12 ENCOUNTER — APPOINTMENT (OUTPATIENT)
Dept: PSYCHIATRY | Facility: CLINIC | Age: 27
End: 2022-05-12

## 2022-05-13 ENCOUNTER — APPOINTMENT (OUTPATIENT)
Dept: PSYCHIATRY | Facility: CLINIC | Age: 27
End: 2022-05-13

## 2022-05-13 ENCOUNTER — OUTPATIENT (OUTPATIENT)
Dept: OUTPATIENT SERVICES | Facility: HOSPITAL | Age: 27
LOS: 1 days | Discharge: HOME | End: 2022-05-13

## 2022-05-13 DIAGNOSIS — F33.2 MAJOR DEPRESSIVE DISORDER, RECURRENT SEVERE WITHOUT PSYCHOTIC FEATURES: ICD-10-CM

## 2022-05-13 DIAGNOSIS — F60.3 BORDERLINE PERSONALITY DISORDER: ICD-10-CM

## 2022-05-16 ENCOUNTER — APPOINTMENT (OUTPATIENT)
Dept: PSYCHIATRY | Facility: CLINIC | Age: 27
End: 2022-05-16

## 2022-05-18 ENCOUNTER — OUTPATIENT (OUTPATIENT)
Dept: OUTPATIENT SERVICES | Facility: HOSPITAL | Age: 27
LOS: 1 days | Discharge: HOME | End: 2022-05-18

## 2022-05-18 ENCOUNTER — APPOINTMENT (OUTPATIENT)
Dept: PSYCHIATRY | Facility: CLINIC | Age: 27
End: 2022-05-18

## 2022-05-18 DIAGNOSIS — F60.3 BORDERLINE PERSONALITY DISORDER: ICD-10-CM

## 2022-05-18 DIAGNOSIS — F33.2 MAJOR DEPRESSIVE DISORDER, RECURRENT SEVERE WITHOUT PSYCHOTIC FEATURES: ICD-10-CM

## 2022-05-19 ENCOUNTER — APPOINTMENT (OUTPATIENT)
Dept: PSYCHIATRY | Facility: CLINIC | Age: 27
End: 2022-05-19

## 2022-05-19 ENCOUNTER — OUTPATIENT (OUTPATIENT)
Dept: OUTPATIENT SERVICES | Facility: HOSPITAL | Age: 27
LOS: 1 days | Discharge: HOME | End: 2022-05-19

## 2022-05-19 DIAGNOSIS — F33.2 MAJOR DEPRESSIVE DISORDER, RECURRENT SEVERE WITHOUT PSYCHOTIC FEATURES: ICD-10-CM

## 2022-05-19 DIAGNOSIS — F60.3 BORDERLINE PERSONALITY DISORDER: ICD-10-CM

## 2022-05-19 PROCEDURE — 99214 OFFICE O/P EST MOD 30 MIN: CPT | Mod: 95

## 2022-05-25 ENCOUNTER — OUTPATIENT (OUTPATIENT)
Dept: OUTPATIENT SERVICES | Facility: HOSPITAL | Age: 27
LOS: 1 days | Discharge: HOME | End: 2022-05-25

## 2022-05-25 ENCOUNTER — APPOINTMENT (OUTPATIENT)
Dept: PSYCHIATRY | Facility: CLINIC | Age: 27
End: 2022-05-25

## 2022-05-25 DIAGNOSIS — F33.2 MAJOR DEPRESSIVE DISORDER, RECURRENT SEVERE WITHOUT PSYCHOTIC FEATURES: ICD-10-CM

## 2022-05-25 DIAGNOSIS — F60.3 BORDERLINE PERSONALITY DISORDER: ICD-10-CM

## 2022-05-26 ENCOUNTER — APPOINTMENT (OUTPATIENT)
Dept: PSYCHIATRY | Facility: CLINIC | Age: 27
End: 2022-05-26

## 2022-05-26 ENCOUNTER — OUTPATIENT (OUTPATIENT)
Dept: OUTPATIENT SERVICES | Facility: HOSPITAL | Age: 27
LOS: 1 days | Discharge: HOME | End: 2022-05-26

## 2022-05-26 DIAGNOSIS — F60.3 BORDERLINE PERSONALITY DISORDER: ICD-10-CM

## 2022-05-26 DIAGNOSIS — F33.2 MAJOR DEPRESSIVE DISORDER, RECURRENT SEVERE WITHOUT PSYCHOTIC FEATURES: ICD-10-CM

## 2022-06-01 ENCOUNTER — OUTPATIENT (OUTPATIENT)
Dept: OUTPATIENT SERVICES | Facility: HOSPITAL | Age: 27
LOS: 1 days | Discharge: HOME | End: 2022-06-01

## 2022-06-01 ENCOUNTER — APPOINTMENT (OUTPATIENT)
Dept: PSYCHIATRY | Facility: CLINIC | Age: 27
End: 2022-06-01

## 2022-06-01 DIAGNOSIS — F33.2 MAJOR DEPRESSIVE DISORDER, RECURRENT SEVERE WITHOUT PSYCHOTIC FEATURES: ICD-10-CM

## 2022-06-01 DIAGNOSIS — F60.3 BORDERLINE PERSONALITY DISORDER: ICD-10-CM

## 2022-06-02 ENCOUNTER — APPOINTMENT (OUTPATIENT)
Dept: PSYCHIATRY | Facility: CLINIC | Age: 27
End: 2022-06-02

## 2022-06-02 ENCOUNTER — OUTPATIENT (OUTPATIENT)
Dept: OUTPATIENT SERVICES | Facility: HOSPITAL | Age: 27
LOS: 1 days | Discharge: HOME | End: 2022-06-02

## 2022-06-02 DIAGNOSIS — F33.2 MAJOR DEPRESSIVE DISORDER, RECURRENT SEVERE WITHOUT PSYCHOTIC FEATURES: ICD-10-CM

## 2022-06-02 DIAGNOSIS — F60.3 BORDERLINE PERSONALITY DISORDER: ICD-10-CM

## 2022-06-09 ENCOUNTER — OUTPATIENT (OUTPATIENT)
Dept: OUTPATIENT SERVICES | Facility: HOSPITAL | Age: 27
LOS: 1 days | Discharge: HOME | End: 2022-06-09

## 2022-06-09 ENCOUNTER — APPOINTMENT (OUTPATIENT)
Dept: PSYCHIATRY | Facility: CLINIC | Age: 27
End: 2022-06-09

## 2022-06-09 DIAGNOSIS — F60.3 BORDERLINE PERSONALITY DISORDER: ICD-10-CM

## 2022-06-09 DIAGNOSIS — F33.2 MAJOR DEPRESSIVE DISORDER, RECURRENT SEVERE WITHOUT PSYCHOTIC FEATURES: ICD-10-CM

## 2022-06-15 ENCOUNTER — OUTPATIENT (OUTPATIENT)
Dept: OUTPATIENT SERVICES | Facility: HOSPITAL | Age: 27
LOS: 1 days | Discharge: HOME | End: 2022-06-15

## 2022-06-15 ENCOUNTER — APPOINTMENT (OUTPATIENT)
Dept: PSYCHIATRY | Facility: CLINIC | Age: 27
End: 2022-06-15

## 2022-06-15 DIAGNOSIS — F33.2 MAJOR DEPRESSIVE DISORDER, RECURRENT SEVERE WITHOUT PSYCHOTIC FEATURES: ICD-10-CM

## 2022-06-15 DIAGNOSIS — F60.3 BORDERLINE PERSONALITY DISORDER: ICD-10-CM

## 2022-06-16 ENCOUNTER — APPOINTMENT (OUTPATIENT)
Dept: PSYCHIATRY | Facility: CLINIC | Age: 27
End: 2022-06-16

## 2022-06-16 ENCOUNTER — OUTPATIENT (OUTPATIENT)
Dept: OUTPATIENT SERVICES | Facility: HOSPITAL | Age: 27
LOS: 1 days | Discharge: HOME | End: 2022-06-16

## 2022-06-16 DIAGNOSIS — F33.2 MAJOR DEPRESSIVE DISORDER, RECURRENT SEVERE WITHOUT PSYCHOTIC FEATURES: ICD-10-CM

## 2022-06-16 DIAGNOSIS — F60.3 BORDERLINE PERSONALITY DISORDER: ICD-10-CM

## 2022-06-16 PROCEDURE — 99214 OFFICE O/P EST MOD 30 MIN: CPT | Mod: 95

## 2022-06-16 RX ORDER — HYDROXYZINE HYDROCHLORIDE 25 MG/1
25 TABLET ORAL 3 TIMES DAILY
Qty: 90 | Refills: 1 | Status: DISCONTINUED | COMMUNITY
Start: 2022-02-15 | End: 2022-06-16

## 2022-06-16 RX ORDER — GABAPENTIN 100 MG/1
100 CAPSULE ORAL TWICE DAILY
Qty: 60 | Refills: 1 | Status: DISCONTINUED | COMMUNITY
Start: 2021-12-21 | End: 2022-06-16

## 2022-06-16 RX ORDER — BUPROPION HYDROCHLORIDE 100 MG/1
100 TABLET, FILM COATED ORAL DAILY
Qty: 30 | Refills: 1 | Status: DISCONTINUED | COMMUNITY
Start: 2022-01-07 | End: 2022-06-16

## 2022-06-21 ENCOUNTER — APPOINTMENT (OUTPATIENT)
Dept: PSYCHIATRY | Facility: CLINIC | Age: 27
End: 2022-06-21

## 2022-06-21 ENCOUNTER — OUTPATIENT (OUTPATIENT)
Dept: OUTPATIENT SERVICES | Facility: HOSPITAL | Age: 27
LOS: 1 days | Discharge: HOME | End: 2022-06-21

## 2022-06-21 DIAGNOSIS — F60.3 BORDERLINE PERSONALITY DISORDER: ICD-10-CM

## 2022-06-21 DIAGNOSIS — F33.2 MAJOR DEPRESSIVE DISORDER, RECURRENT SEVERE WITHOUT PSYCHOTIC FEATURES: ICD-10-CM

## 2022-06-23 ENCOUNTER — OUTPATIENT (OUTPATIENT)
Dept: OUTPATIENT SERVICES | Facility: HOSPITAL | Age: 27
LOS: 1 days | Discharge: HOME | End: 2022-06-23

## 2022-06-23 ENCOUNTER — APPOINTMENT (OUTPATIENT)
Dept: PSYCHIATRY | Facility: CLINIC | Age: 27
End: 2022-06-23

## 2022-06-23 DIAGNOSIS — F33.2 MAJOR DEPRESSIVE DISORDER, RECURRENT SEVERE WITHOUT PSYCHOTIC FEATURES: ICD-10-CM

## 2022-06-23 DIAGNOSIS — F60.3 BORDERLINE PERSONALITY DISORDER: ICD-10-CM

## 2022-06-29 ENCOUNTER — OUTPATIENT (OUTPATIENT)
Dept: OUTPATIENT SERVICES | Facility: HOSPITAL | Age: 27
LOS: 1 days | Discharge: HOME | End: 2022-06-29

## 2022-06-29 ENCOUNTER — APPOINTMENT (OUTPATIENT)
Dept: PSYCHIATRY | Facility: CLINIC | Age: 27
End: 2022-06-29

## 2022-06-29 DIAGNOSIS — F60.3 BORDERLINE PERSONALITY DISORDER: ICD-10-CM

## 2022-06-29 DIAGNOSIS — F33.2 MAJOR DEPRESSIVE DISORDER, RECURRENT SEVERE WITHOUT PSYCHOTIC FEATURES: ICD-10-CM

## 2022-06-30 ENCOUNTER — APPOINTMENT (OUTPATIENT)
Dept: PSYCHIATRY | Facility: CLINIC | Age: 27
End: 2022-06-30

## 2022-06-30 ENCOUNTER — OUTPATIENT (OUTPATIENT)
Dept: OUTPATIENT SERVICES | Facility: HOSPITAL | Age: 27
LOS: 1 days | Discharge: HOME | End: 2022-06-30

## 2022-06-30 DIAGNOSIS — F33.2 MAJOR DEPRESSIVE DISORDER, RECURRENT SEVERE WITHOUT PSYCHOTIC FEATURES: ICD-10-CM

## 2022-06-30 DIAGNOSIS — F60.3 BORDERLINE PERSONALITY DISORDER: ICD-10-CM

## 2022-07-06 ENCOUNTER — OUTPATIENT (OUTPATIENT)
Dept: OUTPATIENT SERVICES | Facility: HOSPITAL | Age: 27
LOS: 1 days | Discharge: HOME | End: 2022-07-06

## 2022-07-06 ENCOUNTER — APPOINTMENT (OUTPATIENT)
Dept: PSYCHIATRY | Facility: CLINIC | Age: 27
End: 2022-07-06

## 2022-07-06 DIAGNOSIS — F33.2 MAJOR DEPRESSIVE DISORDER, RECURRENT SEVERE WITHOUT PSYCHOTIC FEATURES: ICD-10-CM

## 2022-07-06 DIAGNOSIS — F60.3 BORDERLINE PERSONALITY DISORDER: ICD-10-CM

## 2022-07-07 ENCOUNTER — OUTPATIENT (OUTPATIENT)
Dept: OUTPATIENT SERVICES | Facility: HOSPITAL | Age: 27
LOS: 1 days | Discharge: HOME | End: 2022-07-07

## 2022-07-07 ENCOUNTER — APPOINTMENT (OUTPATIENT)
Dept: PSYCHIATRY | Facility: CLINIC | Age: 27
End: 2022-07-07

## 2022-07-07 DIAGNOSIS — F33.2 MAJOR DEPRESSIVE DISORDER, RECURRENT SEVERE WITHOUT PSYCHOTIC FEATURES: ICD-10-CM

## 2022-07-07 DIAGNOSIS — F60.3 BORDERLINE PERSONALITY DISORDER: ICD-10-CM

## 2022-07-12 ENCOUNTER — OUTPATIENT (OUTPATIENT)
Dept: OUTPATIENT SERVICES | Facility: HOSPITAL | Age: 27
LOS: 1 days | Discharge: HOME | End: 2022-07-12

## 2022-07-12 ENCOUNTER — APPOINTMENT (OUTPATIENT)
Dept: PSYCHIATRY | Facility: CLINIC | Age: 27
End: 2022-07-12

## 2022-07-12 DIAGNOSIS — F33.2 MAJOR DEPRESSIVE DISORDER, RECURRENT SEVERE WITHOUT PSYCHOTIC FEATURES: ICD-10-CM

## 2022-07-12 DIAGNOSIS — F60.3 BORDERLINE PERSONALITY DISORDER: ICD-10-CM

## 2022-07-14 ENCOUNTER — APPOINTMENT (OUTPATIENT)
Dept: PSYCHIATRY | Facility: CLINIC | Age: 27
End: 2022-07-14

## 2022-07-14 ENCOUNTER — OUTPATIENT (OUTPATIENT)
Dept: OUTPATIENT SERVICES | Facility: HOSPITAL | Age: 27
LOS: 1 days | Discharge: HOME | End: 2022-07-14

## 2022-07-14 DIAGNOSIS — F33.2 MAJOR DEPRESSIVE DISORDER, RECURRENT SEVERE WITHOUT PSYCHOTIC FEATURES: ICD-10-CM

## 2022-07-14 DIAGNOSIS — F60.3 BORDERLINE PERSONALITY DISORDER: ICD-10-CM

## 2022-07-20 ENCOUNTER — APPOINTMENT (OUTPATIENT)
Dept: PSYCHIATRY | Facility: CLINIC | Age: 27
End: 2022-07-20

## 2022-07-20 ENCOUNTER — OUTPATIENT (OUTPATIENT)
Dept: OUTPATIENT SERVICES | Facility: HOSPITAL | Age: 27
LOS: 1 days | Discharge: HOME | End: 2022-07-20

## 2022-07-20 DIAGNOSIS — F60.3 BORDERLINE PERSONALITY DISORDER: ICD-10-CM

## 2022-07-20 DIAGNOSIS — F33.2 MAJOR DEPRESSIVE DISORDER, RECURRENT SEVERE WITHOUT PSYCHOTIC FEATURES: ICD-10-CM

## 2022-07-21 ENCOUNTER — OUTPATIENT (OUTPATIENT)
Dept: OUTPATIENT SERVICES | Facility: HOSPITAL | Age: 27
LOS: 1 days | Discharge: HOME | End: 2022-07-21

## 2022-07-21 ENCOUNTER — APPOINTMENT (OUTPATIENT)
Dept: PSYCHIATRY | Facility: CLINIC | Age: 27
End: 2022-07-21

## 2022-07-21 DIAGNOSIS — F33.2 MAJOR DEPRESSIVE DISORDER, RECURRENT SEVERE WITHOUT PSYCHOTIC FEATURES: ICD-10-CM

## 2022-07-21 DIAGNOSIS — F60.3 BORDERLINE PERSONALITY DISORDER: ICD-10-CM

## 2022-07-25 ENCOUNTER — APPOINTMENT (OUTPATIENT)
Dept: PSYCHIATRY | Facility: CLINIC | Age: 27
End: 2022-07-25

## 2022-07-25 ENCOUNTER — OUTPATIENT (OUTPATIENT)
Dept: OUTPATIENT SERVICES | Facility: HOSPITAL | Age: 27
LOS: 1 days | Discharge: HOME | End: 2022-07-25

## 2022-07-25 DIAGNOSIS — F60.3 BORDERLINE PERSONALITY DISORDER: ICD-10-CM

## 2022-07-25 DIAGNOSIS — F33.2 MAJOR DEPRESSIVE DISORDER, RECURRENT SEVERE WITHOUT PSYCHOTIC FEATURES: ICD-10-CM

## 2022-07-28 ENCOUNTER — APPOINTMENT (OUTPATIENT)
Dept: PSYCHIATRY | Facility: CLINIC | Age: 27
End: 2022-07-28

## 2022-07-28 ENCOUNTER — OUTPATIENT (OUTPATIENT)
Dept: OUTPATIENT SERVICES | Facility: HOSPITAL | Age: 27
LOS: 1 days | Discharge: HOME | End: 2022-07-28

## 2022-07-28 DIAGNOSIS — F60.3 BORDERLINE PERSONALITY DISORDER: ICD-10-CM

## 2022-07-28 DIAGNOSIS — F33.2 MAJOR DEPRESSIVE DISORDER, RECURRENT SEVERE WITHOUT PSYCHOTIC FEATURES: ICD-10-CM

## 2022-08-02 ENCOUNTER — OUTPATIENT (OUTPATIENT)
Dept: OUTPATIENT SERVICES | Facility: HOSPITAL | Age: 27
LOS: 1 days | Discharge: HOME | End: 2022-08-02

## 2022-08-02 ENCOUNTER — APPOINTMENT (OUTPATIENT)
Dept: PSYCHIATRY | Facility: CLINIC | Age: 27
End: 2022-08-02

## 2022-08-02 DIAGNOSIS — F33.2 MAJOR DEPRESSIVE DISORDER, RECURRENT SEVERE WITHOUT PSYCHOTIC FEATURES: ICD-10-CM

## 2022-08-02 DIAGNOSIS — F60.3 BORDERLINE PERSONALITY DISORDER: ICD-10-CM

## 2022-08-03 ENCOUNTER — APPOINTMENT (OUTPATIENT)
Dept: PSYCHIATRY | Facility: CLINIC | Age: 27
End: 2022-08-03

## 2022-08-03 ENCOUNTER — OUTPATIENT (OUTPATIENT)
Dept: OUTPATIENT SERVICES | Facility: HOSPITAL | Age: 27
LOS: 1 days | Discharge: HOME | End: 2022-08-03

## 2022-08-03 DIAGNOSIS — F60.3 BORDERLINE PERSONALITY DISORDER: ICD-10-CM

## 2022-08-03 DIAGNOSIS — F33.2 MAJOR DEPRESSIVE DISORDER, RECURRENT SEVERE WITHOUT PSYCHOTIC FEATURES: ICD-10-CM

## 2022-08-03 PROCEDURE — 99214 OFFICE O/P EST MOD 30 MIN: CPT | Mod: 1L

## 2022-08-04 ENCOUNTER — OUTPATIENT (OUTPATIENT)
Dept: OUTPATIENT SERVICES | Facility: HOSPITAL | Age: 27
LOS: 1 days | Discharge: HOME | End: 2022-08-04

## 2022-08-04 ENCOUNTER — APPOINTMENT (OUTPATIENT)
Dept: PSYCHIATRY | Facility: CLINIC | Age: 27
End: 2022-08-04

## 2022-08-04 DIAGNOSIS — F60.3 BORDERLINE PERSONALITY DISORDER: ICD-10-CM

## 2022-08-04 DIAGNOSIS — F33.2 MAJOR DEPRESSIVE DISORDER, RECURRENT SEVERE WITHOUT PSYCHOTIC FEATURES: ICD-10-CM

## 2022-08-10 ENCOUNTER — APPOINTMENT (OUTPATIENT)
Dept: PSYCHIATRY | Facility: CLINIC | Age: 27
End: 2022-08-10

## 2022-08-10 ENCOUNTER — OUTPATIENT (OUTPATIENT)
Dept: OUTPATIENT SERVICES | Facility: HOSPITAL | Age: 27
LOS: 1 days | Discharge: HOME | End: 2022-08-10

## 2022-08-10 DIAGNOSIS — F33.2 MAJOR DEPRESSIVE DISORDER, RECURRENT SEVERE WITHOUT PSYCHOTIC FEATURES: ICD-10-CM

## 2022-08-10 DIAGNOSIS — F60.3 BORDERLINE PERSONALITY DISORDER: ICD-10-CM

## 2022-08-16 ENCOUNTER — APPOINTMENT (OUTPATIENT)
Dept: PSYCHIATRY | Facility: CLINIC | Age: 27
End: 2022-08-16

## 2022-08-18 ENCOUNTER — APPOINTMENT (OUTPATIENT)
Dept: PSYCHIATRY | Facility: CLINIC | Age: 27
End: 2022-08-18

## 2022-08-18 ENCOUNTER — OUTPATIENT (OUTPATIENT)
Dept: OUTPATIENT SERVICES | Facility: HOSPITAL | Age: 27
LOS: 1 days | Discharge: HOME | End: 2022-08-18

## 2022-08-18 DIAGNOSIS — F60.3 BORDERLINE PERSONALITY DISORDER: ICD-10-CM

## 2022-08-18 DIAGNOSIS — F33.2 MAJOR DEPRESSIVE DISORDER, RECURRENT SEVERE WITHOUT PSYCHOTIC FEATURES: ICD-10-CM

## 2022-08-19 ENCOUNTER — OUTPATIENT (OUTPATIENT)
Dept: OUTPATIENT SERVICES | Facility: HOSPITAL | Age: 27
LOS: 1 days | Discharge: HOME | End: 2022-08-19

## 2022-08-19 ENCOUNTER — APPOINTMENT (OUTPATIENT)
Dept: PSYCHIATRY | Facility: CLINIC | Age: 27
End: 2022-08-19

## 2022-08-19 DIAGNOSIS — F33.2 MAJOR DEPRESSIVE DISORDER, RECURRENT SEVERE WITHOUT PSYCHOTIC FEATURES: ICD-10-CM

## 2022-08-19 DIAGNOSIS — F60.3 BORDERLINE PERSONALITY DISORDER: ICD-10-CM

## 2022-08-24 ENCOUNTER — APPOINTMENT (OUTPATIENT)
Dept: PSYCHIATRY | Facility: CLINIC | Age: 27
End: 2022-08-24

## 2022-08-24 ENCOUNTER — OUTPATIENT (OUTPATIENT)
Dept: OUTPATIENT SERVICES | Facility: HOSPITAL | Age: 27
LOS: 1 days | Discharge: HOME | End: 2022-08-24

## 2022-08-24 DIAGNOSIS — F60.3 BORDERLINE PERSONALITY DISORDER: ICD-10-CM

## 2022-08-24 DIAGNOSIS — F33.2 MAJOR DEPRESSIVE DISORDER, RECURRENT SEVERE WITHOUT PSYCHOTIC FEATURES: ICD-10-CM

## 2022-08-25 ENCOUNTER — OUTPATIENT (OUTPATIENT)
Dept: OUTPATIENT SERVICES | Facility: HOSPITAL | Age: 27
LOS: 1 days | Discharge: HOME | End: 2022-08-25

## 2022-08-25 ENCOUNTER — APPOINTMENT (OUTPATIENT)
Dept: PSYCHIATRY | Facility: CLINIC | Age: 27
End: 2022-08-25

## 2022-08-25 DIAGNOSIS — F60.3 BORDERLINE PERSONALITY DISORDER: ICD-10-CM

## 2022-08-25 DIAGNOSIS — F33.2 MAJOR DEPRESSIVE DISORDER, RECURRENT SEVERE WITHOUT PSYCHOTIC FEATURES: ICD-10-CM

## 2022-08-31 ENCOUNTER — OUTPATIENT (OUTPATIENT)
Dept: OUTPATIENT SERVICES | Facility: HOSPITAL | Age: 27
LOS: 1 days | Discharge: HOME | End: 2022-08-31

## 2022-08-31 ENCOUNTER — APPOINTMENT (OUTPATIENT)
Dept: PSYCHIATRY | Facility: CLINIC | Age: 27
End: 2022-08-31

## 2022-08-31 DIAGNOSIS — F60.3 BORDERLINE PERSONALITY DISORDER: ICD-10-CM

## 2022-08-31 DIAGNOSIS — F33.2 MAJOR DEPRESSIVE DISORDER, RECURRENT SEVERE WITHOUT PSYCHOTIC FEATURES: ICD-10-CM

## 2022-09-01 ENCOUNTER — APPOINTMENT (OUTPATIENT)
Dept: PSYCHIATRY | Facility: CLINIC | Age: 27
End: 2022-09-01

## 2022-09-06 ENCOUNTER — APPOINTMENT (OUTPATIENT)
Dept: PSYCHIATRY | Facility: CLINIC | Age: 27
End: 2022-09-06

## 2022-09-06 ENCOUNTER — OUTPATIENT (OUTPATIENT)
Dept: OUTPATIENT SERVICES | Facility: HOSPITAL | Age: 27
LOS: 1 days | Discharge: HOME | End: 2022-09-06

## 2022-09-06 DIAGNOSIS — F60.3 BORDERLINE PERSONALITY DISORDER: ICD-10-CM

## 2022-09-06 DIAGNOSIS — F33.2 MAJOR DEPRESSIVE DISORDER, RECURRENT SEVERE WITHOUT PSYCHOTIC FEATURES: ICD-10-CM

## 2022-09-07 ENCOUNTER — APPOINTMENT (OUTPATIENT)
Dept: PSYCHIATRY | Facility: CLINIC | Age: 27
End: 2022-09-07

## 2022-09-08 ENCOUNTER — OUTPATIENT (OUTPATIENT)
Dept: OUTPATIENT SERVICES | Facility: HOSPITAL | Age: 27
LOS: 1 days | Discharge: HOME | End: 2022-09-08

## 2022-09-08 ENCOUNTER — APPOINTMENT (OUTPATIENT)
Dept: PSYCHIATRY | Facility: CLINIC | Age: 27
End: 2022-09-08

## 2022-09-08 DIAGNOSIS — F60.3 BORDERLINE PERSONALITY DISORDER: ICD-10-CM

## 2022-09-08 DIAGNOSIS — F33.2 MAJOR DEPRESSIVE DISORDER, RECURRENT SEVERE WITHOUT PSYCHOTIC FEATURES: ICD-10-CM

## 2022-09-12 ENCOUNTER — APPOINTMENT (OUTPATIENT)
Dept: PSYCHIATRY | Facility: CLINIC | Age: 27
End: 2022-09-12

## 2022-09-12 ENCOUNTER — OUTPATIENT (OUTPATIENT)
Dept: OUTPATIENT SERVICES | Facility: HOSPITAL | Age: 27
LOS: 1 days | Discharge: HOME | End: 2022-09-12

## 2022-09-12 DIAGNOSIS — F60.3 BORDERLINE PERSONALITY DISORDER: ICD-10-CM

## 2022-09-12 DIAGNOSIS — F33.2 MAJOR DEPRESSIVE DISORDER, RECURRENT SEVERE WITHOUT PSYCHOTIC FEATURES: ICD-10-CM

## 2022-09-12 PROCEDURE — 99214 OFFICE O/P EST MOD 30 MIN: CPT | Mod: 95

## 2022-09-12 RX ORDER — HYDROXYZINE HYDROCHLORIDE 50 MG/1
50 TABLET ORAL
Qty: 30 | Refills: 0 | Status: DISCONTINUED | COMMUNITY
Start: 2022-06-16 | End: 2022-09-12

## 2022-09-14 ENCOUNTER — APPOINTMENT (OUTPATIENT)
Dept: PSYCHIATRY | Facility: CLINIC | Age: 27
End: 2022-09-14

## 2022-09-14 ENCOUNTER — OUTPATIENT (OUTPATIENT)
Dept: OUTPATIENT SERVICES | Facility: HOSPITAL | Age: 27
LOS: 1 days | Discharge: HOME | End: 2022-09-14

## 2022-09-14 DIAGNOSIS — F60.3 BORDERLINE PERSONALITY DISORDER: ICD-10-CM

## 2022-09-14 DIAGNOSIS — F33.2 MAJOR DEPRESSIVE DISORDER, RECURRENT SEVERE WITHOUT PSYCHOTIC FEATURES: ICD-10-CM

## 2022-09-15 ENCOUNTER — OUTPATIENT (OUTPATIENT)
Dept: OUTPATIENT SERVICES | Facility: HOSPITAL | Age: 27
LOS: 1 days | Discharge: HOME | End: 2022-09-15

## 2022-09-15 ENCOUNTER — APPOINTMENT (OUTPATIENT)
Dept: PSYCHIATRY | Facility: CLINIC | Age: 27
End: 2022-09-15

## 2022-09-15 DIAGNOSIS — F60.3 BORDERLINE PERSONALITY DISORDER: ICD-10-CM

## 2022-09-15 DIAGNOSIS — F33.2 MAJOR DEPRESSIVE DISORDER, RECURRENT SEVERE WITHOUT PSYCHOTIC FEATURES: ICD-10-CM

## 2022-09-21 ENCOUNTER — OUTPATIENT (OUTPATIENT)
Dept: OUTPATIENT SERVICES | Facility: HOSPITAL | Age: 27
LOS: 1 days | Discharge: HOME | End: 2022-09-21

## 2022-09-21 ENCOUNTER — APPOINTMENT (OUTPATIENT)
Dept: PSYCHIATRY | Facility: CLINIC | Age: 27
End: 2022-09-21

## 2022-09-21 DIAGNOSIS — F33.2 MAJOR DEPRESSIVE DISORDER, RECURRENT SEVERE WITHOUT PSYCHOTIC FEATURES: ICD-10-CM

## 2022-09-21 DIAGNOSIS — F60.3 BORDERLINE PERSONALITY DISORDER: ICD-10-CM

## 2022-09-22 ENCOUNTER — APPOINTMENT (OUTPATIENT)
Dept: PSYCHIATRY | Facility: CLINIC | Age: 27
End: 2022-09-22

## 2022-09-28 ENCOUNTER — APPOINTMENT (OUTPATIENT)
Dept: PSYCHIATRY | Facility: CLINIC | Age: 27
End: 2022-09-28

## 2022-09-28 ENCOUNTER — OUTPATIENT (OUTPATIENT)
Dept: OUTPATIENT SERVICES | Facility: HOSPITAL | Age: 27
LOS: 1 days | Discharge: HOME | End: 2022-09-28

## 2022-09-28 DIAGNOSIS — F33.2 MAJOR DEPRESSIVE DISORDER, RECURRENT SEVERE WITHOUT PSYCHOTIC FEATURES: ICD-10-CM

## 2022-09-28 DIAGNOSIS — F60.3 BORDERLINE PERSONALITY DISORDER: ICD-10-CM

## 2022-09-29 ENCOUNTER — OUTPATIENT (OUTPATIENT)
Dept: OUTPATIENT SERVICES | Facility: HOSPITAL | Age: 27
LOS: 1 days | Discharge: HOME | End: 2022-09-29

## 2022-09-29 ENCOUNTER — APPOINTMENT (OUTPATIENT)
Dept: PSYCHIATRY | Facility: CLINIC | Age: 27
End: 2022-09-29

## 2022-09-29 DIAGNOSIS — F33.2 MAJOR DEPRESSIVE DISORDER, RECURRENT SEVERE WITHOUT PSYCHOTIC FEATURES: ICD-10-CM

## 2022-09-29 DIAGNOSIS — F60.3 BORDERLINE PERSONALITY DISORDER: ICD-10-CM

## 2022-10-05 ENCOUNTER — APPOINTMENT (OUTPATIENT)
Dept: PSYCHIATRY | Facility: CLINIC | Age: 27
End: 2022-10-05

## 2022-10-05 ENCOUNTER — OUTPATIENT (OUTPATIENT)
Dept: OUTPATIENT SERVICES | Facility: HOSPITAL | Age: 27
LOS: 1 days | Discharge: HOME | End: 2022-10-05

## 2022-10-05 DIAGNOSIS — F33.2 MAJOR DEPRESSIVE DISORDER, RECURRENT SEVERE WITHOUT PSYCHOTIC FEATURES: ICD-10-CM

## 2022-10-05 DIAGNOSIS — F60.3 BORDERLINE PERSONALITY DISORDER: ICD-10-CM

## 2022-10-05 NOTE — REASON FOR VISIT
[Home] : at home, [unfilled] , at the time of the visit. [Medical Office: ([facility])___] : at the medical office located in  [Self] : self [Patient] : Patient [FreeTextEntry1] : Continuation of mental health treatment (as active DBT program participant).

## 2022-10-05 NOTE — END OF VISIT
[Duration of Psychotherapy Visit (minutes spent in synchronous communication): ____] : Duration of Psychotherapy Visit (minutes spent in synchronous communication): [unfilled] [Individual Psychotherapy for 38-52 minutes] : Individual Psychotherapy for 38 - 52 minutes [Teletherapy Service Provided] : The services provided in this session were delivered via tele-therapy [Licensed Clinician] : Licensed Clinician

## 2022-10-05 NOTE — PLAN
[Dialectical Behavior Therapy] : Dialectical Behavior Therapy  [Psychoeducation] : Psychoeducation  [Skills training (all types)] : Skills training (all types)  [Recommended Frequency of Visits: ____] : Recommended frequency of visits: [unfilled] [Return in ____ week(s)] : Return in [unfilled] week(s) [FreeTextEntry2] : GOAL # 1 OBJECTIVE- 1: Desirae will learn/ implement 3 skills to tolerate unwanted emotions/ urges to prevent emotional intensity from increasing/ mitigate crisis daily. \par Service Description/ Modality: Skills Group/ Individual therapy/ Skills Coaching \par Frequency: Weekly skills group sessions and weekly individual therapy sessions. \par Responsible provider: ERIC BETHEA \par Interventions: \par -Clinicians will provide a validating environment promoting a nonjudgmental atmosphere of self and an understanding of self along with understanding of dialectics in acceptance and change. \par -Clinicians will conduct weekly psycho-educational skills group on: distress tolerance module, review of mindfulness skills, introduction to emotional regulation module. \par -Clinicians will shape and reinforce skillful behaviors for client to generalize in their environment i.e. Homework review, role playing, etc. \par \par GOAL # 1 OBJECTIVE: 2: Desirae will implement mindfulness skills with goal of being in the present moment. She will utilize grounding techniques and other skills if aware of being in the future. \par Service Description/ Modality: Skills Group/ Individual therapy/ Skills Coaching \par Frequency: Weekly skills group sessions and weekly individual therapy sessions. \par Responsible provider: ERIC BETHEA \par Interventions: \par -Clinician will provide shaping, reinforcing for mindfulness skills in sessions. \par -Clinician will explore examples of mindfulness skills practice along with outcomes. \par -Clinician will conduct mindfulness exercises at the start of each skills group to promote and reinforce skills concepts/ effectiveness. \par \par GOAL # 1 OBJECTIVE-3: Desirae will contact therapist if emotional intensity is rated at a 3 or higher for skills coaching. \par Service Description/ Modality: Skills Coaching \par Frequency: As indicated based on emotional intensity/ difficulty problem-solving/ implementing skills \par Responsible provider: ERIC BETHEA \par -Clinician will offer/ encourage use of skills/ crisis coaching when level of emotional intensity exceeds a 3. \par -Clinician will provide skills/ crisis coaching with emphasis on identification/ reinforcement of distress tolerance skills in the moment to assist with goal of toleration/ management of self. \par \par GOAL # 1 OBJECTIVE-4: Desirae will continue to attend appointments with psychiatrist and comply with medication as prescribed to support implementation of PLEASE skill. \par Service Description: Medication management \par Frequency: Monthly or as indicated \par Provider: Dr. Dunn \par Interventions: \par - Patient will attend monthly (or as indicated) medication management appointments. \par - Patient will take medications as prescribed. \par \par How will the provider/individual/guardian know that level of care change is warranted? \par  Upon completion of at least 90 % attendance of weekly skills group and individual treatment, client will have: \par -Have attained and are utilizing a skill set to manage self-injurious/ target behaviors. \par -An average distress tolerance level on weekly diary card at 2 or below. \par -Improvement in interpersonal effectiveness skills as monitored by self-report and observed by DBT team. \par \par Discharge Plan- Indicate the anticipated plan for discharge, including Tx., support services, community resources. For [facility] programs, include a description of a substance abuse relapse prevention plan. \par \par Upon completion of agreement for DBT program which entails Skills Group and Individual Therapy, patient will be evaluated for necessity of services and/or referral to outside services. \par \par Individual has participated in the development of this plan [Yes] No, provide reason: \par Other (s) participated in the development of this plan Yes [No] If Yes, List Names  [de-identified] : Session environment conducive to validating, non-judgmental setting. Therapist explored factors related to incompletion of diary card and exploring strategies to support goal of resumption/ daily completion. Therapist highlighted dialectics pertaining to effectiveness. Therapist honed in on vulnerabilities, exploring impact on patient's thoughts, mood and urges/ behavior, exploring how she used skills to cope with this. Therapist provided cumulative DBT skills shaping/ reinforcing. Therapist and patient spoke about upcoming 1-year anniversary of most recent suicide attempt (which resulted in hospitalization and led to referral to DBT program).\par \par Patient shared that she did not complete diary card this week due to physical illness, identifying caring for her and using PLEASE skill. She was able to see that completion of diary card could have been helpful and did agree to recommitting to daily completion starting tonight. Patient acknowledged the role that vulnerabilities (illness and change in daily routine) played on her emotions this week. She also shared awareness of the role that her catastrophic thoughts played. She spoke to use of radical acceptance (of physical illness) and checking the facts to sort out her thoughts, noting a decrease in emotions. Patient shared awareness of feelings of loneliness and sadness, identifying several distress tolerance skills utilized to tolerate emotional increase (i.e. self-soothing, ACCEPTS, IMPROVE, etc.). Patient denied any target behavioral urges/ action in response to emotional intensity. She shared reflection on upcoming anniversary of most recent suicide attempt and hospitalization, reflecting on emotions of sadness (in the past tense) and relief (in the present). Patient shared, "I feel a lot better (now)" in comparison to before DBT treatment. She noted that skills provide her with control even in response to circumstances outside of her control. Patient added, "my emotions no longer control me" and "I no longer feel hopeless." Patient appeared receptive to interventions utilized in session today. She was agreeable to aftercare plan as devised and will be likely assigned to resident for continuation of DBT reinforcement/ medication management through June 2022.  Patient reported compliance with medication as prescribed and is aware of upcoming appointment with Dr. Dunn.    [FreeTextEntry1] : Patient will utilize interpersonal effectiveness skills to communicate with client's parents with goal of getting clearance to return to work.\par Patient will continue participation in weekly DBT skills group sessions as a part of DBT program.\par Patient will continue to complete DBT diary card daily which will be reviewed weekly in individual session.\par Patient will outreach therapist for crisis skills coaching, if needed. \par Patient will comply with medication as prescribed.\par  normal

## 2022-10-06 ENCOUNTER — OUTPATIENT (OUTPATIENT)
Dept: OUTPATIENT SERVICES | Facility: HOSPITAL | Age: 27
LOS: 1 days | Discharge: HOME | End: 2022-10-06

## 2022-10-06 ENCOUNTER — APPOINTMENT (OUTPATIENT)
Dept: PSYCHIATRY | Facility: CLINIC | Age: 27
End: 2022-10-06

## 2022-10-06 DIAGNOSIS — F33.2 MAJOR DEPRESSIVE DISORDER, RECURRENT SEVERE WITHOUT PSYCHOTIC FEATURES: ICD-10-CM

## 2022-10-06 DIAGNOSIS — F60.3 BORDERLINE PERSONALITY DISORDER: ICD-10-CM

## 2022-10-06 NOTE — DISCUSSION/SUMMARY
[Other: ___] : [unfilled] [Other: ____] : [unfilled] [de-identified] : DBT Skills Training Group (as part of intensive DBT program) virtually [FreeTextEntry8] :  facilitated the continuation of DBT skills group in a supportive, validating, non-judgmental setting. Guided mindfulness exercise was conducted on validation of each group member. The goal of this mindfulness exercise was to put each group member in the spotlight - one at a time - while other group participants shared a word that described the group member. The ability to be present and the awareness of emotions, thoughts, physical sensations were explored with each group participant.  reviewed homework on values and priorities (accumulating positive events in the long term). Skills shaping/ reinforcing were provided as examples were shared. Emotional regulation handouts 19 were reviewed as Building Mastery and Coping Ahead skills were introduced.  walked through an example demonstrating use of skill and obtained examples of mastery and coping ahead from group participants. Homework was assigned to reinforce new skills. [FreeTextEntry4] : Patient participated in mindfulness exercise. She shared about feelings of “discomfort” in anticipation of participating in exercise due to being in the spotlight and receiving validation. She noted experiencing positive emotions and feelings of “warmth and happiness” when taking in words shared by others. She was aware of judgmental thoughts that arose at times but was able to utilize non-judgmental stance skill, noting a decrease in judgements. Patient completed homework, identifying trouble prioritizing values initially as there were many competing options. She was able to identify value, goals and action step. Patient appeared receptive to skills shaping/ reinforcing provided. She appeared engaged and interested as new skills (Building Mastery & Coping Ahead) were reviewed. She shared examples related to building mastery and coping ahead, seeing the value in these skills supporting emotional regulation goal.    [de-identified] : Patient will complete emotional regulation worksheet #12 – Build Mastery & Coping Ahead  \par Patient will continue participation in weekly DBT skills group sessions as a part of DBT program.    \par Patient will continue to complete DBT diary card daily which will be reviewed weekly in individual session. \par Patient will outreach therapist for crisis skills coaching if needed.    \par Patient will comply with medication as prescribed.

## 2022-10-10 ENCOUNTER — APPOINTMENT (OUTPATIENT)
Dept: PSYCHIATRY | Facility: CLINIC | Age: 27
End: 2022-10-10

## 2022-10-10 ENCOUNTER — OUTPATIENT (OUTPATIENT)
Dept: OUTPATIENT SERVICES | Facility: HOSPITAL | Age: 27
LOS: 1 days | Discharge: HOME | End: 2022-10-10

## 2022-10-10 DIAGNOSIS — F60.3 BORDERLINE PERSONALITY DISORDER: ICD-10-CM

## 2022-10-10 DIAGNOSIS — F33.2 MAJOR DEPRESSIVE DISORDER, RECURRENT SEVERE WITHOUT PSYCHOTIC FEATURES: ICD-10-CM

## 2022-10-10 NOTE — DISCUSSION/SUMMARY
[Other: ___] : [unfilled] [Other: ____] : [unfilled] [de-identified] : DBT Skills Training group (virtual)  [FreeTextEntry8] :  facilitated the continuation of DBT skills group in a supportive, validating, non-judgmental setting. Guided mindfulness exercise was conducted on observation of ‘windows of the world’ video clip. The ability to be present and the awareness of emotions, thoughts, physical sensations were explored with each group participant.  reviewed homework on building mastery and coping ahead skills/ outcomes, providing skills shaping/ reinforcing  as examples were shared. Emotional regulation handouts 20 were reviewed as PLEASE skills were introduced.  obtained insights/ feedback from each group participant. Trouble-shooting emotional regulation skills was also reviewed. Homework was assigned to reinforce new skills.  [FreeTextEntry4] : Patient participated in mindfulness exercise. She described specific scenes that she observed and stuck in her mind. She noted feeling "focused" and "relaxed." She noted emotion of envy aligned with goal of wanting to travel more. She did not  this emotion/ experience. Patient completed her homework, providing examples of use building mastery and coping ahead skills. She specifically shared examples of things added to her routine that provided a sense of completion and feelings of confidence and competence. She also shared about coping ahead for social event and a work related scenario, utilizing skills from all modules to support her preparation and demonstrating understanding of cumulative skill use/ benefits.  Patient appeared receptive to skills shaping/ reinforcing provided. Patient appeared engaged and interested as PLEASE skill components were reviewed. She shared insights into unbalanced sleeping and eating and the impact this has on her vulnerabilities/ emotion mind. She was active participant in discussion pertaining to PLEASE skill, providing several examples/ offering valuable feedback.  [de-identified] : Patient will complete emotional regulation worksheet #14 – PLEASE Skill \par Patient will continue participation in weekly DBT skills group sessions as a part of DBT program.    \par Patient will continue to complete DBT diary card daily which will be reviewed weekly in individual session. \par Patient will outreach therapist for crisis skills coaching if needed.    \par Patient will comply with medication as prescribed.

## 2022-10-11 ENCOUNTER — OUTPATIENT (OUTPATIENT)
Dept: OUTPATIENT SERVICES | Facility: HOSPITAL | Age: 27
LOS: 1 days | Discharge: HOME | End: 2022-10-11

## 2022-10-11 ENCOUNTER — APPOINTMENT (OUTPATIENT)
Dept: PSYCHIATRY | Facility: CLINIC | Age: 27
End: 2022-10-11
Payer: MEDICAID

## 2022-10-11 DIAGNOSIS — F33.2 MAJOR DEPRESSIVE DISORDER, RECURRENT SEVERE WITHOUT PSYCHOTIC FEATURES: ICD-10-CM

## 2022-10-11 DIAGNOSIS — F60.3 BORDERLINE PERSONALITY DISORDER: ICD-10-CM

## 2022-10-11 PROCEDURE — XXXXX: CPT | Mod: 1L

## 2022-10-19 ENCOUNTER — APPOINTMENT (OUTPATIENT)
Dept: PSYCHIATRY | Facility: CLINIC | Age: 27
End: 2022-10-19

## 2022-10-19 ENCOUNTER — OUTPATIENT (OUTPATIENT)
Dept: OUTPATIENT SERVICES | Facility: HOSPITAL | Age: 27
LOS: 1 days | Discharge: HOME | End: 2022-10-19

## 2022-10-19 DIAGNOSIS — F60.3 BORDERLINE PERSONALITY DISORDER: ICD-10-CM

## 2022-10-19 DIAGNOSIS — F33.2 MAJOR DEPRESSIVE DISORDER, RECURRENT SEVERE WITHOUT PSYCHOTIC FEATURES: ICD-10-CM

## 2022-10-19 NOTE — PHYSICAL EXAM
Thank you for choosing [facility]. It was a pleasure to see you for your office visit today.     If you have any questions or scheduling needs during regular office hours, please call our [place] clinic: [phone]   If urgent concerns arise after hours, you can call [phone] and ask to speak to the pediatric specialist on call.   If you need to schedule Radiology tests, please call: [phone]  My Chart messages are for routine communication and questions and are usually answered within 48-72 hours. If you have an urgent concern or require sooner response, please call us.  Outside lab and imaging results should be faxed to [phone].  If you go to a lab outside of [facility] we will not automatically get those results. You will need to ask to have them faxed.     1. Food Goal: Will be meeting with our dietician next.     2. Activity Goal: Will look into starting soccer this summer. Otherwise, continue the current plan (using the treadmill or going outside for at least 30 minutes a day).     3. Medications: We can hold off for now, but can always consider in the future as needed.    4. Continue to take vitamin D3 2000 international unit(s) daily.     5. We have an adult weight management clinic at the [facility]. This is located at the [facility] on the [place] at the [facility]. We have providers there that see patients most days of the week. I personally see patients there on the second Friday of the month. We also do virtual appointments as well (via video or telephone visits). The following is our information (below).  You can be referred by your primary care provider or you can call us at [phone];  line: [phone]).  [email or web address]    6. We will hold off on doing labs today.     7. We can plan to see you again in the  summer. You can always let us know (give us a call at [phone]; interpretor line: [phone]) if you have any questions or concerns in the interim.     If you had any blood work, imaging or other tests completed today:  Normal test results will be mailed to your home address in a letter.  Abnormal results will be communicated to you via phone call/letter.  Please allow up to 1-2 weeks for processing and interpretation of most lab work.       [Cooperative] : cooperative [Euthymic] : euthymic [Full] : full [Clear] : clear [Linear/Goal Directed] : linear/goal directed [Average] : average [WNL] : within normal limits

## 2022-10-19 NOTE — RISK ASSESSMENT
[Yes, patient reports ideation or behavior] : Yes, patient reports ideation or behavior [No, patient denies ideation or behavior] : No, patient denies ideation or behavior [Yes] : Yes [No] : No [Yes, within past three months ago] : Yes, within past three months ago [(3) 2-5 times per week] : 2 to 5 times per week [(1) Fleeting - few seconds/minutes] : Fleeting - a few seconds or minutes [(1) Easily able to control thoughts] : Easily able to control thoughts [(0) Does not apply] : Does not apply

## 2022-10-20 ENCOUNTER — OUTPATIENT (OUTPATIENT)
Dept: OUTPATIENT SERVICES | Facility: HOSPITAL | Age: 27
LOS: 1 days | Discharge: HOME | End: 2022-10-20

## 2022-10-20 ENCOUNTER — APPOINTMENT (OUTPATIENT)
Dept: PSYCHIATRY | Facility: CLINIC | Age: 27
End: 2022-10-20

## 2022-10-20 DIAGNOSIS — F33.2 MAJOR DEPRESSIVE DISORDER, RECURRENT SEVERE WITHOUT PSYCHOTIC FEATURES: ICD-10-CM

## 2022-10-20 DIAGNOSIS — F60.3 BORDERLINE PERSONALITY DISORDER: ICD-10-CM

## 2022-10-20 NOTE — REASON FOR VISIT
[Home] : at home, [unfilled] , at the time of the visit. [Medical Office: ([facility])___] : at the medical office located in  [Self] : self [Patient] : Patient [FreeTextEntry1] : Continuation of mental health treatment (as active participant in DBT program).

## 2022-10-20 NOTE — PLAN
[Dialectical Behavior Therapy] : Dialectical Behavior Therapy  [Psychoeducation] : Psychoeducation  [Skills training (all types)] : Skills training (all types)  [Recommended Frequency of Visits: ____] : Recommended frequency of visits: [unfilled] [Return in ____ week(s)] : Return in [unfilled] week(s) [FreeTextEntry2] : GOAL # 1 OBJECTIVE- 1: Desirae will learn/ implement 3 skills to tolerate unwanted emotions/ urges to prevent emotional intensity from increasing/ mitigate crisis daily. \par Service Description/ Modality: Skills Group/ Individual therapy/ Skills Coaching \par Frequency: Weekly skills group sessions and weekly individual therapy sessions. \par Responsible provider: ERIC BETHEA \par Interventions: \par -Clinicians will provide a validating environment promoting a nonjudgmental atmosphere of self and an understanding of self along with understanding of dialectics in acceptance and change. \par -Clinicians will conduct weekly psycho-educational skills group on: distress tolerance module, review of mindfulness skills, introduction to emotional regulation module. \par -Clinicians will shape and reinforce skillful behaviors for client to generalize in their environment i.e. Homework review, role playing, etc. \par \par GOAL # 1 OBJECTIVE: 2: Desirae will implement mindfulness skills with goal of being in the present moment. She will utilize grounding techniques and other skills if aware of being in the future. \par Service Description/ Modality: Skills Group/ Individual therapy/ Skills Coaching \par Frequency: Weekly skills group sessions and weekly individual therapy sessions. \par Responsible provider: ERIC BETHEA \par Interventions: \par -Clinician will provide shaping, reinforcing for mindfulness skills in sessions. \par -Clinician will explore examples of mindfulness skills practice along with outcomes. \par -Clinician will conduct mindfulness exercises at the start of each skills group to promote and reinforce skills concepts/ effectiveness. \par \par GOAL # 1 OBJECTIVE-3: Desirae will contact therapist if emotional intensity is rated at a 3 or higher for skills coaching. \par Service Description/ Modality: Skills Coaching \par Frequency: As indicated based on emotional intensity/ difficulty problem-solving/ implementing skills \par Responsible provider: ERIC BETHEA \par -Clinician will offer/ encourage use of skills/ crisis coaching when level of emotional intensity exceeds a 3. \par -Clinician will provide skills/ crisis coaching with emphasis on identification/ reinforcement of distress tolerance skills in the moment to assist with goal of toleration/ management of self. \par \par GOAL # 1 OBJECTIVE-4: Desirae will continue to attend appointments with psychiatrist and comply with medication as prescribed to support implementation of PLEASE skill. \par Service Description: Medication management \par Frequency: Monthly or as indicated \par Provider: Dr. Dunn \par Interventions: \par - Patient will attend monthly (or as indicated) medication management appointments. \par - Patient will take medications as prescribed. \par \par How will the provider/individual/guardian know that level of care change is warranted? \par  Upon completion of at least 90 % attendance of weekly skills group and individual treatment, client will have: \par -Have attained and are utilizing a skill set to manage self-injurious/ target behaviors. \par -An average distress tolerance level on weekly diary card at 2 or below. \par -Improvement in interpersonal effectiveness skills as monitored by self-report and observed by DBT team. \par \par Discharge Plan- Indicate the anticipated plan for discharge, including Tx., support services, community resources. For [facility] programs, include a description of a substance abuse relapse prevention plan. \par \par Upon completion of agreement for DBT program which entails Skills Group and Individual Therapy, patient will be evaluated for necessity of services and/or referral to outside services. \par \par Individual has participated in the development of this plan [Yes] No, provide reason: \par Other (s) participated in the development of this plan Yes [No] If Yes, List Names  [de-identified] : Session environment conducive to validating, non-judgmental setting. Therapist and patient reviewed completed diary card. Therapist provided shaping related to mindfulness skills as prompting event that resulted in increased emotional intensity was discussed. Therapist worked with patient to check the facts pertaining to situation, identifying assumptions/ interpretations and working to identify alternative possibilities. Therapist helped patient to identify strategies to support maintaining relationship as opposed to avoidance. Therapist provided cumulative DBT skills shaping/ reinforcing. Therapist and patient spoke about feelings of nervousness related to DBT graduation tomorrow. \par \par Patient demonstrated improved, consistent diary card completion this week as part of follow through on PLEASED skill. She reported the past week as "difficult", sharing insights pertaining to vulnerabilities and prompting event. Patient was able to see the role that assumptions/ interpretations are playing in response to situation and appeared receptive to exploring alternative possibilities. Patient was also receptive to exploring use of interpersonal effectiveness skills to support goal of obtaining facts from person in situation, acknowledging that avoidance may cause friction or tension in relationship and maintaining the relationship is important. Patient agreed to think about this from her "wise mind" and therapist will follow up next session. Patient also shared feelings pertaining to anticipation of graduation from skills group tomorrow, acknowledging feelings of nervousness and sadness. She highlighted progress made in DBT, noting "I no longer let my emotions dictate my actions." Following review of after-care plan, patient reported feeling "a little less anxious." She spoke about lack of trust in self pertaining to continuing progress. Patient was receptive to challenging this thoughts with facts of scenarios that occurred during her time in treatment and provided feedback to connect DBT skills as "control." [FreeTextEntry1] : \par Patient will continue participation in weekly DBT skills group sessions as a part of DBT program.\par Patient will continue to complete DBT diary card daily which will be reviewed weekly in individual session.\par Patient will outreach therapist for crisis skills coaching, if needed. \par Patient will comply with medication as prescribed.\par

## 2022-10-20 NOTE — DISCUSSION/SUMMARY
[Other: ___] : [unfilled] [Other: ____] : [unfilled] [FreeTextEntry8] :  facilitated the continuation of DBT skills group in a supportive, validating, non-judgmental setting. Mindfulness exercise was conducted in the form of an ice-breaker. Goal of DBT skills group and group guidelines were reviewed. Options for solving problems were reviewed as the value of DBT skills were highlighted. Three group members graduated and their accomplishments were acknowledged by the group members and leaders. New group member was welcomed and validation of feelings were provided in response to this.   [de-identified] : DBT Skills Training Group (in person) [FreeTextEntry4] : Patient participated in mindfulness exercise noting ability to stay mindful/ present as unique facts pertaining to each participant was shared. Patient was appropriate throughout group, appearing engaged and interested. She was attentive and agreeable as DBT skills group goals and guidelines were reviewed. Patient reflected on progress made in her treatment thus far, noting improvements made as compared to previously identified goals, highlighting follow through on important commitments and getting control over ineffective behaviors/ emotions as examples. She noted getting/ maintaining a job as an example of progress reflecting on her ability to tolerate unexpected stressors related to job more effectively.   [de-identified] : Patient has completed DBT program.\par She will remain in individual therapy with this therapist at 2X/ month basis for skills reinforcement/ termination. \par Patient will be transitioned to resident for aftercare.\par She will remain compliant with medication as prescribed.

## 2022-10-26 ENCOUNTER — OUTPATIENT (OUTPATIENT)
Dept: OUTPATIENT SERVICES | Facility: HOSPITAL | Age: 27
LOS: 1 days | Discharge: HOME | End: 2022-10-26

## 2022-10-26 ENCOUNTER — APPOINTMENT (OUTPATIENT)
Dept: PSYCHIATRY | Facility: CLINIC | Age: 27
End: 2022-10-26

## 2022-10-26 DIAGNOSIS — F33.2 MAJOR DEPRESSIVE DISORDER, RECURRENT SEVERE WITHOUT PSYCHOTIC FEATURES: ICD-10-CM

## 2022-10-26 DIAGNOSIS — F60.3 BORDERLINE PERSONALITY DISORDER: ICD-10-CM

## 2022-11-08 NOTE — PLAN
[FreeTextEntry5] : \par - Continue Wellbutrin 300mg daily for symptoms of depression\par - Continue Vistaril 25mg Qhs PRN insomnia\par - Ativan 0.5mg, PRN panic symptoms, no refill needed today. Used one tablet in past two months. \par -Follow up in 4 weeks\par - continue with DBT skills group as well as individual therapy \par  \par

## 2022-11-08 NOTE — HISTORY OF PRESENT ILLNESS
[FreeTextEntry1] : Desirae was seen for psychiatric follow up. Upon interview, she reported mood as "good." Denied feeling depressed or hopeless. Denied thoughts of not wanting to be alive. Denied thoughts of suicide or wanting to harm himself or others. Patient also denied symptoms consistent with noelle or hypomania. Desirae reports she has been sleeping well and eating well. She reported being able to enjoy aspects of her life. She denied other psychiatric symptoms and denied side effects from medication. \par

## 2022-11-08 NOTE — SOCIAL HISTORY
[FreeTextEntry1] : Cannabis: yes \par Description: reports smoking 1 joint daily for years. She reports starting to smoke at age 18. \par Cocaine: no known use \par Heroin/Opiates: no known use \par Benzodiazepines: no known use \par Other Illicit Drugs/ Prescription/ OTC medications: no known use \par

## 2022-11-08 NOTE — DISCUSSION/SUMMARY
[FreeTextEntry1] : Desirae Edmonds is a 26 year old single  Female, domiciled with family, currently unemployed, with past psychiatric history of Major Depressive Disorder, Borderline Personality Disorder, Anxiety, multiple prior inpatient psychiatric admissions, prior suicide attempts by OD, most recently in outpatient treatment privately with Dr. Cross and Ms. Avril Stokes in the community, who was admitted to ICU and intubated after OD on Bupropion, Effexor, and Trileptal in a suicide attempt, admitted to [facility] IPP from 11/4/21 to 11/10/21, subsequently attended partial hospitalization program (PHP) here at [facility] for aftercare in 12/2021, who presents today for routine med mgmt follow up. \par \par Desirae is reporting that she feels consistently good with regard to mood and is denying depressive symptoms. She states she has been feeling more stable than she has in the past. Risks and benefits of treatment options were discussed. Shared decision making was used to devise the following plan. She reports sleeping well with just one tablet of Vistaril. Risks and benefits discussed. Psychoeducation provided. \par \par  \par

## 2022-11-09 ENCOUNTER — OUTPATIENT (OUTPATIENT)
Dept: OUTPATIENT SERVICES | Facility: HOSPITAL | Age: 27
LOS: 1 days | Discharge: HOME | End: 2022-11-09

## 2022-11-09 ENCOUNTER — APPOINTMENT (OUTPATIENT)
Dept: PSYCHIATRY | Facility: CLINIC | Age: 27
End: 2022-11-09

## 2022-11-09 DIAGNOSIS — F60.3 BORDERLINE PERSONALITY DISORDER: ICD-10-CM

## 2022-11-09 DIAGNOSIS — F33.2 MAJOR DEPRESSIVE DISORDER, RECURRENT SEVERE WITHOUT PSYCHOTIC FEATURES: ICD-10-CM

## 2022-11-10 NOTE — RISK ASSESSMENT
[Yes, patient reports ideation or behavior] : Yes, patient reports ideation or behavior [No, patient denies ideation or behavior] : No, patient denies ideation or behavior [Yes] : Yes [No] : No [Yes, more than three months ago] : Yes, more than three months ago

## 2022-11-10 NOTE — PLAN
[Dialectical Behavior Therapy] : Dialectical Behavior Therapy  [Psychoeducation] : Psychoeducation  [Skills training (all types)] : Skills training (all types)  [Recommended Frequency of Visits: ____] : Recommended frequency of visits: [unfilled] [Return in ____ week(s)] : Return in [unfilled] week(s) [FreeTextEntry2] : GOAL # 1 OBJECTIVE- 1: Desirae will learn/ implement 3 skills to tolerate unwanted emotions/ urges to prevent emotional intensity from increasing/ mitigate crisis daily. \par Service Description/ Modality: Skills Group/ Individual therapy/ Skills Coaching \par Frequency: Weekly skills group sessions and weekly individual therapy sessions. \par Responsible provider: ERIC BETHEA \par Interventions: \par -Clinicians will provide a validating environment promoting a nonjudgmental atmosphere of self and an understanding of self along with understanding of dialectics in acceptance and change. \par -Clinicians will conduct weekly psycho-educational skills group on: distress tolerance module, review of mindfulness skills, introduction to emotional regulation module. \par -Clinicians will shape and reinforce skillful behaviors for client to generalize in their environment i.e. Homework review, role playing, etc. \par \par GOAL # 1 OBJECTIVE: 2: Desirae will implement mindfulness skills with goal of being in the present moment. She will utilize grounding techniques and other skills if aware of being in the future. \par Service Description/ Modality: Skills Group/ Individual therapy/ Skills Coaching \par Frequency: Weekly skills group sessions and weekly individual therapy sessions. \par Responsible provider: ERIC BETHEA \par Interventions: \par -Clinician will provide shaping, reinforcing for mindfulness skills in sessions. \par -Clinician will explore examples of mindfulness skills practice along with outcomes. \par -Clinician will conduct mindfulness exercises at the start of each skills group to promote and reinforce skills concepts/ effectiveness. \par \par GOAL # 1 OBJECTIVE-3: Desirae will contact therapist if emotional intensity is rated at a 3 or higher for skills coaching. \par Service Description/ Modality: Skills Coaching \par Frequency: As indicated based on emotional intensity/ difficulty problem-solving/ implementing skills \par Responsible provider: ERIC BETHEA \par -Clinician will offer/ encourage use of skills/ crisis coaching when level of emotional intensity exceeds a 3. \par -Clinician will provide skills/ crisis coaching with emphasis on identification/ reinforcement of distress tolerance skills in the moment to assist with goal of toleration/ management of self. \par \par GOAL # 1 OBJECTIVE-4: Desirae will continue to attend appointments with psychiatrist and comply with medication as prescribed to support implementation of PLEASE skill. \par Service Description: Medication management \par Frequency: Monthly or as indicated \par Provider: Dr. Dunn \par Interventions: \par - Patient will attend monthly (or as indicated) medication management appointments. \par - Patient will take medications as prescribed. \par \par How will the provider/individual/guardian know that level of care change is warranted? \par  Upon completion of at least 90 % attendance of weekly skills group and individual treatment, client will have: \par -Have attained and are utilizing a skill set to manage self-injurious/ target behaviors. \par -An average distress tolerance level on weekly diary card at 2 or below. \par -Improvement in interpersonal effectiveness skills as monitored by self-report and observed by DBT team. \par \par Discharge Plan- Indicate the anticipated plan for discharge, including Tx., support services, community resources. For [facility] programs, include a description of a substance abuse relapse prevention plan. \par \par Upon completion of agreement for DBT program which entails Skills Group and Individual Therapy, patient will be evaluated for necessity of services and/or referral to outside services. \par \par Individual has participated in the development of this plan [Yes] No, provide reason: \par Other (s) participated in the development of this plan Yes [No] If Yes, List Names  [de-identified] : Session environment conducive to validating, non-judgmental setting. Therapist and patient reviewed completed diary card. Therapist provided cumulative DBT skills shaping/ reinforcing, utilizing emotional model to highlight vulnerabilities/ prompting event that resulted in anger emotion/ urges. Therapist and patient identified several skills applied here, showing the value of skills as promoting emotional/behavioral control/ effectiveness. Therapist reinforced capabilities to manage/ tolerate "more emotional" 2 weeks using skills and preventing crisis. \par \par Patient participated in virtual session as scheduled. She reported "things have been pretty okay" in 2 weeks since last session. Patient denied having any "fights" with mom, but did note a small argument that occurred, adding that she utilized skills to respond to this effectively. She shared awareness related to the prompting event here, acknowledging how her interpretation resulted in her emotion of anger and urges to react to this ineffectively. Patient demonstrated awareness related to the emotional model and willingness to apply skills from all modules to support her effectiveness (i.e: STOP, opposite action, Pros/Cons, wise mind, checking the facts, etc.) Patient noted these skills as providing her control and was able to utilize interpersonal effectiveness skills to revisit prompting event with mom when she was more "wise minded." Patient shared about a scenario related to work where she also applied skills, noting increased application of mindfulness practices to "ground self in the now" as opposed to thinking in to future. She reflected on use of her breathing to ground herself along with use of her 5 senses in her environment. She noted that this helped to reduce suffering and control anxiety. Patient also shared awareness of vulnerabilities resulting in her being "more emotional" but shared that she was able to cope with and "navigate" these emotions using her skills. Passive S/I remains decreased/ manageable. She reported medication compliance.  [FreeTextEntry1] : Patient will continue use of mindfulness skills daily to ground self and promote anxiety management. \par Patient will review materials on cognitive distortions and will utilize supplemental worksheet to promote continued skill use in response to ineffective thinking patterns. \par Patient will continue to complete DBT diary card daily which will be reviewed biweekly individual sessions in DBT aftercare.\par Patient will outreach therapist for crisis skills coaching, if needed. \par Patient will comply with medication as prescribed.\par

## 2022-11-10 NOTE — REASON FOR VISIT
[Home] : at home, [unfilled] , at the time of the visit. [Medical Office: ([facility])___] : at the medical office located in  [Self] : self [Patient] : Patient [FreeTextEntry1] : Continuation of mental health treatment (as recent graduate of intensive DBT program).

## 2022-11-15 VITALS — WEIGHT: 117 LBS | BODY MASS INDEX: 19.49 KG/M2 | HEIGHT: 65 IN

## 2022-11-23 ENCOUNTER — OUTPATIENT (OUTPATIENT)
Dept: OUTPATIENT SERVICES | Facility: HOSPITAL | Age: 27
LOS: 1 days | Discharge: HOME | End: 2022-11-23

## 2022-11-23 ENCOUNTER — APPOINTMENT (OUTPATIENT)
Dept: PSYCHIATRY | Facility: CLINIC | Age: 27
End: 2022-11-23

## 2022-11-23 DIAGNOSIS — F60.3 BORDERLINE PERSONALITY DISORDER: ICD-10-CM

## 2022-11-23 DIAGNOSIS — F33.2 MAJOR DEPRESSIVE DISORDER, RECURRENT SEVERE WITHOUT PSYCHOTIC FEATURES: ICD-10-CM

## 2022-11-23 NOTE — REASON FOR VISIT
[Patient] : Patient [FreeTextEntry1] : Continuation of mental health treatment (as recent graduate of intensive DBT program).

## 2022-11-23 NOTE — DISCUSSION/SUMMARY
[1. Helpful Person/Contact Number: _____] : 1. Helpful Person/Contact Number: [unfilled] [2. Helpful Person/Contact Number: _____] : 2. Helpful Person/Contact Number: [unfilled] [a. Clinician Name/Contact Information: _____] : Clinician Name/Contact Information: [unfilled] [b. Clinician Name/Contact Information: _____] : Clinician Name/Contact Information: [unfilled] [c. Local ED/Urgent Care Services/Hotlines (Name/Address/Phone):] : Local ED/Urgent Care Services/Hotlines (Name/Address/Phone): [d. Suicide Prevention Lifeline Phone: 7-402-878-TALK ([phone]) ] : Suicide Prevention Lifeline Phone: 5-577-556-EOMR ([phone])  [e. Suicide Prevention TEXT 298827] : Suicide Prevention TEXT 412062 [g. Suicide & Crisis Lifeline - send a text or make a call to 988] : Suicide & Crisis Lifeline - send a text or make a call to 988 [FreeTextEntry1] : 11/23/22 [FreeTextEntry2] : -Just prior to/ during monthly cycle (increased emotional vulnerabilities)\par - Ruminating on negative thoughts/ negative self-talk\par - Perceived invalidation/ someone making a critical comment towards me\par - Perceived feelings of rejection\par - Feeling disrespected/ unappreciated [FreeTextEntry3] : - Avoidant/ isolating tendencies (cancelling on commitments)\par - Increased negative self-talk \par - Increased emotions of shame/ sadness/ fear\par - Urges to self-harm/ suicidal ideations  [FreeTextEntry4] : DBT based- skills plan in response to triggers/ warning signs:\par -radical acceptance/ validation of vulnerabilities pertaining to monthly cycle\par - -self-soothing and radical acceptance to support goal of toleration\par - Pros and Cons pertaining to ruminating/ negative self-talk\par - Cognitive distortion awareness and DBT skills planning\par - Dialectical perspective\par - opposite action in response to ineffective urges\par - STOP to prevent impulsive actions/ wise mind practices\par - use of imagery to cope\par -checking the fact in response in response to "perceived feelings/ assumptions"\par - PLEASE skill to reduce emotional vulnerabilities \par - [place] ahead in response to potentially emotional situation\par - Self-soothing/ Pros and Cons in response to ineffective behaviors\par - Distract by pushing away passive S/I, changing thoughts  [FreeTextEntry5] : -Frederic, boyfriend\par -Mom\par -In the car driving around\par -Walking hartman [de-identified] : Psychiatrist on call- [phone]\[facility] Crisis Team- [phone] [de-identified] : Patient denied any safety risks pertaining to her environment. She no longer feels that mom needs to be dispensing medication to her due to her ability/ willingness to use skills and management over ineffective behaviors and passive suicidal thoughts (which are reduced in frequency and intensity as compared to time of initial safety plan development).  [de-identified] : Patient identified potential barrier of willfulness.  [de-identified] : Patient will use opposite action to promote willingness. She agreed to utilize DBT binder to promote skill use and call for coaching if she remains stuck.\par \par  In the event of crisis, I will try to implement the internal coping strategies and contact social support persons identified above. If this is not helpful, I agree to contact my mental health treatment providers listed above for assistance. In the event of an emergency, I agree to call 911 or present to my nearest emergency room for assistance/ evaluation. [de-identified] : "My future- career, family, etc."

## 2022-11-23 NOTE — RISK ASSESSMENT
[Yes, patient reports ideation or behavior] : Yes, patient reports ideation or behavior [No, patient denies ideation or behavior] : No, patient denies ideation or behavior [Yes] : Yes [No] : No [Yes, more than three months ago] : Yes, more than three months ago [FreeTextEntry8] : At the time of session today, patient reported that her passive suicidal ideations are "much lower in intensity" and "shorter in duration" a as compared to when she started treatment. She reported experiencing these thoughts approximately once weekly as opposed to daily/ throughout the day; reporting them as manageable with DBT skills application.

## 2022-11-23 NOTE — PLAN
[Dialectical Behavior Therapy] : Dialectical Behavior Therapy  [Psychoeducation] : Psychoeducation  [Skills training (all types)] : Skills training (all types)  [Recommended Frequency of Visits: ____] : Recommended frequency of visits: [unfilled] [Return in ____ week(s)] : Return in [unfilled] week(s) [FreeTextEntry2] : GOAL # 1 OBJECTIVE- 1: Desirae will learn/ implement 3 skills to tolerate unwanted emotions/ urges to prevent emotional intensity from increasing/ mitigate crisis daily. \par Service Description/ Modality: Skills Group/ Individual therapy/ Skills Coaching \par Frequency: Weekly skills group sessions and weekly individual therapy sessions. \par Responsible provider: ERIC BETHEA \par Interventions: \par -Clinicians will provide a validating environment promoting a nonjudgmental atmosphere of self and an understanding of self along with understanding of dialectics in acceptance and change. \par -Clinicians will conduct weekly psycho-educational skills group on: distress tolerance module, review of mindfulness skills, introduction to emotional regulation module. \par -Clinicians will shape and reinforce skillful behaviors for client to generalize in their environment i.e. Homework review, role playing, etc. \par \par GOAL # 1 OBJECTIVE: 2: Desirae will implement mindfulness skills with goal of being in the present moment. She will utilize grounding techniques and other skills if aware of being in the future. \par Service Description/ Modality: Skills Group/ Individual therapy/ Skills Coaching \par Frequency: Weekly skills group sessions and weekly individual therapy sessions. \par Responsible provider: ERIC BETHEA \par Interventions: \par -Clinician will provide shaping, reinforcing for mindfulness skills in sessions. \par -Clinician will explore examples of mindfulness skills practice along with outcomes. \par -Clinician will conduct mindfulness exercises at the start of each skills group to promote and reinforce skills concepts/ effectiveness. \par \par GOAL # 1 OBJECTIVE-3: Desirae will contact therapist if emotional intensity is rated at a 3 or higher for skills coaching. \par Service Description/ Modality: Skills Coaching \par Frequency: As indicated based on emotional intensity/ difficulty problem-solving/ implementing skills \par Responsible provider: ERIC BETHEA \par -Clinician will offer/ encourage use of skills/ crisis coaching when level of emotional intensity exceeds a 3. \par -Clinician will provide skills/ crisis coaching with emphasis on identification/ reinforcement of distress tolerance skills in the moment to assist with goal of toleration/ management of self. \par \par GOAL # 1 OBJECTIVE-4: Desirae will continue to attend appointments with psychiatrist and comply with medication as prescribed to support implementation of PLEASE skill. \par Service Description: Medication management \par Frequency: Monthly or as indicated \par Provider: Dr. Dunn \par Interventions: \par - Patient will attend monthly (or as indicated) medication management appointments. \par - Patient will take medications as prescribed. \par \par How will the provider/individual/guardian know that level of care change is warranted? \par  Upon completion of at least 90 % attendance of weekly skills group and individual treatment, client will have: \par -Have attained and are utilizing a skill set to manage self-injurious/ target behaviors. \par -An average distress tolerance level on weekly diary card at 2 or below. \par -Improvement in interpersonal effectiveness skills as monitored by self-report and observed by DBT team. \par \par Discharge Plan- Indicate the anticipated plan for discharge, including Tx., support services, community resources. For [facility] programs, include a description of a substance abuse relapse prevention plan. \par \par Upon completion of agreement for DBT program which entails Skills Group and Individual Therapy, patient will be evaluated for necessity of services and/or referral to outside services. \par \par Individual has participated in the development of this plan [Yes] No, provide reason: \par Other (s) participated in the development of this plan Yes [No] If Yes, List Names  [de-identified] : Session environment conducive to validating, non-judgmental setting. Therapist and patient reviewed completed diary card. Therapist provided cumulative DBT skills shaping/ reinforcing, as recent crisis situation was reviewed (and ability to manage/ tolerate highlighted). Therapist and patient collaboratively reviewed/ completed safety plan in session, reinforcing DBT skills to support toleration of emotional intensity/ crisis.  \par \par  Patient attended virtual session today as scheduled. Patient was active participant in session as safety plan was reviewed/ devised collaboratively. Patient demonstrated improved awareness of triggers, warning signs and DBT-based coping skills to utilize to support goal of tolerating crisis. She shared about barriers related to willfulness and was involved in brainstorming solutions related to this, devising plan  in response to this. At time of session patient denied experiencing any triggers or warning signs. At the time of session today, patient reported that her passive suicidal ideations are "much lower in intensity" and "shorter in duration" a as compared to when she started treatment. She reported experiencing these thoughts approximately once weekly as opposed to daily/ throughout the day; reporting them as manageable with DBT skills application. She reported compliance with medication as prescribed.  \par  [FreeTextEntry1] : Patient will utilize skills as identified on safety plan in response to triggers/ warning signs.\par She requested that completed safety plan be scanned and emailed to her, as session today took place virtually. \par Patient will continue to complete DBT diary card daily which will be reviewed biweekly individual sessions in DBT aftercare.\par Patient will outreach therapist for crisis skills coaching, if needed. \par Patient will comply with medication as prescribed.\par

## 2022-11-23 NOTE — PLAN
[Dialectical Behavior Therapy] : Dialectical Behavior Therapy  [Psychoeducation] : Psychoeducation  [Skills training (all types)] : Skills training (all types)  [Recommended Frequency of Visits: ____] : Recommended frequency of visits: [unfilled] [Return in ____ week(s)] : Return in [unfilled] week(s) [FreeTextEntry2] : GOAL # 1 OBJECTIVE- 1: Desirae will learn/ implement 3 skills to tolerate unwanted emotions/ urges to prevent emotional intensity from increasing/ mitigate crisis daily. \par Service Description/ Modality: Skills Group/ Individual therapy/ Skills Coaching \par Frequency: Weekly skills group sessions and weekly individual therapy sessions. \par Responsible provider: ERIC BETHEA \par Interventions: \par -Clinicians will provide a validating environment promoting a nonjudgmental atmosphere of self and an understanding of self along with understanding of dialectics in acceptance and change. \par -Clinicians will conduct weekly psycho-educational skills group on: distress tolerance module, review of mindfulness skills, introduction to emotional regulation module. \par -Clinicians will shape and reinforce skillful behaviors for client to generalize in their environment i.e. Homework review, role playing, etc. \par \par GOAL # 1 OBJECTIVE: 2: Desirae will implement mindfulness skills with goal of being in the present moment. She will utilize grounding techniques and other skills if aware of being in the future. \par Service Description/ Modality: Skills Group/ Individual therapy/ Skills Coaching \par Frequency: Weekly skills group sessions and weekly individual therapy sessions. \par Responsible provider: ERIC BETHEA \par Interventions: \par -Clinician will provide shaping, reinforcing for mindfulness skills in sessions. \par -Clinician will explore examples of mindfulness skills practice along with outcomes. \par -Clinician will conduct mindfulness exercises at the start of each skills group to promote and reinforce skills concepts/ effectiveness. \par \par GOAL # 1 OBJECTIVE-3: Desirae will contact therapist if emotional intensity is rated at a 3 or higher for skills coaching. \par Service Description/ Modality: Skills Coaching \par Frequency: As indicated based on emotional intensity/ difficulty problem-solving/ implementing skills \par Responsible provider: ERIC BETHEA \par -Clinician will offer/ encourage use of skills/ crisis coaching when level of emotional intensity exceeds a 3. \par -Clinician will provide skills/ crisis coaching with emphasis on identification/ reinforcement of distress tolerance skills in the moment to assist with goal of toleration/ management of self. \par \par GOAL # 1 OBJECTIVE-4: Desirae will continue to attend appointments with psychiatrist and comply with medication as prescribed to support implementation of PLEASE skill. \par Service Description: Medication management \par Frequency: Monthly or as indicated \par Provider: Dr. Dunn \par Interventions: \par - Patient will attend monthly (or as indicated) medication management appointments. \par - Patient will take medications as prescribed. \par \par How will the provider/individual/guardian know that level of care change is warranted? \par  Upon completion of at least 90 % attendance of weekly skills group and individual treatment, client will have: \par -Have attained and are utilizing a skill set to manage self-injurious/ target behaviors. \par -An average distress tolerance level on weekly diary card at 2 or below. \par -Improvement in interpersonal effectiveness skills as monitored by self-report and observed by DBT team. \par \par Discharge Plan- Indicate the anticipated plan for discharge, including Tx., support services, community resources. For [facility] programs, include a description of a substance abuse relapse prevention plan. \par \par Upon completion of agreement for DBT program which entails Skills Group and Individual Therapy, patient will be evaluated for necessity of services and/or referral to outside services. \par \par Individual has participated in the development of this plan [Yes] No, provide reason: \par Other (s) participated in the development of this plan Yes [No] If Yes, List Names  [de-identified] : Session environment conducive to validating, non-judgmental setting. Therapist and patient reviewed completed diary card. Therapist provided cumulative DBT skills shaping/ reinforcing, as recent crisis situation was reviewed (and ability to manage/ tolerate highlighted). Therapist and patient collaboratively reviewed/ completed safety plan in session, reinforcing DBT skills to support toleration of emotional intensity/ crisis.  \par \par  Patient attended virtual session today as scheduled. Patient was active participant in session as safety plan was reviewed/ devised collaboratively. Patient demonstrated improved awareness of triggers, warning signs and DBT-based coping skills to utilize to support goal of tolerating crisis. She shared about barriers related to willfulness and was involved in brainstorming solutions related to this, devising plan  in response to this. At time of session patient denied experiencing any triggers or warning signs. At the time of session today, patient reported that her passive suicidal ideations are "much lower in intensity" and "shorter in duration" a as compared to when she started treatment. She reported experiencing these thoughts approximately once weekly as opposed to daily/ throughout the day; reporting them as manageable with DBT skills application. She reported compliance with medication as prescribed.  \par  [FreeTextEntry1] : Patient will utilize skills as identified on safety plan in response to triggers/ warning signs.\par She requested that completed safety plan be scanned and emailed to her, as session today took place virtually. \par Patient will continue to complete DBT diary card daily which will be reviewed biweekly individual sessions in DBT aftercare.\par Patient will outreach therapist for crisis skills coaching, if needed. \par Patient will comply with medication as prescribed.\par

## 2022-11-23 NOTE — DISCUSSION/SUMMARY
[1. Helpful Person/Contact Number: _____] : 1. Helpful Person/Contact Number: [unfilled] [2. Helpful Person/Contact Number: _____] : 2. Helpful Person/Contact Number: [unfilled] [a. Clinician Name/Contact Information: _____] : Clinician Name/Contact Information: [unfilled] [b. Clinician Name/Contact Information: _____] : Clinician Name/Contact Information: [unfilled] [c. Local ED/Urgent Care Services/Hotlines (Name/Address/Phone):] : Local ED/Urgent Care Services/Hotlines (Name/Address/Phone): [d. Suicide Prevention Lifeline Phone: 4-211-883-TALK ([phone]) ] : Suicide Prevention Lifeline Phone: 2-286-549-PKHQ ([phone])  [e. Suicide Prevention TEXT 635853] : Suicide Prevention TEXT 897690 [g. Suicide & Crisis Lifeline - send a text or make a call to 988] : Suicide & Crisis Lifeline - send a text or make a call to 988 [FreeTextEntry1] : 11/23/22 [FreeTextEntry2] : -Just prior to/ during monthly cycle (increased emotional vulnerabilities)\par - Ruminating on negative thoughts/ negative self-talk\par - Perceived invalidation/ someone making a critical comment towards me\par - Perceived feelings of rejection\par - Feeling disrespected/ unappreciated [FreeTextEntry3] : - Avoidant/ isolating tendencies (cancelling on commitments)\par - Increased negative self-talk \par - Increased emotions of shame/ sadness/ fear\par - Urges to self-harm/ suicidal ideations  [FreeTextEntry4] : DBT based- skills plan in response to triggers/ warning signs:\par -radical acceptance/ validation of vulnerabilities pertaining to monthly cycle\par - -self-soothing and radical acceptance to support goal of toleration\par - Pros and Cons pertaining to ruminating/ negative self-talk\par - Cognitive distortion awareness and DBT skills planning\par - Dialectical perspective\par - opposite action in response to ineffective urges\par - STOP to prevent impulsive actions/ wise mind practices\par - use of imagery to cope\par -checking the fact in response in response to "perceived feelings/ assumptions"\par - PLEASE skill to reduce emotional vulnerabilities \par - [place] ahead in response to potentially emotional situation\par - Self-soothing/ Pros and Cons in response to ineffective behaviors\par - Distract by pushing away passive S/I, changing thoughts  [FreeTextEntry5] : -Frederic, boyfriend\par -Mom\par -In the car driving around\par -Walking hartman [de-identified] : Psychiatrist on call- [phone]\[facility] Crisis Team- [phone] [de-identified] : Patient denied any safety risks pertaining to her environment. She no longer feels that mom needs to be dispensing medication to her due to her ability/ willingness to use skills and management over ineffective behaviors and passive suicidal thoughts (which are reduced in frequency and intensity as compared to time of initial safety plan development).  [de-identified] : Patient identified potential barrier of willfulness.  [de-identified] : Patient will use opposite action to promote willingness. She agreed to utilize DBT binder to promote skill use and call for coaching if she remains stuck.\par \par  In the event of crisis, I will try to implement the internal coping strategies and contact social support persons identified above. If this is not helpful, I agree to contact my mental health treatment providers listed above for assistance. In the event of an emergency, I agree to call 911 or present to my nearest emergency room for assistance/ evaluation. [de-identified] : "My future- career, family, etc."

## 2022-12-02 ENCOUNTER — APPOINTMENT (OUTPATIENT)
Dept: PSYCHIATRY | Facility: CLINIC | Age: 27
End: 2022-12-02

## 2022-12-02 ENCOUNTER — OUTPATIENT (OUTPATIENT)
Dept: OUTPATIENT SERVICES | Facility: HOSPITAL | Age: 27
LOS: 1 days | Discharge: HOME | End: 2022-12-02

## 2022-12-05 DIAGNOSIS — F33.2 MAJOR DEPRESSIVE DISORDER, RECURRENT SEVERE WITHOUT PSYCHOTIC FEATURES: ICD-10-CM

## 2022-12-05 DIAGNOSIS — F60.3 BORDERLINE PERSONALITY DISORDER: ICD-10-CM

## 2022-12-07 ENCOUNTER — APPOINTMENT (OUTPATIENT)
Dept: PSYCHIATRY | Facility: CLINIC | Age: 27
End: 2022-12-07

## 2022-12-09 ENCOUNTER — OUTPATIENT (OUTPATIENT)
Dept: OUTPATIENT SERVICES | Facility: HOSPITAL | Age: 27
LOS: 1 days | Discharge: HOME | End: 2022-12-09

## 2022-12-09 ENCOUNTER — APPOINTMENT (OUTPATIENT)
Dept: PSYCHIATRY | Facility: CLINIC | Age: 27
End: 2022-12-09

## 2022-12-09 DIAGNOSIS — F60.3 BORDERLINE PERSONALITY DISORDER: ICD-10-CM

## 2022-12-09 DIAGNOSIS — F33.2 MAJOR DEPRESSIVE DISORDER, RECURRENT SEVERE WITHOUT PSYCHOTIC FEATURES: ICD-10-CM

## 2022-12-14 NOTE — PLAN
[Dialectical Behavior Therapy] : Dialectical Behavior Therapy  [Psychoeducation] : Psychoeducation  [Skills training (all types)] : Skills training (all types)  [Recommended Frequency of Visits: ____] : Recommended frequency of visits: [unfilled] [Return in ____ week(s)] : Return in [unfilled] week(s) [FreeTextEntry2] : GOAL # 1 OBJECTIVE- 1: Desirae will learn/ implement 3 skills to tolerate unwanted emotions/ urges to prevent emotional intensity from increasing/ mitigate crisis daily. \par Service Description/ Modality: Skills Group/ Individual therapy/ Skills Coaching \par Frequency: Weekly skills group sessions and weekly individual therapy sessions. \par Responsible provider: ERIC BETHEA \par Interventions: \par -Clinicians will provide a validating environment promoting a nonjudgmental atmosphere of self and an understanding of self along with understanding of dialectics in acceptance and change. \par -Clinicians will conduct weekly psycho-educational skills group on: distress tolerance module, review of mindfulness skills, introduction to emotional regulation module. \par -Clinicians will shape and reinforce skillful behaviors for client to generalize in their environment i.e. Homework review, role playing, etc. \par \par GOAL # 1 OBJECTIVE: 2: Desirae will implement mindfulness skills with goal of being in the present moment. She will utilize grounding techniques and other skills if aware of being in the future. \par Service Description/ Modality: Skills Group/ Individual therapy/ Skills Coaching \par Frequency: Weekly skills group sessions and weekly individual therapy sessions. \par Responsible provider: ERIC BETHEA \par Interventions: \par -Clinician will provide shaping, reinforcing for mindfulness skills in sessions. \par -Clinician will explore examples of mindfulness skills practice along with outcomes. \par -Clinician will conduct mindfulness exercises at the start of each skills group to promote and reinforce skills concepts/ effectiveness. \par \par GOAL # 1 OBJECTIVE-3: Desirae will contact therapist if emotional intensity is rated at a 3 or higher for skills coaching. \par Service Description/ Modality: Skills Coaching \par Frequency: As indicated based on emotional intensity/ difficulty problem-solving/ implementing skills \par Responsible provider: ERIC BETHEA \par -Clinician will offer/ encourage use of skills/ crisis coaching when level of emotional intensity exceeds a 3. \par -Clinician will provide skills/ crisis coaching with emphasis on identification/ reinforcement of distress tolerance skills in the moment to assist with goal of toleration/ management of self. \par \par GOAL # 1 OBJECTIVE-4: Desirae will continue to attend appointments with psychiatrist and comply with medication as prescribed to support implementation of PLEASE skill. \par Service Description: Medication management \par Frequency: Monthly or as indicated \par Provider: Dr. Dunn \par Interventions: \par - Patient will attend monthly (or as indicated) medication management appointments. \par - Patient will take medications as prescribed. \par \par How will the provider/individual/guardian know that level of care change is warranted? \par  Upon completion of at least 90 % attendance of weekly skills group and individual treatment, client will have: \par -Have attained and are utilizing a skill set to manage self-injurious/ target behaviors. \par -An average distress tolerance level on weekly diary card at 2 or below. \par -Improvement in interpersonal effectiveness skills as monitored by self-report and observed by DBT team. \par \par Discharge Plan- Indicate the anticipated plan for discharge, including Tx., support services, community resources. For [facility] programs, include a description of a substance abuse relapse prevention plan. \par \par Upon completion of agreement for DBT program which entails Skills Group and Individual Therapy, patient will be evaluated for necessity of services and/or referral to outside services. \par \par Individual has participated in the development of this plan [Yes] No, provide reason: \par Other (s) participated in the development of this plan Yes [No] If Yes, List Names  [de-identified] : Session environment conducive to validating, non-judgmental setting. Therapist and patient reviewed completed diary card. Therapist provided cumulative DBT skills shaping/ reinforcing, honing in on recent prompting event, emotion elicited and use of skills to problem solve in response (i.e.: DEAR MAN, GIVE, FAST). Therapist modeled use of interpersonal effectiveness skills during role-playing exercise. \par \par Patient attended virtual session today as scheduled. She reported a decrease in emotional intensity as compared to coaching session last week, denying the presence of any passive or active suicidal ideations, plan or intent at the time of session. She reported still being upset and "frustrated" in response to the prompting event, adding that she has been "pushing it away"/ tolerating it, but feels ready to address it. She was able to accept and validate her emotion regarding this, as opposed to rejecting/ judging it. Patient was an active participant in session today. She identified goals pertaining to objective, relationship and self- respect and was able to prioritize them accordingly. She was participant in role playing exercise where interpersonal effectiveness strategies were modeled by therapist. She appeared receptive to interventions utilized in session, reporting the ability to see the situations from "different points of view" (dialectical perspective). Patient was able to see the value/ effectiveness of maintaining her self-respect even if she does not get her request met.  She was also able to identify several other ways to problem solve if her plan to interpersonal skills plan does not get her objective met. She reported compliance with medication as prescribed.  \par  [FreeTextEntry1] : Patient will formulate script with interpersonal effective skills as reviewed in session.\par Patient will continue to complete DBT diary card daily which will be reviewed biweekly individual sessions in DBT aftercare.\par Patient will outreach therapist for crisis skills coaching, if needed. \par Patient will comply with medication as prescribed.\par

## 2022-12-14 NOTE — RISK ASSESSMENT
[Yes, patient reports ideation or behavior] : Yes, patient reports ideation or behavior [No, patient denies ideation or behavior] : No, patient denies ideation or behavior [Yes] : Yes [No] : No [Yes, more than three months ago] : Yes, more than three months ago [FreeTextEntry8] : Patient reported that suicidal ideations had decreased in frequency/ intensity as compared to last week's coaching session. At the time of session today, patient denied the presence of suicidal thoughts, plan or intent.

## 2022-12-14 NOTE — REASON FOR VISIT
[Home] : at home, [unfilled] , at the time of the visit. [Medical Office: ([facility])___] : at the medical office located in  [Self] : self [Patient] : Patient [FreeTextEntry1] : Continuation of mental health treatment.

## 2022-12-15 ENCOUNTER — OUTPATIENT (OUTPATIENT)
Dept: OUTPATIENT SERVICES | Facility: HOSPITAL | Age: 27
LOS: 1 days | Discharge: HOME | End: 2022-12-15

## 2022-12-15 ENCOUNTER — APPOINTMENT (OUTPATIENT)
Dept: PSYCHIATRY | Facility: CLINIC | Age: 27
End: 2022-12-15
Payer: MEDICAID

## 2022-12-15 DIAGNOSIS — F60.3 BORDERLINE PERSONALITY DISORDER: ICD-10-CM

## 2022-12-15 DIAGNOSIS — F33.2 MAJOR DEPRESSIVE DISORDER, RECURRENT SEVERE WITHOUT PSYCHOTIC FEATURES: ICD-10-CM

## 2022-12-15 PROCEDURE — 99214 OFFICE O/P EST MOD 30 MIN: CPT | Mod: 95

## 2022-12-21 ENCOUNTER — OUTPATIENT (OUTPATIENT)
Dept: OUTPATIENT SERVICES | Facility: HOSPITAL | Age: 27
LOS: 1 days | Discharge: HOME | End: 2022-12-21

## 2022-12-21 ENCOUNTER — APPOINTMENT (OUTPATIENT)
Dept: PSYCHIATRY | Facility: CLINIC | Age: 27
End: 2022-12-21
Payer: MEDICAID

## 2022-12-21 ENCOUNTER — APPOINTMENT (OUTPATIENT)
Dept: PSYCHIATRY | Facility: CLINIC | Age: 27
End: 2022-12-21

## 2022-12-21 DIAGNOSIS — F33.2 MAJOR DEPRESSIVE DISORDER, RECURRENT SEVERE WITHOUT PSYCHOTIC FEATURES: ICD-10-CM

## 2022-12-21 DIAGNOSIS — F60.3 BORDERLINE PERSONALITY DISORDER: ICD-10-CM

## 2022-12-21 PROCEDURE — 99214 OFFICE O/P EST MOD 30 MIN: CPT | Mod: 95

## 2022-12-21 NOTE — REASON FOR VISIT
[Home] : at home, [unfilled] , at the time of the visit. [Medical Office: ([facility])___] : at the medical office located in  [Patient] : the patient [Self] : self

## 2022-12-21 NOTE — END OF VISIT
[Duration of Psychotherapy Visit (minutes spent in synchronous communication): ____] : Duration of Psychotherapy Visit (minutes spent in synchronous communication): [unfilled] [Individual Psychotherapy for 53+ minutes] : Individual Psychotherapy for 53+ minutes  [Teletherapy Service Provided] : The services provided in this session were delivered via tele-therapy [Licensed Clinician] : Licensed Clinician

## 2022-12-21 NOTE — PLAN
[Dialectical Behavior Therapy] : Dialectical Behavior Therapy  [Psychoeducation] : Psychoeducation  [Skills training (all types)] : Skills training (all types)  [Recommended Frequency of Visits: ____] : Recommended frequency of visits: [unfilled] [Return in ____ week(s)] : Return in [unfilled] week(s) [FreeTextEntry2] : GOAL # 1 OBJECTIVE- 1: Desirae will learn/ implement 3 skills to tolerate unwanted emotions/ urges to prevent emotional intensity from increasing/ mitigate crisis daily. \par Service Description/ Modality: Skills Group/ Individual therapy/ Skills Coaching \par Frequency: Weekly skills group sessions and weekly individual therapy sessions. \par Responsible provider: ERIC BETHEA \par Interventions: \par -Clinicians will provide a validating environment promoting a nonjudgmental atmosphere of self and an understanding of self along with understanding of dialectics in acceptance and change. \par -Clinicians will conduct weekly psycho-educational skills group on: distress tolerance module, review of mindfulness skills, introduction to emotional regulation module. \par -Clinicians will shape and reinforce skillful behaviors for client to generalize in their environment i.e. Homework review, role playing, etc. \par \par GOAL # 1 OBJECTIVE: 2: Desirae will implement mindfulness skills with goal of being in the present moment. She will utilize grounding techniques and other skills if aware of being in the future. \par Service Description/ Modality: Skills Group/ Individual therapy/ Skills Coaching \par Frequency: Weekly skills group sessions and weekly individual therapy sessions. \par Responsible provider: ERIC BETHEA \par Interventions: \par -Clinician will provide shaping, reinforcing for mindfulness skills in sessions. \par -Clinician will explore examples of mindfulness skills practice along with outcomes. \par -Clinician will conduct mindfulness exercises at the start of each skills group to promote and reinforce skills concepts/ effectiveness. \par \par GOAL # 1 OBJECTIVE-3: Desirae will contact therapist if emotional intensity is rated at a 3 or higher for skills coaching. \par Service Description/ Modality: Skills Coaching \par Frequency: As indicated based on emotional intensity/ difficulty problem-solving/ implementing skills \par Responsible provider: ERIC BETHEA \par -Clinician will offer/ encourage use of skills/ crisis coaching when level of emotional intensity exceeds a 3. \par -Clinician will provide skills/ crisis coaching with emphasis on identification/ reinforcement of distress tolerance skills in the moment to assist with goal of toleration/ management of self. \par \par GOAL # 1 OBJECTIVE-4: Desirae will continue to attend appointments with psychiatrist and comply with medication as prescribed to support implementation of PLEASE skill. \par Service Description: Medication management \par Frequency: Monthly or as indicated \par Provider: Dr. Dunn \par Interventions: \par - Patient will attend monthly (or as indicated) medication management appointments. \par - Patient will take medications as prescribed. \par \par How will the provider/individual/guardian know that level of care change is warranted? \par  Upon completion of at least 90 % attendance of weekly skills group and individual treatment, client will have: \par -Have attained and are utilizing a skill set to manage self-injurious/ target behaviors. \par -An average distress tolerance level on weekly diary card at 2 or below. \par -Improvement in interpersonal effectiveness skills as monitored by self-report and observed by DBT team. \par \par Discharge Plan- Indicate the anticipated plan for discharge, including Tx., support services, community resources. For [facility] programs, include a description of a substance abuse relapse prevention plan. \par \par Upon completion of agreement for DBT program which entails Skills Group and Individual Therapy, patient will be evaluated for necessity of services and/or referral to outside services. \par \par Individual has participated in the development of this plan [Yes] No, provide reason: \par Other (s) participated in the development of this plan Yes [No] If Yes, List Names  [de-identified] : Session environment conducive to validating, non-judgmental setting. Therapist and patient reviewed completed diary card.  Therapist addressed patient's concerns pertaining to "involuntary muscle spams" and devised plan involving Dr. Dunn. Therapist honed in on factors contributing to anxiety, including feeling "stuck" at work related to lack of problem-solving due to catastrophic thinking patterns. Therapist utilized provided DBT skills shaping/ reinforcing, working with patient to begin interpersonal script to support her important request. Therapist continued to model use of skills during role-playing exercise. Therapist reinforced value of working opposite to avoidant urges to become "unstuck" and enhance her self respect. \par \par Patient attended virtual session today as scheduled. She shared concerns pertaining to experiencing "involuntary muscle spasms," reporting how muscle constricting and shaking consistently have been impacting her walking, typing and overall functioning. Patient noted this is something she has never experienced before and that this did not occur prior to recent addition to medication regimen (Lexapro).  She was agreeable to appointment provided to address this with Dr. Dunn later on today. Patient also reported being "super anxious" lately, reflecting on vulnerabilities associated with this time of year (birthday and holidays). She noted that her birthday this year was a "good day," sharing meaningful events with therapist. Patient went on to identify feeling "stuck" at work as another factor impacting her anxiety. She shared that she has not taken any further steps to create interpersonal effectiveness script or plan to make important request due to assuming the worst case scenario (that nothing will change). Patient was active participant in session today. She was able to see value of reviewing interpersonal effectiveness skills to cope ahead. She identified value in waiting until after the holidays (she is more vulnerable and in emotion mind) to have conversation, but did verbalize motivation to do so then. Patient was involved in formulating script supporting objectives effectiveness DEAR MAN along with skills to support the relationship. She noted this as helpful.  She reported compliance with medication as prescribed.  \par  [FreeTextEntry1] : Patient will continue to work on script with interpersonal effective skills as reviewed in session.\par Patient will continue to complete DBT diary card daily which will be reviewed biweekly individual sessions in DBT aftercare.\par Patient will outreach therapist for crisis skills coaching, if needed. \par Patient will comply with medication as prescribed.\par \par Patient will meet with Dr. Dunn today to address concerns pertaining to symptoms she is related to recent medication change. \par

## 2022-12-22 ENCOUNTER — OUTPATIENT (OUTPATIENT)
Dept: OUTPATIENT SERVICES | Facility: HOSPITAL | Age: 27
LOS: 1 days | Discharge: HOME | End: 2022-12-22

## 2022-12-22 ENCOUNTER — APPOINTMENT (OUTPATIENT)
Dept: PSYCHIATRY | Facility: CLINIC | Age: 27
End: 2022-12-22
Payer: MEDICAID

## 2022-12-22 DIAGNOSIS — F60.3 BORDERLINE PERSONALITY DISORDER: ICD-10-CM

## 2022-12-22 DIAGNOSIS — F33.2 MAJOR DEPRESSIVE DISORDER, RECURRENT SEVERE WITHOUT PSYCHOTIC FEATURES: ICD-10-CM

## 2022-12-22 PROCEDURE — 99214 OFFICE O/P EST MOD 30 MIN: CPT

## 2022-12-23 ENCOUNTER — NON-APPOINTMENT (OUTPATIENT)
Age: 27
End: 2022-12-23

## 2022-12-27 ENCOUNTER — OUTPATIENT (OUTPATIENT)
Dept: OUTPATIENT SERVICES | Facility: HOSPITAL | Age: 27
LOS: 1 days | Discharge: HOME | End: 2022-12-27

## 2022-12-27 ENCOUNTER — APPOINTMENT (OUTPATIENT)
Dept: PSYCHIATRY | Facility: CLINIC | Age: 27
End: 2022-12-27

## 2022-12-27 DIAGNOSIS — F60.3 BORDERLINE PERSONALITY DISORDER: ICD-10-CM

## 2022-12-27 DIAGNOSIS — F33.2 MAJOR DEPRESSIVE DISORDER, RECURRENT SEVERE WITHOUT PSYCHOTIC FEATURES: ICD-10-CM

## 2022-12-27 PROCEDURE — 99214 OFFICE O/P EST MOD 30 MIN: CPT | Mod: 95

## 2022-12-28 ENCOUNTER — APPOINTMENT (OUTPATIENT)
Dept: PSYCHIATRY | Facility: CLINIC | Age: 27
End: 2022-12-28

## 2022-12-28 ENCOUNTER — OUTPATIENT (OUTPATIENT)
Dept: OUTPATIENT SERVICES | Facility: HOSPITAL | Age: 27
LOS: 1 days | Discharge: HOME | End: 2022-12-28

## 2022-12-28 DIAGNOSIS — F60.3 BORDERLINE PERSONALITY DISORDER: ICD-10-CM

## 2022-12-28 DIAGNOSIS — F33.2 MAJOR DEPRESSIVE DISORDER, RECURRENT SEVERE WITHOUT PSYCHOTIC FEATURES: ICD-10-CM

## 2022-12-28 NOTE — PLAN
[Medication education provided] : Medication education provided. [Rationale for medication choices, possible risks/precautions, benefits, alternative treatment choices, and consequences of non-treatment discussed] : Rationale for medication choices, possible risks/precautions, benefits, alternative treatment choices, and consequences of non-treatment discussed with patient/family/caregiver  [FreeTextEntry4] : GOAL # 1 OBJECTIVE-4: Desirae will continue to attend appointments with psychiatrist and comply with medication as prescribed to support implementation of PLEASE skill. \par Service Description: Medication management \par Frequency: Monthly or as indicated \par Provider: Dr. Dunn \par  [FreeTextEntry5] : Continue Wellbutrin 300mg daily\par Follow up in 4 weeks\par behavioral activation was discussed as a way to support mood\par

## 2022-12-28 NOTE — HISTORY OF PRESENT ILLNESS
[FreeTextEntry1] : Desirae was seen for psychiatric follow-up.  Upon interview she reported feeling much better than when we met last week for assessment of possible serotonin syndrome.  She stated that since stopping the Lexapro her muscle tightness and leg shaking have resolved almost 100% with only a few intermittent periods of muscle contraction.  She denied feeling overheated or experiencing other symptoms consistent with serotonin syndrome.  She reported her mood has been "good" and stable. She denied sleeping problems. She denied feeling depressed or hopeless. She denied safety concerns such as thoughts of wanting to harm herself or anyone else.

## 2022-12-28 NOTE — DISCUSSION/SUMMARY
[FreeTextEntry1] : Desirae Edmonds is a 26 year old single  Female, domiciled with family, currently unemployed, with past psychiatric history of Major Depressive Disorder, Borderline Personality Disorder, Anxiety, multiple prior inpatient psychiatric admissions, prior suicide attempts by OD, most recently in outpatient treatment privately with Dr. Cross and Ms. Avril Stokes in the community, who was admitted to ICU and intubated after OD on Bupropion, Effexor, and Trileptal in a suicide attempt, admitted to [facility] IPP from 11/4/21 to 11/10/21, subsequently attended partial hospitalization program (PHP) here at [facility] for aftercare in 12/2021, who presents today for routine med mgmt follow up. \par \par Most recently, Desirae was experiencing low mood and a trial of Lexapro was started. However, with titration to 10mg daily, she began experiencing symptoms concerning for possible serotonin syndrome.  Lexapro was stopped and symptoms have now subsided.  She reports that her mood is better and denies symptoms of depression, hopelessness or thoughts of suicide.

## 2023-01-03 NOTE — PLAN
[Dialectical Behavior Therapy] : Dialectical Behavior Therapy  [Psychoeducation] : Psychoeducation  [Skills training (all types)] : Skills training (all types)  [Recommended Frequency of Visits: ____] : Recommended frequency of visits: [unfilled] [Return in ____ week(s)] : Return in [unfilled] week(s) [FreeTextEntry2] : GOAL # 1 OBJECTIVE- 1: Desirae will learn/ implement 3 skills to tolerate unwanted emotions/ urges to prevent emotional intensity from increasing/ mitigate crisis daily. \par Service Description/ Modality: Skills Group/ Individual therapy/ Skills Coaching \par Frequency: Weekly skills group sessions and weekly individual therapy sessions. \par Responsible provider: ERIC BETHEA \par Interventions: \par -Clinicians will provide a validating environment promoting a nonjudgmental atmosphere of self and an understanding of self along with understanding of dialectics in acceptance and change. \par -Clinicians will conduct weekly psycho-educational skills group on: distress tolerance module, review of mindfulness skills, introduction to emotional regulation module. \par -Clinicians will shape and reinforce skillful behaviors for client to generalize in their environment i.e. Homework review, role playing, etc. \par \par GOAL # 1 OBJECTIVE: 2: Desirae will implement mindfulness skills with goal of being in the present moment. She will utilize grounding techniques and other skills if aware of being in the future. \par Service Description/ Modality: Skills Group/ Individual therapy/ Skills Coaching \par Frequency: Weekly skills group sessions and weekly individual therapy sessions. \par Responsible provider: ERIC BETHEA \par Interventions: \par -Clinician will provide shaping, reinforcing for mindfulness skills in sessions. \par -Clinician will explore examples of mindfulness skills practice along with outcomes. \par -Clinician will conduct mindfulness exercises at the start of each skills group to promote and reinforce skills concepts/ effectiveness. \par \par GOAL # 1 OBJECTIVE-3: Desirae will contact therapist if emotional intensity is rated at a 3 or higher for skills coaching. \par Service Description/ Modality: Skills Coaching \par Frequency: As indicated based on emotional intensity/ difficulty problem-solving/ implementing skills \par Responsible provider: ERIC BETHEA \par -Clinician will offer/ encourage use of skills/ crisis coaching when level of emotional intensity exceeds a 3. \par -Clinician will provide skills/ crisis coaching with emphasis on identification/ reinforcement of distress tolerance skills in the moment to assist with goal of toleration/ management of self. \par \par GOAL # 1 OBJECTIVE-4: Desirae will continue to attend appointments with psychiatrist and comply with medication as prescribed to support implementation of PLEASE skill. \par Service Description: Medication management \par Frequency: Monthly or as indicated \par Provider: Dr. Dunn \par Interventions: \par - Patient will attend monthly (or as indicated) medication management appointments. \par - Patient will take medications as prescribed. \par \par How will the provider/individual/guardian know that level of care change is warranted? \par  Upon completion of at least 90 % attendance of weekly skills group and individual treatment, client will have: \par -Have attained and are utilizing a skill set to manage self-injurious/ target behaviors. \par -An average distress tolerance level on weekly diary card at 2 or below. \par -Improvement in interpersonal effectiveness skills as monitored by self-report and observed by DBT team. \par \par Discharge Plan- Indicate the anticipated plan for discharge, including Tx., support services, community resources. For [facility] programs, include a description of a substance abuse relapse prevention plan. \par \par Upon completion of agreement for DBT program which entails Skills Group and Individual Therapy, patient will be evaluated for necessity of services and/or referral to outside services. \par \par Individual has participated in the development of this plan [Yes] No, provide reason: \par Other (s) participated in the development of this plan Yes [No] If Yes, List Names  [de-identified] : Session environment conducive to validating, non-judgmental setting. Therapist obtained update from patient pertaining to outcome of in-person appointment last week with Dr. Dunn. Therapist honed in on increased anxiety reported by patients, looking to obtain concrete facts. Therapist worked with patient to explore multiple options for problem-solving current problem. Therapist attempted to connect value of willingness skills to assist with goal of toleration. Therapist explored the role of avoidance (due to ongoing stressors) as factor. \par \par Patient attended virtual session as scheduled. She reported “the last few days” as more anxious. She shared update pertaining to concerns identified during last session, reporting that during meeting with Dr. Dunn, it was determined that she had serotonin syndrome and her Lexapro medication was discontinued as a result. Due to impact of serotonin syndrome, patient has been unable to drive and therefore has missed work for almost 2 weeks.  \par \par Patient completed her diary card, which showed patterns of increased feelings of fear/ anxiety along with an increase in negative thoughts. At session today, patient presented as tearful. She reported an increase in suicidal ideations but denied any specific plan or intention at the time of session today. She reported urges to self- harm but has used skills to prevent action on these urges. Patient acknowledged difficulty gaining control over negative thoughts but was resistant to efforts made by therapist to look at thoughts dialectically. She shared about continued urges of avoidance, reporting intention to call out of work today (although physically able to drive/ attend) and possibly quitting; verbalizing “I just want to stay in my room.” Patient demonstrated willfulness and when challenged with problem-solving alternatives by therapist, repeatedly stating “I don’t care.” The session ended over the 45 minute jewell at the patient's request. She agreed to contact therapist on Friday afternoon to provide update. She was agreeable to calling therapist in between sessions in the event of increased crisis, difficulty managing self or safety concerns.   [FreeTextEntry1] : Patient will contact therapist by phone on Friday at 1:00PM to provide update.\par She agreed to contact therapist in the event of safety concerns.\par Patient will continue to complete DBT diary card daily which will be reviewed in individual session.\par Patient will outreach therapist for crisis skills coaching, if needed. \par Patient will comply with medication as prescribed.\par

## 2023-01-03 NOTE — REASON FOR VISIT
[Home] : at home, [unfilled] , at the time of the visit. [Medical Office: ([facility])___] : at the medical office located in  [Patient] : Patient [FreeTextEntry1] : Continuation of mental health treatment (as recent graduate from intensive DBT program).

## 2023-01-03 NOTE — PHYSICAL EXAM
[Cooperative] : cooperative [Depressed] : depressed [Anxious] : anxious [Clear] : clear [Linear/Goal Directed] : linear/goal directed [Depressive] : depressive [None Reported] : none reported [Average] : average [WNL] : within normal limits [de-identified] : Tearful

## 2023-01-05 ENCOUNTER — OUTPATIENT (OUTPATIENT)
Dept: OUTPATIENT SERVICES | Facility: HOSPITAL | Age: 28
LOS: 1 days | Discharge: HOME | End: 2023-01-05

## 2023-01-05 ENCOUNTER — APPOINTMENT (OUTPATIENT)
Dept: PSYCHIATRY | Facility: CLINIC | Age: 28
End: 2023-01-05

## 2023-01-05 DIAGNOSIS — F60.3 BORDERLINE PERSONALITY DISORDER: ICD-10-CM

## 2023-01-05 DIAGNOSIS — F33.2 MAJOR DEPRESSIVE DISORDER, RECURRENT SEVERE WITHOUT PSYCHOTIC FEATURES: ICD-10-CM

## 2023-01-09 VITALS
SYSTOLIC BLOOD PRESSURE: 126 MMHG | BODY MASS INDEX: 18.66 KG/M2 | WEIGHT: 112 LBS | HEART RATE: 92 BPM | DIASTOLIC BLOOD PRESSURE: 84 MMHG | TEMPERATURE: 99 F | HEIGHT: 65 IN

## 2023-01-09 NOTE — PLAN
[FreeTextEntry4] : GOAL # 1 OBJECTIVE-4: Desirae will continue to attend appointments with psychiatrist and comply with medication as prescribed to support implementation of PLEASE skill. \par Service Description: Medication management \par Frequency: Monthly or as indicated \par Provider: Dr. Dunn \par  [FreeTextEntry5] : #). Increase Lexapro from 5mg daily to 10mg daily to address anxiety and mood concerns\par - Continue Wellbutrin 300mg daily for symptoms of depression\par - Continue Vistaril 25mg Qhs PRN insomnia\par - Ativan 0.5mg, PRN panic symptoms\par -Follow up in one week\par \par

## 2023-01-09 NOTE — DISCUSSION/SUMMARY
[FreeTextEntry1] : Desirae Edmonds is a 26 year old single  Female, domiciled with family, currently unemployed, with past psychiatric history of Major Depressive Disorder, Borderline Personality Disorder, Anxiety, multiple prior inpatient psychiatric admissions, prior suicide attempts by OD, most recently in outpatient treatment privately with Dr. Cross and Ms. Avril Stokes in the community, who was admitted to ICU and intubated after OD on Bupropion, Effexor, and Trileptal in a suicide attempt, admitted to [facility] IPP from 11/4/21 to 11/10/21, subsequently attended partial hospitalization program (PHP) here at [facility] for aftercare in 12/2021, who presents today for routine med mgmt follow up. \par \par Today, Desirae was seen in person.  Tremor was noted upon physical exam but otherwise vitals and reflexes were within normal limits.  However given the timing of symptoms and their onset, it is possible she was experiencing serotonin toxicity.  Currently she reports a subjective improvement in symptoms.  Psychoeducation including risks were discussed.  Plan to proceed without SSRI augmentation of her current regimen.  With regard to her mental health, she reports symptoms of anxiety and low mood are slightly improved and she denies any thoughts of wanting to harm herself or anyone else

## 2023-01-09 NOTE — DISCUSSION/SUMMARY
[FreeTextEntry1] : Desirae Edmonds is a 26 year old single  Female, domiciled with family, currently unemployed, with past psychiatric history of Major Depressive Disorder, Borderline Personality Disorder, Anxiety, multiple prior inpatient psychiatric admissions, prior suicide attempts by OD, most recently in outpatient treatment privately with Dr. Cross and Ms. Avril Stokes in the community, who was admitted to ICU and intubated after OD on Bupropion, Effexor, and Trileptal in a suicide attempt, admitted to [facility] IPP from 11/4/21 to 11/10/21, subsequently attended partial hospitalization program (PHP) here at [facility] for aftercare in 12/2021, who presents today for routine med mgmt follow up. \par \par Today Desirae reports having to stop Lexapro due to intolerable side effects which may be concerning for serotonin syndrome.  Patient reports having measured her temperature which was normal.  Patient agrees to present in person tomorrow for further assessment.  No change in mental status reported.  Anxiety and mood symptoms are possibly improving although largely remain unchanged.  No safety concerns with regard to thoughts of suicide or plans/intention to harm oneself.

## 2023-01-09 NOTE — PHYSICAL EXAM
[Cooperative] : cooperative [Depressed] : depressed [Anxious] : anxious [Full] : full [Clear] : clear [Linear/Goal Directed] : linear/goal directed [Average] : average [WNL] : within normal limits [FreeTextEntry8] : "anxious"

## 2023-01-09 NOTE — PLAN
[Yes. details: ___] : Yes, [unfilled] [Medication education provided] : Medication education provided. [FreeTextEntry4] : GOAL # 1 OBJECTIVE-4: Desirae will continue to attend appointments with psychiatrist and comply with medication as prescribed to support implementation of PLEASE skill. \par Service Description: Medication management \par Frequency: Monthly or as indicated \par Provider: Dr. Dunn \par  [FreeTextEntry5] :  \par #). psychoeducation provided, including warning signs and need for close monitoring with possible need for going to the emergency department if symptoms begin or worsen although not likely given SSRI has been discontinued.\par #). Desirae agrees to follow up virtually next week. \par - Continue Wellbutrin 300mg daily for symptoms of depression\par - Continue Vistaril 25mg Qhs PRN insomnia\par - Ativan 0.5mg, PRN panic symptoms\par \par \par

## 2023-01-09 NOTE — DISCUSSION/SUMMARY
[FreeTextEntry1] : Desirae Edmonds is a 26 year old single  Female, domiciled with family, currently unemployed, with past psychiatric history of Major Depressive Disorder, Borderline Personality Disorder, Anxiety, multiple prior inpatient psychiatric admissions, prior suicide attempts by OD, most recently in outpatient treatment privately with Dr. Cross and Ms. Avril Stokes in the community, who was admitted to ICU and intubated after OD on Bupropion, Effexor, and Trileptal in a suicide attempt, admitted to [facility] IPP from 11/4/21 to 11/10/21, subsequently attended partial hospitalization program (PHP) here at [facility] for aftercare in 12/2021, who presents today for routine med mgmt follow up. \par \par Today Desirae reports continued anxiety and worsening mood which she attributes to stress at work.  Lexapro 5 mg was started last week and she denies problems with the medication at this time.  Risks and benefits discussed.  Plan to increase Lexapro to 10 mg was discussed.  No safety concerns at this time as Desirae denies any thoughts of wanting to hurt herself or anyone else.

## 2023-01-09 NOTE — PHYSICAL EXAM
[Cooperative] : cooperative [Depressed] : depressed [Anxious] : anxious [Full] : full [Clear] : clear [Linear/Goal Directed] : linear/goal directed [Average] : average [WNL] : within normal limits [FreeTextEntry5] : telehealth session: Pt reports R leg tremor. [FreeTextEntry8] : "anxious"

## 2023-01-09 NOTE — SOCIAL HISTORY
[FreeTextEntry1] : Cannabis: yes \par Description: reports smoking 1 joint daily for years. She reports starting to smoke at age 18. \par Cocaine: no known use \par Heroin/Opiates: no known use \par Benzodiazepines: no known use \par Other Illicit Drugs/ Prescription/ OTC medications: no known use

## 2023-01-09 NOTE — HISTORY OF PRESENT ILLNESS
[FreeTextEntry1] : Desirae was seen for psychiatric follow-up.  She presented in person at the clinic for a landon assessment of possible serotonin syndrome.  With regard to mood she reported feeling as though her anxiety and low mood have somewhat subsided although still are present.  With regard to physical manifestations of symptoms she was noted to be walking carefully and reported tenseness in her right leg muscles.  A course tremor was noted when ambulating. Slight tremor noted in upper extremities. She reported the shaking has improved since stopping Lexapro. She denied safety concerns such as thoughts of suicide or wanting to harm herself in any way. She reported continued anxiety about her job but stated this is improving as the situation evolves. She denied sleep or appetite disturbances. She reported looking forward to further improvement in her mental and physical health.  [FreeTextEntry2] : past psychiatric history of Major Depressive Disorder, Borderline Personality Disorder, Anxiety, multiple prior inpatient psychiatric admissions, prior suicide attempts by OD, most recently in outpatient treatment privately with Dr. Cross and Ms. Avril Stokes in the community, who was admitted to ICU and intubated after OD on Bupropion, Effexor, and Trileptal in a suicide attempt, admitted to [facility] IPP from 11/4/21 to 11/10/21, subsequently attended partial hospitalization program (PHP) here at [facility] for aftercare in 12/2021. \par Desirae has since completed DBT and has remained stable since with follow up appointments for medication management. \par  [FreeTextEntry3] : Effexor\par Wellbutrin\par Trileptal\par

## 2023-01-09 NOTE — RISK ASSESSMENT
[No, patient denies ideation or behavior] : No, patient denies ideation or behavior [Low acute suicide risk] : Low acute suicide risk [Yes] : Yes [No, Reason: ____] : Safety Plan completed/updated (for individuals at risk): No, Reason: [unfilled]

## 2023-01-09 NOTE — PLAN
[Yes. details: ___] : Yes, [unfilled] [Medication education provided] : Medication education provided. [FreeTextEntry4] : GOAL # 1 OBJECTIVE-4: Desirae will continue to attend appointments with psychiatrist and comply with medication as prescribed to support implementation of PLEASE skill. \par Service Description: Medication management \par Frequency: Monthly or as indicated \par Provider: Dr. Dunn \par  [FreeTextEntry5] : #). Stop Lexapro as symptoms being reported are concerning for possible serotonin syndrome \par #). psychoeducation provided, including warning signs and need for close monitoring with possible need for going to the emergency department if symptoms begin or worsen\par #). Desirae agrees to present to the clinic in person tomorrow for full assessment of symptoms, currently she reports her temperature is normal.\par - Continue Wellbutrin 300mg daily for symptoms of depression\par - Continue Vistaril 25mg Qhs PRN insomnia\par - Ativan 0.5mg, PRN panic symptoms\par \par \par

## 2023-01-09 NOTE — PHYSICAL EXAM
[Tremor] : tremor [Cooperative] : cooperative [Depressed] : depressed [Anxious] : anxious [Full] : full [Clear] : clear [Linear/Goal Directed] : linear/goal directed [Average] : average [WNL] : within normal limits [FreeTextEntry2] : R leg shaking while ambulating [FreeTextEntry3] : 2+ reflexes noted in lower extremities [FreeTextEntry4] : no rigidity, no clonus was elicited in lower extremities [FreeTextEntry8] : "anxious"

## 2023-01-09 NOTE — HISTORY OF PRESENT ILLNESS
[FreeTextEntry1] : Desirae was seen for psychiatric follow-up.  Upon interview she reported continuing to have symptoms of anxiety.  She had been scheduled for an appointment last week but did not show for the appointment.  We then spoke by phone last week at which time she reported increased anxiety and low mood.  Based on that conversation Lexapro 5 mg was sent to the pharmacy with plan to follow up today.\par Desirae reports stress at work which may be contributing to her anxiety.  She described waking up with panic symptoms thinking about work.  Symptoms included rapid thoughts, increased heart rate and worrying about the future.  Today she reports feeling as though she might be getting depressed due to these symptoms.  She stated "maybe I cannot handle things."  She reported the anxiety and mood symptoms started worsening about 1 month ago.  She denied thoughts of harming herself or wanting to end her life.  She denied experiencing problems with the Lexapro 5 mg and agreed to increase to 10 mg. [FreeTextEntry2] : past psychiatric history of Major Depressive Disorder, Borderline Personality Disorder, Anxiety, multiple prior inpatient psychiatric admissions, prior suicide attempts by OD, most recently in outpatient treatment privately with Dr. Cross and Ms. Avril Stokes in the community, who was admitted to ICU and intubated after OD on Bupropion, Effexor, and Trileptal in a suicide attempt, admitted to [facility] IPP from 11/4/21 to 11/10/21, subsequently attended partial hospitalization program (PHP) here at [facility] for aftercare in 12/2021. \par Desirae has since completed DBT and has remained stable since with follow up appointments for medication management. \par  [FreeTextEntry3] : Effexor\par Wellbutrin\par Trileptal\par

## 2023-01-09 NOTE — HISTORY OF PRESENT ILLNESS
[FreeTextEntry1] : Desirae was seen for psychiatric follow-up.  Upon interview she reported concern because she has noticed muscle cramping and tightening in her lower extremities.  She reported shaking and diaphoresis in her palms.  She reported the symptoms started Monday after having increased the dose of Lexapro last Thursday.  Because of this she did not take her dose of Lexapro this morning.  She denied any other changes with regard to medications she is taking.  She reported her mood symptoms have remained about the same and her anxiety still remains although she believes there is some improvement in the symptoms.  She denied any safety concerns including no thoughts of suicide and no thoughts of wanting to harm herself.  She denied feeling hopeless about her future. [FreeTextEntry2] : past psychiatric history of Major Depressive Disorder, Borderline Personality Disorder, Anxiety, multiple prior inpatient psychiatric admissions, prior suicide attempts by OD, most recently in outpatient treatment privately with Dr. Cross and Ms. Avril Stokes in the community, who was admitted to ICU and intubated after OD on Bupropion, Effexor, and Trileptal in a suicide attempt, admitted to [facility] IPP from 11/4/21 to 11/10/21, subsequently attended partial hospitalization program (PHP) here at [facility] for aftercare in 12/2021. \par Desirae has since completed DBT and has remained stable since with follow up appointments for medication management. \par  [FreeTextEntry3] : Effexor\par Wellbutrin\par Trileptal\par

## 2023-01-10 ENCOUNTER — OUTPATIENT (OUTPATIENT)
Dept: OUTPATIENT SERVICES | Facility: HOSPITAL | Age: 28
LOS: 1 days | Discharge: HOME | End: 2023-01-10

## 2023-01-10 ENCOUNTER — APPOINTMENT (OUTPATIENT)
Dept: PSYCHIATRY | Facility: CLINIC | Age: 28
End: 2023-01-10

## 2023-01-10 DIAGNOSIS — F33.2 MAJOR DEPRESSIVE DISORDER, RECURRENT SEVERE WITHOUT PSYCHOTIC FEATURES: ICD-10-CM

## 2023-01-10 DIAGNOSIS — F60.3 BORDERLINE PERSONALITY DISORDER: ICD-10-CM

## 2023-01-11 NOTE — DISCUSSION/SUMMARY
[Initial Plan] : Initial Plan [Able to set and pursue goals] : able to set and pursue goals [Adherent to treatment recommendations] : adherent to treatment recommendations [Attempting to realize their potential] : Attempting to realize their potential [Cognitively intact] : cognitively intact [Insightful] : insightful [Motivated to participate in treatment] : motivated to participate in treatment [Motivated and ready for change] : motivated and ready for change [In good health] : in good health [Part of a supportive family] : part of a supportive family [Housing stability] : housing stability [English fluency] : English fluency [Connected to healthcare] : connected to healthcare [Access to safe outdoor spaces] : access to safe outdoor spaces [Social supports] : social supports [Mental Health] : Mental Health [Initial] : Initial [every ___ months] : every [unfilled] months [every ___ weeks] : every [unfilled] weeks [None - Reason others did not participate:] : None - Reason others did not participate:  [Yes] : Yes [Psychiatric Provider/Prescriber] : Psychiatric Provider/Prescriber [Therapist] : Therapist [FreeTextEntry2] : 4/5/23 [FreeTextEntry3] : 12/21/21 [FreeTextEntry1] : Mood changes/ dysregulation. Difficulty managing thoughts, emotions, behaviors.   [FreeTextEntry4] : Patient interested in learning more about DBT. Patient is able to see some benefits to participating in intensive DBT program. Patient has good compliance in current treatment.  \par \par   [de-identified] : Goal: To assess motivation and commitment to partake in intensive DBT program.   [de-identified] : Patient will attend 90% of psycho-educational appointment scheduled with writer and learn 2 strategies to address therapy-interfering behaviors. [de-identified] : Patient will identify 2 areas in life where new skills could be effective. [FreeTextEntry5] : -Clinician will focus on shaping behavior and expectations of DBT program by emphasizing the importance of consistent compliance with appointments along with the importance of communication should an absence be necessary. \par -Clinician will provide an environment of acceptance and validation as psycho-education on DBT treatment is provided; sharing and connecting the value of DBT skills to client’s life and current circumstances. \par -Clinician will educate client on strategies to develop with compliance. Weekly plan will be created to evaluate, reinforce and shape success with strategies.\par -Clinician will identify/ validate skillful behaviors they utilize in daily living and challenge current ineffective behaviors, helping client to see the value in learning effective skills to replace these behaviors on the path to obtain “a life worth living.”  \par -Clinician will introduce diary card (along with its purpose and function) to review weekly in session to gauge ineffective behavioral patterns and explore readiness for change.    \par \par   [de-identified] : depedent on outcome of pre-commitment phase of DBT program [de-identified] : : The patient expresses improvement in performing activities\par of daily living and/or says progress with initial complaint symptoms. In addition, the treatment team will\par conduct diagnose-based screenings as needed. [de-identified] : : The patient expresses improvement in\par performing activities of daily living and/or says progress with initial complaint symptoms. In addition, the\par treatment team will conduct diagnose-based screenings as needed [de-identified] : The treatment is no longer medically\par necessary when the patient no longer requires individual psychotherapy and/or medication to perform at\par baseline.  [de-identified] : : If the patient is unable to perform activities of daily\par living and/or expresses suicidal ideation, a higher level of care will be considered.  [de-identified] : Other rationales for transition/discharge are granted/applied\par upon the patient DNC, relocate, self-termination, and/or requests for the treatment termination/transfer\par to another facility.

## 2023-01-11 NOTE — RISK ASSESSMENT
[No, patient denies ideation or behavior] : No, patient denies ideation or behavior [Low acute suicide risk] : Low acute suicide risk [Yes] : Safety Plan completed/updated (for individuals at risk): Yes [FreeTextEntry8] : At the time of session, patient denied the presence of suicidal ideations, plan or intent.  [FreeTextEntry3] : Safety plan recently updated but patient is currently at low acute risk evidenced by recent toleration of stressors and improved management of self due to using skills.

## 2023-01-11 NOTE — PLAN
[Dialectical Behavior Therapy] : Dialectical Behavior Therapy  [Psychoeducation] : Psychoeducation  [Skills training (all types)] : Skills training (all types)  [Recommended Frequency of Visits: ____] : Recommended frequency of visits: [unfilled] [Return in ____ week(s)] : Return in [unfilled] week(s) [FreeTextEntry2] : GOAL # 1 OBJECTIVE- 1: Desirae will learn/ implement 3 skills to tolerate unwanted emotions/ urges to prevent emotional intensity from increasing/ mitigate crisis daily. \par Service Description/ Modality: Skills Group/ Individual therapy/ Skills Coaching \par Frequency: Weekly skills group sessions and weekly individual therapy sessions. \par Responsible provider: ERIC BETHEA \par Interventions: \par -Clinicians will provide a validating environment promoting a nonjudgmental atmosphere of self and an understanding of self along with understanding of dialectics in acceptance and change. \par -Clinicians will conduct weekly psycho-educational skills group on: distress tolerance module, review of mindfulness skills, introduction to emotional regulation module. \par -Clinicians will shape and reinforce skillful behaviors for client to generalize in their environment i.e. Homework review, role playing, etc. \par \par GOAL # 1 OBJECTIVE: 2: Desirae will implement mindfulness skills with goal of being in the present moment. She will utilize grounding techniques and other skills if aware of being in the future. \par Service Description/ Modality: Skills Group/ Individual therapy/ Skills Coaching \par Frequency: Weekly skills group sessions and weekly individual therapy sessions. \par Responsible provider: ERIC BETHEA \par Interventions: \par -Clinician will provide shaping, reinforcing for mindfulness skills in sessions. \par -Clinician will explore examples of mindfulness skills practice along with outcomes. \par -Clinician will conduct mindfulness exercises at the start of each skills group to promote and reinforce skills concepts/ effectiveness. \par \par GOAL # 1 OBJECTIVE-3: Desirae will contact therapist if emotional intensity is rated at a 3 or higher for skills coaching. \par Service Description/ Modality: Skills Coaching \par Frequency: As indicated based on emotional intensity/ difficulty problem-solving/ implementing skills \par Responsible provider: ERIC BETHEA \par -Clinician will offer/ encourage use of skills/ crisis coaching when level of emotional intensity exceeds a 3. \par -Clinician will provide skills/ crisis coaching with emphasis on identification/ reinforcement of distress tolerance skills in the moment to assist with goal of toleration/ management of self. \par \par GOAL # 1 OBJECTIVE-4: Desirae will continue to attend appointments with psychiatrist and comply with medication as prescribed to support implementation of PLEASE skill. \par Service Description: Medication management \par Frequency: Monthly or as indicated \par Provider: Dr. Dunn \par Interventions: \par - Patient will attend monthly (or as indicated) medication management appointments. \par - Patient will take medications as prescribed. \par \par How will the provider/individual/guardian know that level of care change is warranted? \par  Upon completion of at least 90 % attendance of weekly skills group and individual treatment, client will have: \par -Have attained and are utilizing a skill set to manage self-injurious/ target behaviors. \par -An average distress tolerance level on weekly diary card at 2 or below. \par -Improvement in interpersonal effectiveness skills as monitored by self-report and observed by DBT team. \par \par Discharge Plan- Indicate the anticipated plan for discharge, including Tx., support services, community resources. For [facility] programs, include a description of a substance abuse relapse prevention plan. \par \par Upon completion of agreement for DBT program which entails Skills Group and Individual Therapy, patient will be evaluated for necessity of services and/or referral to outside services. \par \par Individual has participated in the development of this plan [Yes] No, provide reason: \par Other (s) participated in the development of this plan Yes [No] If Yes, List Names  [de-identified] : Session environment conducive to validating, non-judgmental setting. Therapist provided cumulative DBT skills shaping/ reinforcing as recent events were reviewed and skills implementation discussed. Therapist assisted patient in direct skills application of emotional regulation- 'check the facts' skill, in response to prompting event. Therapist continued to work with patient to challenge ineffective thoughts impacting mood and overall outlook.  Therapist spoke to the importance of consistent daily diary card completion. \par \par Patient attended virtual session as scheduled. She reported that she did not complete diary card this week due to "busy weekend." She shared about a stressor that she "handled fine" and was allowed time to process it. In doing so, she was able to report feeling good about how she handled it, maintaining her self- respect, reporting how the outcome would likely have been different without use of skills. With assistance from therapist, patient was able to identify emotion of anger as fitting when 'checking the facts', acknowledging that an important goal was blocked for her. She was also able to see how other DBT skills supported her effectiveness in response to stressor, including mindfulness skills. Patient continues to struggle with judgmental thoughts, labeling self as a "failure" in response to decision to quit recent job. She appeared receptive to therapeutic interventions utilized in session including challenging ineffective thoughts, able to identify facts to oppose that she is a failure. Patient reported improved mood as compared to last session. At time of session, patient denied the presence of suicidal ideation, plan or intent. She agreed to resume daily diary card completion along with plan to be aware of and challenge ineffective thoughts. Patient also plans to utilize PLEASE skill to prevent emotional vulnerabilities within her control and identify/ follow through on daily mastery goals throughout her routine. She remains in contact with [employer] [facility] to assist with goal of obtaining new employment, taking action daily to support this goal. \par  [FreeTextEntry1] : Patient agreed to identify/ challenge ineffective thoughts as reviewed in session.\par She identified plan to use PLEASE skill and identify daily mastery goals to support routine while continuing to take action steps towards goal of obtaining new employment. \par Patient will  complete DBT diary card daily which will be reviewed in individual session.\par Patient will comply with medication as prescribed.\par She was agreeable to calling therapist in between sessions in the event of increased crisis, difficulty managing self or safety concerns.

## 2023-01-11 NOTE — REASON FOR VISIT
[Self] : self [Patient preference] : as per patient preference [Starting, patient seen in-person within last 6 months] : Telehealth services are being started as patient has seen in-person within last 6 months. [Telehealth (audio & video) - Individual/Group] : This visit was provided via telehealth using real-time 2-way audio visual technology. [Medical Office: ([facility])___] : The provider was located at the medical office in [unfilled]. [Home] : The patient, [unfilled], was located at home, [unfilled], at the time of the visit. [Verbal consent obtained from patient/other participant(s)] : Verbal consent for telehealth/telephonic services obtained from patient/other participant(s) [Patient] : Patient [FreeTextEntry4] : 11:00AM [FreeTextEntry5] : 12:00PM [FreeTextEntry1] : Continuation of mental health treatment (as recent graduate of intensive DBT program).

## 2023-01-18 ENCOUNTER — APPOINTMENT (OUTPATIENT)
Dept: PSYCHIATRY | Facility: CLINIC | Age: 28
End: 2023-01-18

## 2023-01-18 ENCOUNTER — OUTPATIENT (OUTPATIENT)
Dept: OUTPATIENT SERVICES | Facility: HOSPITAL | Age: 28
LOS: 1 days | Discharge: HOME | End: 2023-01-18

## 2023-01-18 DIAGNOSIS — F60.3 BORDERLINE PERSONALITY DISORDER: ICD-10-CM

## 2023-01-18 DIAGNOSIS — F33.2 MAJOR DEPRESSIVE DISORDER, RECURRENT SEVERE WITHOUT PSYCHOTIC FEATURES: ICD-10-CM

## 2023-01-18 NOTE — PLAN
[Dialectical Behavior Therapy] : Dialectical Behavior Therapy  [Psychoeducation] : Psychoeducation  [Skills training (all types)] : Skills training (all types)  [FreeTextEntry2] : GOAL # 1 OBJECTIVE- 1: Desirae will learn/ implement 3 skills to tolerate unwanted emotions/ urges to prevent emotional intensity from increasing/ mitigate crisis daily. \par Service Description/ Modality: Skills Group/ Individual therapy/ Skills Coaching \par Frequency: Weekly skills group sessions and weekly individual therapy sessions. \par Responsible provider: ERIC BETHEA \par Interventions: \par -Clinicians will provide a validating environment promoting a nonjudgmental atmosphere of self and an understanding of self along with understanding of dialectics in acceptance and change. \par -Clinicians will conduct weekly psycho-educational skills group on: distress tolerance module, review of mindfulness skills, introduction to emotional regulation module. \par -Clinicians will shape and reinforce skillful behaviors for client to generalize in their environment i.e. Homework review, role playing, etc. \par \par GOAL # 1 OBJECTIVE: 2: Desirae will implement mindfulness skills with goal of being in the present moment. She will utilize grounding techniques and other skills if aware of being in the future. \par Service Description/ Modality: Skills Group/ Individual therapy/ Skills Coaching \par Frequency: Weekly skills group sessions and weekly individual therapy sessions. \par Responsible provider: ERIC EBTHEA \par Interventions: \par -Clinician will provide shaping, reinforcing for mindfulness skills in sessions. \par -Clinician will explore examples of mindfulness skills practice along with outcomes. \par -Clinician will conduct mindfulness exercises at the start of each skills group to promote and reinforce skills concepts/ effectiveness. \par \par GOAL # 1 OBJECTIVE-3: Desirae will contact therapist if emotional intensity is rated at a 3 or higher for skills coaching. \par Service Description/ Modality: Skills Coaching \par Frequency: As indicated based on emotional intensity/ difficulty problem-solving/ implementing skills \par Responsible provider: ERIC BETHEA \par -Clinician will offer/ encourage use of skills/ crisis coaching when level of emotional intensity exceeds a 3. \par -Clinician will provide skills/ crisis coaching with emphasis on identification/ reinforcement of distress tolerance skills in the moment to assist with goal of toleration/ management of self. \par \par GOAL # 1 OBJECTIVE-4: Desirae will continue to attend appointments with psychiatrist and comply with medication as prescribed to support implementation of PLEASE skill. \par Service Description: Medication management \par Frequency: Monthly or as indicated \par Provider: Dr. Dunn \par Interventions: \par - Patient will attend monthly (or as indicated) medication management appointments. \par - Patient will take medications as prescribed. \par \par How will the provider/individual/guardian know that level of care change is warranted? \par  Upon completion of at least 90 % attendance of weekly skills group and individual treatment, client will have: \par -Have attained and are utilizing a skill set to manage self-injurious/ target behaviors. \par -An average distress tolerance level on weekly diary card at 2 or below. \par -Improvement in interpersonal effectiveness skills as monitored by self-report and observed by DBT team. \par \par Discharge Plan- Indicate the anticipated plan for discharge, including Tx., support services, community resources. For [facility] programs, include a description of a substance abuse relapse prevention plan. \par \par Upon completion of agreement for DBT program which entails Skills Group and Individual Therapy, patient will be evaluated for necessity of services and/or referral to outside services. \par \par Individual has participated in the development of this plan [Yes] No, provide reason: \par Other (s) participated in the development of this plan Yes [No] If Yes, List Names  [de-identified] : Session environment conducive to validating, non-judgmental setting. Therapist provided cumulative DBT skills shaping/ reinforcing as recent events were reviewed and skills implemented where discussed and outcomes reviewed. Therapist acknowledged value in skills utilized throughout the week, reflecting on progress made in several areas. Therapist honed in on dialectics pertaining to upcoming transition of treatment, welcoming feelings and discussion pertaining to this while highlighting examples of growth and change to support this transition. \par \par Patient attended virtual session as scheduled. She showed improved completion of diary card this week, completing most days until the weekend took her "out of routine." Patient reflected on experiencing lower mood this week, acknowledging impact of feeling less accomplished (less mastery/ routine) than she had prior to leaving recent job. She shared examples demonstrating effective cumulative skill use, including use of opposite action to "get out of bed" most morning and start daily routine. She noted follow through on daily action steps supporting goal of obtaining new employment aligned with current goals/ values, including sending email about potential job opportunity. Patient shared that negative thoughts have decreased as compared to previous sessions, noting that she is not ruminating or "sitting in negative thoughts." Patient has been implementing non-judgmental stance skill to identify facts to challenge ineffective thoughts; noting this as helpful. In response to change (both pertaining to work/ routine and upcoming changes in treatment), she reported, "I can tolerate this discomfort," acknowledging her growth and progress. At time of session today, patient reported a decrease in passive suicidal ideation. She reported these thoughts as "fleeting", adding that they occur most days but she is better able to manage them using skills. She reported not "ruminating" or "sitting in" these thoughts as she did prior to DBT skills implementation supporting management of these thoughts. Patient denied the presence of active suicidal ideations, plan or intent. She denied any concerns pertaining to safety. She was able to validate mixed feelings pertaining to upcoming change in treatment (transition to new provider that had been previously discussed) but feeling capable to managing/ tolerating change. She reported compliance with medication as prescribed. \par  [FreeTextEntry1] : Patient agreed to identify/ challenge ineffective thoughts, utilizing non-judgmental stance/ checking the facts skills.\par She will continue to use PLEASE skill and identify/work towards daily mastery goals to support daily routine while continuing to take action steps towards goal of obtaining new employment. \par Patient will complete DBT diary card daily which will be reviewed in individual session.\par Patient will comply with medication as prescribed.\par *Patient is aware of therapist's upcoming time off and will contact  or [facility] clinic in this therapist's absence in the event of an emergency/ crisis.*\par Next session- 2/1 @ 12:00PM\par Plan to transition patient's care (DBT therapy/ medication management) to Dr. Lynn in month of February. \par \par

## 2023-01-18 NOTE — END OF VISIT
[Duration of Psychotherapy Visit (minutes spent in synchronous communication): ____] : Duration of Psychotherapy Visit (minutes spent in synchronous communication): [unfilled] [Individual Psychotherapy for 16-37 minutes] : Individual Psychotherapy for 16-37 minutes [Teletherapy Service Provided] : The services provided in this session were delivered via tele-therapy [Licensed Clinician] : Licensed Clinician

## 2023-01-18 NOTE — RISK ASSESSMENT
[Yes, patient reports ideation or behavior] : Yes, patient reports ideation or behavior [No, patient denies ideation or behavior] : No, patient denies ideation or behavior [Yes] : Yes [No] : No [Yes, more than three months ago] : Yes, more than three months ago [FreeTextEntry8] : At time of session today, patient reported a decrease in passive suicidal ideation. She reported these thoughts as "fleeting", adding that they occur most days, but she is better able to manage them using skills. She reported not "ruminating" or "sitting in" these thoughts as she did prior to DBT skills implementation. Patient denied the presence of active suicidal ideations, plan or intent. She denied any concerns pertaining to safety.

## 2023-01-18 NOTE — REASON FOR VISIT
[Self] : self [Patient preference] : as per patient preference [Continuity of care] : to ensure continuity of care [Starting, patient seen in-person within last 6 months] : Telehealth services are being started as patient has seen in-person within last 6 months. [Telehealth (audio & video) - Individual/Group] : This visit was provided via telehealth using real-time 2-way audio visual technology. [Medical Office: ([facility])___] : The provider was located at the medical office in [unfilled]. [Home] : The patient, [unfilled], was located at home, [unfilled], at the time of the visit. [Verbal consent obtained from patient/other participant(s)] : Verbal consent for telehealth/telephonic services obtained from patient/other participant(s) [Patient] : Patient [FreeTextEntry4] : 12:00PM [FreeTextEntry5] : 12:35PM [FreeTextEntry1] : Continuation of mental health treatment (as recent graduate of intensive DBT program).

## 2023-01-22 NOTE — PLAN
[Dialectical Behavior Therapy] : Dialectical Behavior Therapy  [Psychoeducation] : Psychoeducation  [Skills training (all types)] : Skills training (all types)  [Recommended Frequency of Visits: ____] : Recommended frequency of visits: [unfilled] [Return in ____ week(s)] : Return in [unfilled] week(s) [FreeTextEntry2] : GOAL # 1 OBJECTIVE- 1: Desirae will learn/ implement 3 skills to tolerate unwanted emotions/ urges to prevent emotional intensity from increasing/ mitigate crisis daily. \par Service Description/ Modality: Skills Group/ Individual therapy/ Skills Coaching \par Frequency: Weekly skills group sessions and weekly individual therapy sessions. \par Responsible provider: ERIC BETHEA \par Interventions: \par -Clinicians will provide a validating environment promoting a nonjudgmental atmosphere of self and an understanding of self along with understanding of dialectics in acceptance and change. \par -Clinicians will conduct weekly psycho-educational skills group on: distress tolerance module, review of mindfulness skills, introduction to emotional regulation module. \par -Clinicians will shape and reinforce skillful behaviors for client to generalize in their environment i.e. Homework review, role playing, etc. \par \par GOAL # 1 OBJECTIVE: 2: Desirae will implement mindfulness skills with goal of being in the present moment. She will utilize grounding techniques and other skills if aware of being in the future. \par Service Description/ Modality: Skills Group/ Individual therapy/ Skills Coaching \par Frequency: Weekly skills group sessions and weekly individual therapy sessions. \par Responsible provider: ERIC BETHEA \par Interventions: \par -Clinician will provide shaping, reinforcing for mindfulness skills in sessions. \par -Clinician will explore examples of mindfulness skills practice along with outcomes. \par -Clinician will conduct mindfulness exercises at the start of each skills group to promote and reinforce skills concepts/ effectiveness. \par \par GOAL # 1 OBJECTIVE-3: Desirae will contact therapist if emotional intensity is rated at a 3 or higher for skills coaching. \par Service Description/ Modality: Skills Coaching \par Frequency: As indicated based on emotional intensity/ difficulty problem-solving/ implementing skills \par Responsible provider: ERIC BETHEA \par -Clinician will offer/ encourage use of skills/ crisis coaching when level of emotional intensity exceeds a 3. \par -Clinician will provide skills/ crisis coaching with emphasis on identification/ reinforcement of distress tolerance skills in the moment to assist with goal of toleration/ management of self. \par \par GOAL # 1 OBJECTIVE-4: Desirae will continue to attend appointments with psychiatrist and comply with medication as prescribed to support implementation of PLEASE skill. \par Service Description: Medication management \par Frequency: Monthly or as indicated \par Provider: Dr. Dunn \par Interventions: \par - Patient will attend monthly (or as indicated) medication management appointments. \par - Patient will take medications as prescribed. \par \par How will the provider/individual/guardian know that level of care change is warranted? \par  Upon completion of at least 90 % attendance of weekly skills group and individual treatment, client will have: \par -Have attained and are utilizing a skill set to manage self-injurious/ target behaviors. \par -An average distress tolerance level on weekly diary card at 2 or below. \par -Improvement in interpersonal effectiveness skills as monitored by self-report and observed by DBT team. \par \par Discharge Plan- Indicate the anticipated plan for discharge, including Tx., support services, community resources. For [facility] programs, include a description of a substance abuse relapse prevention plan. \par \par Upon completion of agreement for DBT program which entails Skills Group and Individual Therapy, patient will be evaluated for necessity of services and/or referral to outside services. \par \par Individual has participated in the development of this plan [Yes] No, provide reason: \par Other (s) participated in the development of this plan Yes [No] If Yes, List Names  [de-identified] : Session environment conducive to validating, non-judgmental setting. Therapist worked to reinforce effectiveness of DBT skills used recently. Therapist highlighted patient's strengths and capabilities.Therapist worked to differentiate between quitting job due to being unable to handle distress vs. making decision to leave based on facts. Therapist assisted patient in challenging judgmental thoughts, labeling self as a "failure."\par \par Patient attended virtual session as scheduled. She shared update pertaining to concerns identified during last session, sharing decision made to quit job. She noted examples of interpersonal effectiveness skills, utilizing skills to maintain her self-respect in response to previous employer. Patient reported ineffective urges to curse and yell, which she did not act on- feeling good about this. Patient acknowledged the difference between being able to tolerate job vs. deciding not to (after multiple efforts of problem-solving). She still struggled with judgmental thoughts, labeling uncertainty about decision being "wrong" and herself as being a "failure." She appeared receptive to interventions utilized in session. She reported passive suicidal thoughts as manageable using her skills and denied the presence of active suicidal ideations, plan intent or any safety concerns. She reported continued effective skill use in response to urges to self- harm and other ineffective urges this week. Patient was more willing to utilize skills throughout the week as evidenced by her diary card and continued efforts to tolerate stressors and problem-solve effectively in other ways. She was agreeable to calling therapist in between sessions in the event of increased crisis, difficulty managing self or safety concerns. She reported compliance with medication as prescribed.  [FreeTextEntry1] : Patient will utilize comparison skill to see progress.\par She will work to use non-judgmental stance in response to ineffective thoughts.\par Patient will keep daily routine, identifying examples of mastery daily.\par She agreed to contact therapist in the event of safety concerns.\par Patient will continue to complete DBT diary card daily which will be reviewed in individual session.\par Patient will outreach therapist for crisis skills coaching, if needed. \par Patient will comply with medication as prescribed.

## 2023-01-22 NOTE — REASON FOR VISIT
[Home] : at home, [unfilled] , at the time of the visit. [Medical Office: ([facility])___] : at the medical office located in  [Self] : self [Patient] : Patient [FreeTextEntry1] : Continuation of mental health treatment (as recent graduate of DBT program).

## 2023-01-22 NOTE — PHYSICAL EXAM
[Cooperative] : cooperative [Depressed] : depressed [Anxious] : anxious [Clear] : clear [Linear/Goal Directed] : linear/goal directed [Depressive] : depressive [None Reported] : none reported [Average] : average [WNL] : within normal limits [de-identified] : Tearful

## 2023-01-22 NOTE — RISK ASSESSMENT
[Yes, patient reports ideation or behavior] : Yes, patient reports ideation or behavior [Yes] : Yes [No] : No [Yes, more than three months ago] : Yes, more than three months ago [FreeTextEntry8] : Patient reported a decrease in frequency and intensity of passive suicidal ideations as compared to last session. She reported these thoughts as manageable using her skills and denied the presence of active suicidal ideations, plan intent or any safety concerns at the time of session. [TextBox_32] : Patient reported a decrease in frequency and intensity of passive suicidal ideations as compared to last session. She reported these thoughts as manageable using her skills and denied the presence of active suicidal ideations, plan intent or any safety concerns at the time of session.

## 2023-01-24 ENCOUNTER — APPOINTMENT (OUTPATIENT)
Dept: PSYCHIATRY | Facility: CLINIC | Age: 28
End: 2023-01-24
Payer: MEDICAID

## 2023-01-24 ENCOUNTER — OUTPATIENT (OUTPATIENT)
Dept: OUTPATIENT SERVICES | Facility: HOSPITAL | Age: 28
LOS: 1 days | Discharge: HOME | End: 2023-01-24

## 2023-01-24 DIAGNOSIS — F33.2 MAJOR DEPRESSIVE DISORDER, RECURRENT SEVERE WITHOUT PSYCHOTIC FEATURES: ICD-10-CM

## 2023-01-24 DIAGNOSIS — F60.3 BORDERLINE PERSONALITY DISORDER: ICD-10-CM

## 2023-01-24 PROCEDURE — 99214 OFFICE O/P EST MOD 30 MIN: CPT | Mod: 95

## 2023-01-24 NOTE — HISTORY OF PRESENT ILLNESS
[FreeTextEntry1] : Desirae was seen for psychiatric follow-up.  Upon interview she reported she has been feeling "okay."  She denied feeling depressed or hopeless.  She reported feeling anxious usually around social interactions such as going to dinner or to a birthday party.  She reported feeling most anxious in the time leading up to the event but then usually feeling better during the event.  She reported using grounding techniques such as breathing to manage her anxiety.  She reported also using Vistaril as needed when the symptoms are extreme.  She denied feeling as though the anxiety was interfering with her life or causing her to not be able to do what she wanted.  Regarding her mood she reported feeling better when she spends time with her nephew who makes her happy.  She reported enjoying social connections she has made with friends, and spending time with her boyfriend.  She denied any safety concerns at this time.  She denied any thoughts of wanting to harm herself or anyone else. [FreeTextEntry2] : past psychiatric history of Major Depressive Disorder, Borderline Personality Disorder, Anxiety, multiple prior inpatient psychiatric admissions, prior suicide attempts by OD, most recently in outpatient treatment privately with Dr. Cross and Ms. Avril Stokes in the community, who was admitted to ICU and intubated after OD on Bupropion, Effexor, and Trileptal in a suicide attempt, admitted to [facility] IPP from 11/4/21 to 11/10/21, subsequently attended partial hospitalization program (PHP) here at [facility] for aftercare in 12/2021. \par Desirae has since completed DBT and has remained stable since with follow up appointments for medication management. \par  [FreeTextEntry3] : Effexor\par Wellbutrin\par Trileptal\par Lexapro\par

## 2023-01-24 NOTE — DISCUSSION/SUMMARY
[FreeTextEntry1] : Desirae Edmonds is a 26 year old single  Female, domiciled with family, currently unemployed, with past psychiatric history of Major Depressive Disorder, Borderline Personality Disorder, Anxiety, multiple prior inpatient psychiatric admissions, prior suicide attempts by OD, most recently in outpatient treatment privately with Dr. Cross and Ms. Avril Stokes in the community, who was admitted to ICU and intubated after OD on Bupropion, Effexor, and Trileptal in a suicide attempt, admitted to [facility] IPP from 11/4/21 to 11/10/21, subsequently attended partial hospitalization program (PHP) here at [facility] for aftercare in 12/2021, who presents today for routine med mgmt follow up. \par \par Upon exam, Desirae reported her mood is "OK." She denied safety concerns. She reported symptoms of anxiety but reported managing with therapy and grounding techniques which allow her to continue functioning without impairment.

## 2023-01-24 NOTE — PLAN
[Yes. details: ___] : Yes, [unfilled] [Medication education provided] : Medication education provided. [FreeTextEntry4] : GOAL # 1 OBJECTIVE-4: Desirae will continue to attend appointments with psychiatrist and comply with medication as prescribed to support implementation of PLEASE skill. \par Service Description: Medication management \par Frequency: Monthly or as indicated \par Provider: Dr. Dunn \par  [FreeTextEntry5] : \par #). Desirae agrees to follow up virtually in 4 weeks.\par - Continue Wellbutrin 300mg daily for symptoms of depression\par - Continue Vistaril 25mg Qhs PRN insomnia / anxiety symptoms.\par \par \par \par

## 2023-01-24 NOTE — REASON FOR VISIT
[Patient preference] : as per patient preference [Telehealth (audio & video) - Individual/Group] : This visit was provided via telehealth using real-time 2-way audio visual technology. [Medical Office: ([facility])___] : The provider was located at the medical office in [unfilled]. [Home] : The patient, [unfilled], was located at home, [unfilled], at the time of the visit. [FreeTextEntry4] : 2pm [FreeTextEntry5] : 2:25pm [Patient] : Patient [FreeTextEntry1] : "I'm feeling OK"

## 2023-01-24 NOTE — RISK ASSESSMENT
[No, patient denies ideation or behavior] : No, patient denies ideation or behavior [Low acute suicide risk] : Low acute suicide risk [Yes] : Yes [Not clinically indicated] : Safety Plan completed/updated (for individuals at risk): Not clinically indicated

## 2023-02-01 ENCOUNTER — OUTPATIENT (OUTPATIENT)
Dept: OUTPATIENT SERVICES | Facility: HOSPITAL | Age: 28
LOS: 1 days | Discharge: HOME | End: 2023-02-01

## 2023-02-01 ENCOUNTER — APPOINTMENT (OUTPATIENT)
Dept: PSYCHIATRY | Facility: CLINIC | Age: 28
End: 2023-02-01

## 2023-02-01 DIAGNOSIS — F33.2 MAJOR DEPRESSIVE DISORDER, RECURRENT SEVERE WITHOUT PSYCHOTIC FEATURES: ICD-10-CM

## 2023-02-01 DIAGNOSIS — F60.3 BORDERLINE PERSONALITY DISORDER: ICD-10-CM

## 2023-02-02 NOTE — RISK ASSESSMENT
[Yes, patient reports ideation or behavior] : Yes, patient reports ideation or behavior [Yes, more than three months ago] : Yes, more than three months ago [Low acute suicide risk] : Low acute suicide risk [No] : No [Yes] : Safety Plan completed/updated (for individuals at risk): Yes

## 2023-02-03 NOTE — REASON FOR VISIT
[Patient preference] : as per patient preference [Telehealth (audio & video) - Individual/Group] : This visit was provided via telehealth using real-time 2-way audio visual technology. [Medical Office: ([facility])___] : The provider was located at the medical office in [unfilled]. [Home] : The patient, [unfilled], was located at home, [unfilled], at the time of the visit. [Verbal consent obtained from patient/other participant(s)] : Verbal consent for telehealth/telephonic services obtained from patient/other participant(s) [Patient] : Patient [FreeTextEntry4] : 12:00PM [FreeTextEntry5] : 12:45PM [FreeTextEntry1] : Continuation of mental health treatment (as recent graduate from DBT program).

## 2023-02-03 NOTE — PLAN
[Dialectical Behavior Therapy] : Dialectical Behavior Therapy  [Psychoeducation] : Psychoeducation  [Skills training (all types)] : Skills training (all types)  [Recommended Frequency of Visits: ____] : Recommended frequency of visits: [unfilled] [Return in ____ week(s)] : Return in [unfilled] week(s) [FreeTextEntry2] : GOAL # 1 OBJECTIVE- 1: Desirae will learn/ implement 3 skills to tolerate unwanted emotions/ urges to prevent emotional intensity from increasing/ mitigate crisis daily. \par Service Description/ Modality: Skills Group/ Individual therapy/ Skills Coaching \par Frequency: Weekly skills group sessions and weekly individual therapy sessions. \par Responsible provider: ERIC BETHEA \par Interventions: \par -Clinicians will provide a validating environment promoting a nonjudgmental atmosphere of self and an understanding of self along with understanding of dialectics in acceptance and change. \par -Clinicians will conduct weekly psycho-educational skills group on: distress tolerance module, review of mindfulness skills, introduction to emotional regulation module. \par -Clinicians will shape and reinforce skillful behaviors for client to generalize in their environment i.e. Homework review, role playing, etc. \par \par GOAL # 1 OBJECTIVE: 2: Desirae will implement mindfulness skills with goal of being in the present moment. She will utilize grounding techniques and other skills if aware of being in the future. \par Service Description/ Modality: Skills Group/ Individual therapy/ Skills Coaching \par Frequency: Weekly skills group sessions and weekly individual therapy sessions. \par Responsible provider: ERIC BETHEA \par Interventions: \par -Clinician will provide shaping, reinforcing for mindfulness skills in sessions. \par -Clinician will explore examples of mindfulness skills practice along with outcomes. \par -Clinician will conduct mindfulness exercises at the start of each skills group to promote and reinforce skills concepts/ effectiveness. \par \par GOAL # 1 OBJECTIVE-3: Desirae will contact therapist if emotional intensity is rated at a 3 or higher for skills coaching. \par Service Description/ Modality: Skills Coaching \par Frequency: As indicated based on emotional intensity/ difficulty problem-solving/ implementing skills \par Responsible provider: ERIC BETHEA \par -Clinician will offer/ encourage use of skills/ crisis coaching when level of emotional intensity exceeds a 3. \par -Clinician will provide skills/ crisis coaching with emphasis on identification/ reinforcement of distress tolerance skills in the moment to assist with goal of toleration/ management of self. \par \par GOAL # 1 OBJECTIVE-4: Desirae will continue to attend appointments with psychiatrist and comply with medication as prescribed to support implementation of PLEASE skill. \par Service Description: Medication management \par Frequency: Monthly or as indicated \par Provider: Dr. Dunn \par Interventions: \par - Patient will attend monthly (or as indicated) medication management appointments. \par - Patient will take medications as prescribed. \par \par How will the provider/individual/guardian know that level of care change is warranted? \par  Upon completion of at least 90 % attendance of weekly skills group and individual treatment, client will have: \par -Have attained and are utilizing a skill set to manage self-injurious/ target behaviors. \par -An average distress tolerance level on weekly diary card at 2 or below. \par -Improvement in interpersonal effectiveness skills as monitored by self-report and observed by DBT team. \par \par Discharge Plan- Indicate the anticipated plan for discharge, including Tx., support services, community resources. For [facility] programs, include a description of a substance abuse relapse prevention plan. \par \par Upon completion of agreement for DBT program which entails Skills Group and Individual Therapy, patient will be evaluated for necessity of services and/or referral to outside services. \par \par Individual has participated in the development of this plan [Yes] No, provide reason: \par Other (s) participated in the development of this plan Yes [No] If Yes, List Names  [de-identified] : Session environment conducive to validating, non-judgmental setting. Therapist worked to reinforce effectiveness of DBT skills used since last session. Therapist provided cumulative skills shaping/ reinforcing throughout session. Therapist and patient reviewed areas of progress related to treatment goals.\par Therapist and patient continued discussions and exploration of feelings in preparation for upcoming termination/ transition of treatment. \par \par Patient attended virtual session as scheduled. She completed diary card, describing this past weekend as being "challenging" but reporting, "I got through it well (using skills)." Patient shared willingness to use skills to support the goal of toleration as opposed to reacting right away in an emotional state. She reflected on accessing her 'wise mind' skill followed by interpersonal effectiveness skills to address this when she was calmer, seeing this as effective. Patient shared about use of skills to support goal of initiating plans for the first time with friends, noting importance of balance in maintaining important relationships and was able to identify/ challenge cognitive distortions that were a barrier to this goal. Patient was an active participant in session today, demonstrating cumulative DBT skill use/ effectiveness. She was actively involved as cope ahead plan was formulated in response to anticipated stressor. She continued to share multiple examples of action steps taken to support goal of employment, submitting resume to several jobs and emailing to network. In continued discussion pertaining to termination (in preparation for upcoming transition to new provider at clinic), patient acknowledged several areas of progress. She noted feeling proud of self for "not spiraling" in response to recent stressors, as she has previously. She went on to state, "I have a future now." Patient shared that while bittersweet, she looks at upcoming transition of treatment as another accomplishment/ example of mastery. She added, "I feel like I am living as opposed to just existing." She specifically noted improvement in managing ineffective/ negative thoughts, able to look at things from "dialectical perspective" as opposed to "black or white." Patient also noted improvement in relationship with friends and family, specifically noting that her mother has been commenting on her progress and shared with patient that she "no longer is walking on egg shells" around patient. She continues to report passive suicidal thoughts as manageable using her skills and denied the presence of active suicidal ideations, plan intent or any safety concerns. She reported continued effective skill use in response to ineffective urges this week. She reported compliance with medication as prescribed.  [FreeTextEntry1] : Patient agreed to continue to identify/ challenge ineffective thoughts, utilizing non-judgmental stance/ checking the facts skills.\par She will continue to use PLEASE skill and identify/work towards daily mastery goals to support daily routine while continuing to take action steps towards goal of obtaining new employment.\par She will utilize COPE AHEAD skills plan formulated today should she need to in between session. \par Patient will complete DBT diary card daily which will be reviewed in individual session.\par Patient will comply with medication as prescribed.\par Patient is aware of/ in agreement to transition plan (DBT therapy/ medication management) to Dr. Lynn in month of February. \par Therapist will outreach Dr. Lynn to invite to join next (and final session) for warm hand-off to support continuity of care. \par

## 2023-02-06 ENCOUNTER — TRANSCRIPTION ENCOUNTER (OUTPATIENT)
Age: 28
End: 2023-02-06

## 2023-02-15 ENCOUNTER — APPOINTMENT (OUTPATIENT)
Dept: PSYCHIATRY | Facility: CLINIC | Age: 28
End: 2023-02-15

## 2023-02-15 ENCOUNTER — OUTPATIENT (OUTPATIENT)
Dept: OUTPATIENT SERVICES | Facility: HOSPITAL | Age: 28
LOS: 1 days | End: 2023-02-15
Payer: MEDICAID

## 2023-02-15 DIAGNOSIS — F33.2 MAJOR DEPRESSIVE DISORDER, RECURRENT SEVERE WITHOUT PSYCHOTIC FEATURES: ICD-10-CM

## 2023-02-15 DIAGNOSIS — F60.3 BORDERLINE PERSONALITY DISORDER: ICD-10-CM

## 2023-02-15 PROCEDURE — 90834 PSYTX W PT 45 MINUTES: CPT | Mod: 95

## 2023-02-17 NOTE — ADDENDUM
[FreeTextEntry1] : Patient has final session with this therapist today with the goal of reviewing progress and plans for next phase of treatment. She was able to reflect on several areas of progress during continued work with this therapist to support DBT framework/ interventions in first stage of aftercare:\par \par Desirae acknowledged having difficulty regulating emotions/ behaviors at the start of treatment (being referred to OPD/ DBT program from [facility] following a serious suicide attempt). She reported that prior to DBT the longest time she had gone without self-harming was 6 months. She has not acted on self-harming urges for the past 11 months despite having urges in moments of emotional intensity, using DBT skills to support behavioral control. Desirae  also acknowledged being paralyzed with the thought of working prior to starting DBT program, seeing herself as incapable of working due to her inability to manage stress (having made the impulsive decision to quit job previous job due to difficulty tolerating distressing emotions/ situations). Desirae shared with therapist and Dr. Lynn that she obtained a job while in DBT program (feeling able and competent enough to even identify this as a goal is progress) and she worked for several months before making the decision based on facts (not impulsivity) to leave job due to specific goals not being met despite her efforts to address/ resolve this. She shared that she is currently in the on-boarding process for a new job. Desirae  acknowledged skill resulted in "emotional shift", seeing things dialectically as opposed to in 'black-and-white.' She also shared improved ability to cope with/ manage suicidal ideations as progress, describing suicidal ideations as fleeting and decreased in frequency/ intensity. She reported feeling "in control" of these thoughts when they happen, noting "it never feels like I can't handle them." Desirae  also acknowledged progress made in improved awareness over ineffective thoughts, being able to 'check the facts' and respond more effectively with skills. Patient ended session by expressing gratitude to therapist. She stated, "I do have a future. I have eunice and trust myself and my skills." Desirae continues to demonstrate understanding and goals of cumulative DBT skills, making use of mindfulness, interpersonal effectiveness, emotional regulation, and distress tolerance to support goal of effective problem-solving and control over thoughts, emotions, urges/ behavior. She has attained treatment goals based on the evidence shared above. \par \par Desirae has final appointment with Dr. Dunn scheduled for next week.\par She will transition to next phase of treatment with Dr. Lynn scheduled on 3/8/23 at 4:30PM.\par Dr. Lynn will explore patient goal's for aftercare and devise new treatment plan based on this.

## 2023-02-17 NOTE — ADDENDUM
[FreeTextEntry1] : Patient has final session with this therapist today with the goal of reviewing progress and plans for next phase of treatment. She was able to reflect on several areas of progress during continued work with this therapist to support DBT framework/ interventions in first stage of aftercare:\par \par Desirae acknowledged having difficulty regulating emotions/ behaviors at the start of treatment (being referred to OPD/ DBT program from [facility] following a serious suicide attempt). She reported that prior to DBT the longest time she had gone without self-harming was 6 months. She has not acted on self-harming urges for the past 11 months despite having urges in moments of emotional intensity, using DBT skills to support behavioral control. Desirae  also acknowledged being paralyzed with the thought of working prior to starting DBT program, seeing herself as incapable of working due to her inability to manage stress (having made the impulsive decision to quit job previous job due to difficulty tolerating distressing emotions/ situations). Desirae shared with therapist and Dr. Lynn that she obtained a job while in DBT program (feeling able and competent enough to even identify this as a goal is progress) and she worked for several months before making the decision based on facts (not impulsivity) to leave job due to specific goals not being met despite her efforts to address/ resolve this. She shared that she is currently in the on-boarding process for a new job. Desirae  acknowledged skill resulted in "emotional shift", seeing things dialectically as opposed to in 'black-and-white.' She also shared improved ability to cope with/ manage suicidal ideations as progress, describing suicidal ideations as fleeting and decreased in frequency/ intensity. She reported feeling "in control" of these thoughts when they happen, noting "it never feels like I can't handle them." Desirae  also acknowledged progress made in improved awareness over ineffective thoughts, being able to 'check the facts' and respond more effectively with skills. Patient ended session by expressing gratitude to therapist. She stated, "I do have a future. I have eunice and trust myself and my skills." Desirae continues to demonstrate understanding and goals of cumulative DBT skills, making use of mindfulness, interpersonal effectiveness, emotional regulation, and distress tolerance to support goal of effective problem-solving and control over thoughts, emotions, urges/ behavior. She has attained treatment goals based on the evidence shared above. \par \par Desirae has final appointment with Dr. Dunn scheduled for next week.\par She will transition to next phase of treatment with Dr. Lynn scheduled on 3/8/23 at 4:30PM.\par Dr. Lynn will explore patient goal's for aftercare and devise new treatment plan based on this.

## 2023-02-17 NOTE — DISCUSSION/SUMMARY
[Plan Review] : Plan Review [Able to manage surrounding demands and opportunities] : able to manage surrounding demands and opportunities [Adherent to treatment recommendations] : adherent to treatment recommendations [Insightful] : insightful [Motivated to participate in treatment] : motivated to participate in treatment [In good health] : in good health [Has vocational interests or hobbies] : has vocational interests or hobbies [Part of a supportive family] : part of a supportive family [Housing stability] : housing stability [Civic organizations] : civic organizations [English fluency] : English fluency [Connected to healthcare] : connected to healthcare [Access to safe outdoor spaces] : access to safe outdoor spaces [Social supports] : social supports [every ___ months] : every [unfilled] months [Mental Health] : Mental Health [___ times a week] : [unfilled] times a week [None - Reason others did not participate:] : None - Reason others did not participate:  [Yes] : Yes [Therapist] : Therapist [every ___ weeks] : every [unfilled] weeks [Attained - As evidenced by] : Attained - As evidenced by: [Psychiatric Provider/Prescriber] : Psychiatric Provider/Prescriber [FreeTextEntry3] : 12/21/21 [FreeTextEntry8] : None identified [FreeTextEntry9] : None identified [de-identified] : Desirae will implement mindfulness skills with goal of being in the present moment. She will utilize grounding techniques and other skills if aware of being in the future.  [de-identified] : Resolved [de-identified] : Desirae has utilized mindfulness skills to support control over thoughts; demonstrating ability to utilize non-judgmental stance and to differentiate between fact and assumption to support goal of emotional regulation and being in the present moment.   [FreeTextEntry1] : Crisis management [FreeTextEntry4] : "I want to prevent ineffective urges in response to emotional intensity/ crisis." [de-identified] : Patient has been without self-harming behaviors, using distress tolerance skills to manage ineffective urges. [de-identified] : \par Desirae will contact therapist if emotional intensity is rated at a 3 or higher for skills coaching. \par Desirae will contact therapist if emotional intensity is rated at a 3 or higher for skills coaching. \par Desirae will contact therapist if emotional intensity is rated at a 3 or higher for skills coaching. \par Desirae will contact therapist if emotional intensity is rated at a 3 or higher for skills coaching. \par Desirae will contact therapist if emotional intensity is rated at a 3 or higher for skills coaching. \par Desirae will contact therapist if emotional intensity is rated at a 3 or higher for skills coaching. \par Desirae will contact therapist it emotional intensity is at a 4 or higher skills coaching.  [de-identified] : Resolved [de-identified] : Patient has utilized skills coaching when needed but her need has decreased since the last treatment plan cycle.  [FreeTextEntry5] : -Clinician will offer/ encourage use of skills/ crisis coaching.\par -Clinician will provide skills/ crisis coaching with emphasis on distress tolerance skills to assist with goal of toleration/ management of self.  [de-identified] : Coaching as needed [de-identified] : Upon completion of at least 90 % attendance of weekly skills group and individual treatment, client will have: \par -Have attained and are utilizing a skill set to manage self-injurious/ target behaviors. \par -An average distress tolerance level on weekly diary card at 2 or below. \par -Improvement in interpersonal effectiveness skills as monitored by self-report and observed by DBT team. How will the provider/individual/guardian know that level of care change is warranted? \par  Upon completion of at least 90 % attendance of weekly skills group and individual treatment, client will have: \par -Have attained and are utilizing a skill set to manage self-injurious/ target behaviors. \par -An average distress tolerance level on weekly diary card at 2 or below. \par -Improvement in interpersonal effectiveness skills as monitored by self-report and observed by DBT team. \par  [de-identified] : \par \par How will the provider/individual/guardian know that level of care change is warranted? \par  Upon completion of at least 90 % attendance of weekly skills group and individual treatment, client will have: \par -Have attained and are utilizing a skill set to manage self-injurious/ target behaviors. \par -An average distress tolerance level on weekly diary card at 2 or below. \par -Improvement in interpersonal effectiveness skills as monitored by self-report and observed by DBT team.  How will the provider/individual/guardian know that level of care change is warranted? \par  Upon completion of at least 90 % attendance of weekly skills group and individual treatment, client will have: \par -Have attained and are utilizing a skill set to manage self-injurious/ target behaviors. \par -An average distress tolerance level on weekly diary card at 2 or below. \par -Improvement in interpersonal effectiveness skills as monitored by self-report and observed by DBT team. \par \par  [de-identified] : Not indicated

## 2023-02-17 NOTE — REASON FOR VISIT
[Self] : self [Patient preference] : as per patient preference [Telehealth (audio & video) - Individual/Group] : This visit was provided via telehealth using real-time 2-way audio visual technology. [Medical Office: ([facility])___] : The provider was located at the medical office in [unfilled]. [Home] : The patient, [unfilled], was located at home, [unfilled], at the time of the visit. [Verbal consent obtained from patient/other participant(s)] : Verbal consent for telehealth/telephonic services obtained from patient/other participant(s) [Patient] : Patient [FreeTextEntry4] : 2:00PM [FreeTextEntry5] : 2:45PM [FreeTextEntry1] : Final session with provider as recent graduate from DBT program as she prepares to transition care to Dr. Lynn for next phase of treatment.

## 2023-02-17 NOTE — RISK ASSESSMENT
[Yes, patient reports ideation or behavior] : Yes, patient reports ideation or behavior [No, patient denies ideation or behavior] : No, patient denies ideation or behavior [Yes, more than three months ago] : Yes, more than three months ago [No] : No [Yes] : Safety Plan completed/updated (for individuals at risk): Yes [FreeTextEntry8] :  At time of session today, patient reported a decrease in passive suicidal ideation. She reported these thoughts as "fleeting", adding that they occur most days, but she is better able to manage them using skills. She reported not "ruminating" or "sitting in" these thoughts as she did prior to DBT skills implementation. Patient denied the presence of active suicidal ideations, plan or intent. She denied any concerns pertaining to safety.

## 2023-02-17 NOTE — DISCUSSION/SUMMARY
[Plan Review] : Plan Review [Able to manage surrounding demands and opportunities] : able to manage surrounding demands and opportunities [Adherent to treatment recommendations] : adherent to treatment recommendations [Insightful] : insightful [Motivated to participate in treatment] : motivated to participate in treatment [In good health] : in good health [Has vocational interests or hobbies] : has vocational interests or hobbies [Part of a supportive family] : part of a supportive family [Housing stability] : housing stability [Civic organizations] : civic organizations [English fluency] : English fluency [Connected to healthcare] : connected to healthcare [Access to safe outdoor spaces] : access to safe outdoor spaces [Social supports] : social supports [every ___ months] : every [unfilled] months [Mental Health] : Mental Health [___ times a week] : [unfilled] times a week [None - Reason others did not participate:] : None - Reason others did not participate:  [Yes] : Yes [Therapist] : Therapist [every ___ weeks] : every [unfilled] weeks [Attained - As evidenced by] : Attained - As evidenced by: [Psychiatric Provider/Prescriber] : Psychiatric Provider/Prescriber [FreeTextEntry3] : 12/21/21 [FreeTextEntry8] : None identified [FreeTextEntry9] : None identified [de-identified] : Desirae will implement mindfulness skills with goal of being in the present moment. She will utilize grounding techniques and other skills if aware of being in the future.  [de-identified] : Resolved [de-identified] : Desirae has utilized mindfulness skills to support control over thoughts; demonstrating ability to utilize non-judgmental stance and to differentiate between fact and assumption to support goal of emotional regulation and being in the present moment.   [FreeTextEntry1] : Crisis management [FreeTextEntry4] : "I want to prevent ineffective urges in response to emotional intensity/ crisis." [de-identified] : Patient has been without self-harming behaviors, using distress tolerance skills to manage ineffective urges. [de-identified] : \par Desirae will contact therapist if emotional intensity is rated at a 3 or higher for skills coaching. \par Desirae will contact therapist if emotional intensity is rated at a 3 or higher for skills coaching. \par Desirae will contact therapist if emotional intensity is rated at a 3 or higher for skills coaching. \par Desirae will contact therapist if emotional intensity is rated at a 3 or higher for skills coaching. \par Desirae will contact therapist if emotional intensity is rated at a 3 or higher for skills coaching. \par Desirae will contact therapist if emotional intensity is rated at a 3 or higher for skills coaching. \par Desirae will contact therapist it emotional intensity is at a 4 or higher skills coaching.  [de-identified] : Resolved [de-identified] : Patient has utilized skills coaching when needed but her need has decreased since the last treatment plan cycle.  [FreeTextEntry5] : -Clinician will offer/ encourage use of skills/ crisis coaching.\par -Clinician will provide skills/ crisis coaching with emphasis on distress tolerance skills to assist with goal of toleration/ management of self.  [de-identified] : Coaching as needed [de-identified] : Upon completion of at least 90 % attendance of weekly skills group and individual treatment, client will have: \par -Have attained and are utilizing a skill set to manage self-injurious/ target behaviors. \par -An average distress tolerance level on weekly diary card at 2 or below. \par -Improvement in interpersonal effectiveness skills as monitored by self-report and observed by DBT team. How will the provider/individual/guardian know that level of care change is warranted? \par  Upon completion of at least 90 % attendance of weekly skills group and individual treatment, client will have: \par -Have attained and are utilizing a skill set to manage self-injurious/ target behaviors. \par -An average distress tolerance level on weekly diary card at 2 or below. \par -Improvement in interpersonal effectiveness skills as monitored by self-report and observed by DBT team. \par  [de-identified] : \par \par How will the provider/individual/guardian know that level of care change is warranted? \par  Upon completion of at least 90 % attendance of weekly skills group and individual treatment, client will have: \par -Have attained and are utilizing a skill set to manage self-injurious/ target behaviors. \par -An average distress tolerance level on weekly diary card at 2 or below. \par -Improvement in interpersonal effectiveness skills as monitored by self-report and observed by DBT team.  How will the provider/individual/guardian know that level of care change is warranted? \par  Upon completion of at least 90 % attendance of weekly skills group and individual treatment, client will have: \par -Have attained and are utilizing a skill set to manage self-injurious/ target behaviors. \par -An average distress tolerance level on weekly diary card at 2 or below. \par -Improvement in interpersonal effectiveness skills as monitored by self-report and observed by DBT team. \par \par  [de-identified] : Not indicated

## 2023-02-17 NOTE — PLAN
[Dialectical Behavior Therapy] : Dialectical Behavior Therapy  [Psychoeducation] : Psychoeducation  [Skills training (all types)] : Skills training (all types)  [FreeTextEntry2] : *Treatment goals met by patient today demonstrated by progress made in treatment.*\par Treatment plan revision completed to highlight this.\par \par GOAL # 1 OBJECTIVE- 1: Desirae will learn/ implement 3 skills to tolerate unwanted emotions/ urges to prevent emotional intensity from increasing/ mitigate crisis daily. \par Service Description/ Modality: Skills Group/ Individual therapy/ Skills Coaching \par Frequency: Weekly skills group sessions and weekly individual therapy sessions. \par Responsible provider: ERIC BETHEA \par Interventions: \par -Clinicians will provide a validating environment promoting a nonjudgmental atmosphere of self and an understanding of self along with understanding of dialectics in acceptance and change. \par -Clinicians will conduct weekly psycho-educational skills group on: distress tolerance module, review of mindfulness skills, introduction to emotional regulation module. \par -Clinicians will shape and reinforce skillful behaviors for client to generalize in their environment i.e. Homework review, role playing, etc. \par \par GOAL # 1 OBJECTIVE: 2: Desirae will implement mindfulness skills with goal of being in the present moment. She will utilize grounding techniques and other skills if aware of being in the future. \par Service Description/ Modality: Skills Group/ Individual therapy/ Skills Coaching \par Frequency: Weekly skills group sessions and weekly individual therapy sessions. \par Responsible provider: ERIC BETHEA \par Interventions: \par -Clinician will provide shaping, reinforcing for mindfulness skills in sessions. \par -Clinician will explore examples of mindfulness skills practice along with outcomes. \par -Clinician will conduct mindfulness exercises at the start of each skills group to promote and reinforce skills concepts/ effectiveness. \par \par GOAL # 1 OBJECTIVE-3: Desirae will contact therapist if emotional intensity is rated at a 3 or higher for skills coaching. \par Service Description/ Modality: Skills Coaching \par Frequency: As indicated based on emotional intensity/ difficulty problem-solving/ implementing skills \par Responsible provider: ERIC BETHEA \par -Clinician will offer/ encourage use of skills/ crisis coaching when level of emotional intensity exceeds a 3. \par -Clinician will provide skills/ crisis coaching with emphasis on identification/ reinforcement of distress tolerance skills in the moment to assist with goal of toleration/ management of self. \par \par GOAL # 1 OBJECTIVE-4: Desirae will continue to attend appointments with psychiatrist and comply with medication as prescribed to support implementation of PLEASE skill. \par Service Description: Medication management \par Frequency: Monthly or as indicated \par Provider: Dr. Dunn \par Interventions: \par - Patient will attend monthly (or as indicated) medication management appointments. \par - Patient will take medications as prescribed. \par \par How will the provider/individual/guardian know that level of care change is warranted? \par  Upon completion of at least 90 % attendance of weekly skills group and individual treatment, client will have: \par -Have attained and are utilizing a skill set to manage self-injurious/ target behaviors. \par -An average distress tolerance level on weekly diary card at 2 or below. \par -Improvement in interpersonal effectiveness skills as monitored by self-report and observed by DBT team. \par \par Discharge Plan- Indicate the anticipated plan for discharge, including Tx., support services, community resources. For [facility] programs, include a description of a substance abuse relapse prevention plan. \par \par Upon completion of agreement for DBT program which entails Skills Group and Individual Therapy, patient will be evaluated for necessity of services and/or referral to outside services. \par \par Individual has participated in the development of this plan [Yes] No, provide reason: \par Other (s) participated in the development of this plan Yes [No] If Yes, List Names  [de-identified] : Session environment conducive to validating, non-judgmental setting. Therapist introduced patient to newly assigned provider, Dr. Lynn, who joined for the early part of session today. Therapist used open-ended questions to allow patient to reflect on journey in DBT program and examples of progress made in final session with this therapist (and for Dr. Lynn to gain insights into patient treatment history and progress made). Therapist continued to provide cumulative skills reinforcing throughout session with emphasis on patient's strengths and capabilities were highlighted. Therapist and patient had final termination session, reviewing next phase of care plan inclusive of upcoming appointments.  \par \par Patient attended final session with this provider virtually today as scheduled. She appeared engaged as she was introduced to Dr. Lynn who joined the first part of session. Patient shared about her journey in treatment, openly offering examples demonstrating progress in intensive DBT treatment supported by skills learned/ implemented. She acknowledged having difficulty regulating emotions/ behaviors at the start of treatment (being referred to OPD/ DBT program from [facility] following near-fatal suicide attempt). She reported that prior to DBT the longest time she had gone without self-harming was 6 months. She has not acted on self-harming urges for the past 11 months despite having urges in moments of emotional intensity, using skill to support behavioral control. Patient also acknowledged being paralyzed with the thought of working prior to starting DBT program, seeing herself as incapable of working due to her inability to manage stress (having made impulsive decision to quit job previous job due to difficulty tolerating distressing emotions/ situations). Patient shared with therapist and Dr. Lynn that she obtained a job while in DBT program (feeling able and competent enough to even identify this as a goal is progress) and she worked for several months before making the decision based on facts (not impulse) to leave job due to specific goals not being met despite her efforts to address/ resolve this. She shared that she is currently in the on-boarding process for a new job. Patient acknowledged skill resulted in "emotional shift", seeing things dialectically as opposed to in 'black-and-white.' She also shared improved ability to cope with/ manage suicidal ideations as progress, describing suicidal ideations as fleeting and decreased in frequency/ intensity. She reported feeling "in control" of these thoughts when they happen, noting "it never feels like I can't handle them." Patient also acknowledged progress made in improved awareness over ineffective thoughts, being able to 'check the facts' and respond more effectively with skills. Patient ended session by expressing gratitude to therapist. She stated, "I do have a future. I have eunice and trust myself and my skills." Patient continues to demonstrate understanding and goals of cumulative DBT skills, making use of mindfulness, interpersonal effectiveness, emotional regulation, and distress tolerance to support goal of effective problem-solving and control over thoughts, emotions, urges/ behavior based on the evidence shared above. [FreeTextEntry1] : Patient is aware of appointment scheduled next week with Dr. Dunn.\par She is aware that after that appointment, care will transition over to Dr. Lynn, who will be acting in the role of therapist (to support goal of reinforcement of DBT skills) and psychiatrist (under the direct supervision of Dr. Dunn).\par Patient was encouraged to continue completion of DBT diary card daily and to continue to utilize DBT skills to support effective problem-solving.\par Appointment with Dr. Lynn scheduled on 3/8/23 at 4:30PM.

## 2023-02-17 NOTE — PLAN
[Dialectical Behavior Therapy] : Dialectical Behavior Therapy  [Psychoeducation] : Psychoeducation  [Skills training (all types)] : Skills training (all types)  [FreeTextEntry2] : *Treatment goals met by patient today demonstrated by progress made in treatment.*\par Treatment plan revision completed to highlight this.\par \par GOAL # 1 OBJECTIVE- 1: Desirae will learn/ implement 3 skills to tolerate unwanted emotions/ urges to prevent emotional intensity from increasing/ mitigate crisis daily. \par Service Description/ Modality: Skills Group/ Individual therapy/ Skills Coaching \par Frequency: Weekly skills group sessions and weekly individual therapy sessions. \par Responsible provider: ERIC BETHEA \par Interventions: \par -Clinicians will provide a validating environment promoting a nonjudgmental atmosphere of self and an understanding of self along with understanding of dialectics in acceptance and change. \par -Clinicians will conduct weekly psycho-educational skills group on: distress tolerance module, review of mindfulness skills, introduction to emotional regulation module. \par -Clinicians will shape and reinforce skillful behaviors for client to generalize in their environment i.e. Homework review, role playing, etc. \par \par GOAL # 1 OBJECTIVE: 2: Desirae will implement mindfulness skills with goal of being in the present moment. She will utilize grounding techniques and other skills if aware of being in the future. \par Service Description/ Modality: Skills Group/ Individual therapy/ Skills Coaching \par Frequency: Weekly skills group sessions and weekly individual therapy sessions. \par Responsible provider: ERIC BETHEA \par Interventions: \par -Clinician will provide shaping, reinforcing for mindfulness skills in sessions. \par -Clinician will explore examples of mindfulness skills practice along with outcomes. \par -Clinician will conduct mindfulness exercises at the start of each skills group to promote and reinforce skills concepts/ effectiveness. \par \par GOAL # 1 OBJECTIVE-3: Desirae will contact therapist if emotional intensity is rated at a 3 or higher for skills coaching. \par Service Description/ Modality: Skills Coaching \par Frequency: As indicated based on emotional intensity/ difficulty problem-solving/ implementing skills \par Responsible provider: ERIC BETHEA \par -Clinician will offer/ encourage use of skills/ crisis coaching when level of emotional intensity exceeds a 3. \par -Clinician will provide skills/ crisis coaching with emphasis on identification/ reinforcement of distress tolerance skills in the moment to assist with goal of toleration/ management of self. \par \par GOAL # 1 OBJECTIVE-4: Desirae will continue to attend appointments with psychiatrist and comply with medication as prescribed to support implementation of PLEASE skill. \par Service Description: Medication management \par Frequency: Monthly or as indicated \par Provider: Dr. Dunn \par Interventions: \par - Patient will attend monthly (or as indicated) medication management appointments. \par - Patient will take medications as prescribed. \par \par How will the provider/individual/guardian know that level of care change is warranted? \par  Upon completion of at least 90 % attendance of weekly skills group and individual treatment, client will have: \par -Have attained and are utilizing a skill set to manage self-injurious/ target behaviors. \par -An average distress tolerance level on weekly diary card at 2 or below. \par -Improvement in interpersonal effectiveness skills as monitored by self-report and observed by DBT team. \par \par Discharge Plan- Indicate the anticipated plan for discharge, including Tx., support services, community resources. For [facility] programs, include a description of a substance abuse relapse prevention plan. \par \par Upon completion of agreement for DBT program which entails Skills Group and Individual Therapy, patient will be evaluated for necessity of services and/or referral to outside services. \par \par Individual has participated in the development of this plan [Yes] No, provide reason: \par Other (s) participated in the development of this plan Yes [No] If Yes, List Names  [de-identified] : Session environment conducive to validating, non-judgmental setting. Therapist introduced patient to newly assigned provider, Dr. Lynn, who joined for the early part of session today. Therapist used open-ended questions to allow patient to reflect on journey in DBT program and examples of progress made in final session with this therapist (and for Dr. Lynn to gain insights into patient treatment history and progress made). Therapist continued to provide cumulative skills reinforcing throughout session with emphasis on patient's strengths and capabilities were highlighted. Therapist and patient had final termination session, reviewing next phase of care plan inclusive of upcoming appointments.  \par \par Patient attended final session with this provider virtually today as scheduled. She appeared engaged as she was introduced to Dr. Lynn who joined the first part of session. Patient shared about her journey in treatment, openly offering examples demonstrating progress in intensive DBT treatment supported by skills learned/ implemented. She acknowledged having difficulty regulating emotions/ behaviors at the start of treatment (being referred to OPD/ DBT program from [facility] following near-fatal suicide attempt). She reported that prior to DBT the longest time she had gone without self-harming was 6 months. She has not acted on self-harming urges for the past 11 months despite having urges in moments of emotional intensity, using skill to support behavioral control. Patient also acknowledged being paralyzed with the thought of working prior to starting DBT program, seeing herself as incapable of working due to her inability to manage stress (having made impulsive decision to quit job previous job due to difficulty tolerating distressing emotions/ situations). Patient shared with therapist and Dr. Lynn that she obtained a job while in DBT program (feeling able and competent enough to even identify this as a goal is progress) and she worked for several months before making the decision based on facts (not impulse) to leave job due to specific goals not being met despite her efforts to address/ resolve this. She shared that she is currently in the on-boarding process for a new job. Patient acknowledged skill resulted in "emotional shift", seeing things dialectically as opposed to in 'black-and-white.' She also shared improved ability to cope with/ manage suicidal ideations as progress, describing suicidal ideations as fleeting and decreased in frequency/ intensity. She reported feeling "in control" of these thoughts when they happen, noting "it never feels like I can't handle them." Patient also acknowledged progress made in improved awareness over ineffective thoughts, being able to 'check the facts' and respond more effectively with skills. Patient ended session by expressing gratitude to therapist. She stated, "I do have a future. I have eunice and trust myself and my skills." Patient continues to demonstrate understanding and goals of cumulative DBT skills, making use of mindfulness, interpersonal effectiveness, emotional regulation, and distress tolerance to support goal of effective problem-solving and control over thoughts, emotions, urges/ behavior based on the evidence shared above. [FreeTextEntry1] : Patient is aware of appointment scheduled next week with Dr. Dunn.\par She is aware that after that appointment, care will transition over to Dr. Lynn, who will be acting in the role of therapist (to support goal of reinforcement of DBT skills) and psychiatrist (under the direct supervision of Dr. Dunn).\par Patient was encouraged to continue completion of DBT diary card daily and to continue to utilize DBT skills to support effective problem-solving.\par Appointment with Dr. Lynn scheduled on 3/8/23 at 4:30PM.

## 2023-02-17 NOTE — REASON FOR VISIT
[Self] : self [Patient preference] : as per patient preference [Telehealth (audio & video) - Individual/Group] : This visit was provided via telehealth using real-time 2-way audio visual technology. [Medical Office: ([facility])___] : The provider was located at the medical office in [unfilled]. [Home] : The patient, [unfilled], was located at home, [unfilled], at the time of the visit. [Verbal consent obtained from patient/other participant(s)] : Verbal consent for telehealth/telephonic services obtained from patient/other participant(s) [Patient] : Patient [FreeTextEntry4] : 2:00PM [FreeTextEntry5] : 2:45PM [FreeTextEntry1] : Final session with provider as recent graduate from DBT program as she prepares to transition care to Dr. Lynn for next phase of treatment.

## 2023-02-21 ENCOUNTER — OUTPATIENT (OUTPATIENT)
Dept: OUTPATIENT SERVICES | Facility: HOSPITAL | Age: 28
LOS: 1 days | End: 2023-02-21
Payer: MEDICAID

## 2023-02-21 ENCOUNTER — APPOINTMENT (OUTPATIENT)
Dept: PSYCHIATRY | Facility: CLINIC | Age: 28
End: 2023-02-21

## 2023-02-21 ENCOUNTER — APPOINTMENT (OUTPATIENT)
Dept: PSYCHIATRY | Facility: CLINIC | Age: 28
End: 2023-02-21
Payer: MEDICAID

## 2023-02-21 PROCEDURE — 99214 OFFICE O/P EST MOD 30 MIN: CPT | Mod: 95

## 2023-02-26 NOTE — REASON FOR VISIT
[Patient preference] : as per patient preference [Telehealth (audio & video) - Individual/Group] : This visit was provided via telehealth using real-time 2-way audio visual technology. [Medical Office: ([facility])___] : The provider was located at the medical office in [unfilled]. [Home] : The patient, [unfilled], was located at home, [unfilled], at the time of the visit. [Patient] : Patient [FreeTextEntry4] : 2pm [FreeTextEntry5] : 2:25pm [FreeTextEntry1] : "I'm feeling OK"

## 2023-02-26 NOTE — PLAN
[Yes. details: ___] : Yes, [unfilled] [Medication education provided] : Medication education provided. [FreeTextEntry4] : GOAL # 1 OBJECTIVE-4: Desirae will continue to attend appointments with psychiatrist and comply with medication as prescribed to support implementation of PLEASE skill. \par Service Description: Medication management \par Frequency: Monthly or as indicated \par Provider: Dr. Dunn \par  [FreeTextEntry5] : \par #). Desirae will be following up further with Dr. Lynn for medication management and therapy as she has graduated from DBT. \par - Continue Wellbutrin 300mg daily for symptoms of depression\par - Continue Vistaril 25mg Qhs PRN insomnia / anxiety symptoms.\par - Continue Ativan 0.5mg, as needed for panic symptoms, reports using it twice over the past month.\par \par \par \par

## 2023-02-26 NOTE — DISCUSSION/SUMMARY
[FreeTextEntry1] : Desirae Edmonds is a 26 year old single  Female, domiciled with family, currently unemployed, with past psychiatric history of Major Depressive Disorder, Borderline Personality Disorder, Anxiety, multiple prior inpatient psychiatric admissions, prior suicide attempts by OD, most recently in outpatient treatment privately with Dr. Cross and Ms. Avril Stokes in the community, who was admitted to ICU and intubated after OD on Bupropion, Effexor, and Trileptal in a suicide attempt, admitted to [facility] from 11/4/21 to 11/10/21, subsequently attended partial hospitalization program (PHP) here at [facility] for aftercare in 12/2021, who presents today for routine med mgmt follow up. \par \par Upon exam, Desirae reported her mood is "good" She denied safety concerns. She reported symptoms of anxiety but reported managing with therapy and grounding techniques which allow her to continue functioning without impairment.

## 2023-02-26 NOTE — HISTORY OF PRESENT ILLNESS
[FreeTextEntry1] : Desirae was seen for psychiatric follow-up.  Upon interview she reported her mood has been good.  She reported feeling positive about her ability to handle the stress of changing jobs recently.  She reported experiencing anxiety but states it does not limit her ability to function.  She reports sleeping well at night.  She reports having experienced more panic attacks than usual over the past month.  She states that there were 2 in the past week and 5 total for the month.  She describes the symptoms as increased heart rate, feeling shaky, sweating, pressure in her chest, but no pain.  She reports feeling as though she might pass out.  She stated they last less than 10 minutes.  She denied noticing a trigger for these episodes but states that she is more prone to panic symptoms when she is sick or if she feels she might be sick.  She reported having symptoms of sickness this past month and attributed the increased frequency to this.  She denied any thoughts of wanting to harm herself or others [FreeTextEntry2] : past psychiatric history of Major Depressive Disorder, Borderline Personality Disorder, Anxiety, multiple prior inpatient psychiatric admissions, prior suicide attempts by OD, most recently in outpatient treatment privately with Dr. Cross and Ms. Avril Stokes in the community, who was admitted to ICU and intubated after OD on Bupropion, Effexor, and Trileptal in a suicide attempt, admitted to [facility] from 11/4/21 to 11/10/21, subsequently attended partial hospitalization program (PHP) here at [facility] for aftercare in 12/2021. \par Desirae has since completed DBT and has remained stable since with follow up appointments for medication management. \par  [FreeTextEntry3] : Effexor\par Wellbutrin\par Trileptal\par Lexapro\par

## 2023-02-26 NOTE — PHYSICAL EXAM
[Cooperative] : cooperative [Depressed] : depressed [Anxious] : anxious [Full] : full [Clear] : clear [Linear/Goal Directed] : linear/goal directed [Average] : average [WNL] : within normal limits [FreeTextEntry5] : Virtual encounter. Denied tremor or motor symptoms.  [FreeTextEntry8] : "anxious"

## 2023-02-28 DIAGNOSIS — F33.2 MAJOR DEPRESSIVE DISORDER, RECURRENT SEVERE WITHOUT PSYCHOTIC FEATURES: ICD-10-CM

## 2023-02-28 DIAGNOSIS — F60.3 BORDERLINE PERSONALITY DISORDER: ICD-10-CM

## 2023-03-01 DIAGNOSIS — F33.2 MAJOR DEPRESSIVE DISORDER, RECURRENT SEVERE WITHOUT PSYCHOTIC FEATURES: ICD-10-CM

## 2023-03-01 DIAGNOSIS — F60.3 BORDERLINE PERSONALITY DISORDER: ICD-10-CM

## 2023-03-07 ENCOUNTER — APPOINTMENT (OUTPATIENT)
Dept: UROLOGY | Facility: CLINIC | Age: 28
End: 2023-03-07
Payer: MEDICAID

## 2023-03-07 VITALS
SYSTOLIC BLOOD PRESSURE: 162 MMHG | OXYGEN SATURATION: 99 % | HEIGHT: 65 IN | WEIGHT: 112 LBS | BODY MASS INDEX: 18.66 KG/M2 | RESPIRATION RATE: 16 BRPM | DIASTOLIC BLOOD PRESSURE: 93 MMHG | HEART RATE: 158 BPM

## 2023-03-07 DIAGNOSIS — R35.0 FREQUENCY OF MICTURITION: ICD-10-CM

## 2023-03-07 PROCEDURE — 51798 US URINE CAPACITY MEASURE: CPT

## 2023-03-07 PROCEDURE — 99204 OFFICE O/P NEW MOD 45 MIN: CPT | Mod: 25

## 2023-03-07 NOTE — PHYSICAL EXAM
[General Appearance - Well Developed] : well developed [General Appearance - Well Nourished] : well nourished [Normal Appearance] : normal appearance [Well Groomed] : well groomed [General Appearance - In No Acute Distress] : no acute distress [FreeTextEntry1] : HR initially elevated as patient reported anxiety but came down to 80s over course of visit [Edema] : no peripheral edema [] : no respiratory distress [Respiration, Rhythm And Depth] : normal respiratory rhythm and effort [Exaggerated Use Of Accessory Muscles For Inspiration] : no accessory muscle use [Abdomen Soft] : soft [Abdomen Tenderness] : non-tender [Costovertebral Angle Tenderness] : no ~M costovertebral angle tenderness [Urethral Meatus] : normal urethra [Urinary Bladder Findings] : the bladder was normal on palpation [External Female Genitalia] : normal external genitalia [Vagina] : normal vaginal exam [Normal Station and Gait] : the gait and station were normal for the patient's age

## 2023-03-07 NOTE — ASSESSMENT
[FreeTextEntry1] : 27 year old female with one month of dysuria, urinary frequency, intermittent back pain/flank pain. urine cultures negative over this time but prescribed 2 course of antibiotics. Then last week, felt passed stone in urine and felt significant improvement in symptoms. Still with mild frequency and dysuria, but largely resolved. No fevers, chills, N/V over this time. No current back pain or flank pain. We discussed possibility of kidney stone passed, but symptoms not classic, and do not have what she was in urine for analysis. However, reassuring that no evidence of infection over this time, and that symptoms seem to be largely resolved. Will send urine studies and renal bladder US to further evaluate if any evidence of hydro, residual stone. Discussed signs and symptoms for which patient should seek emergency care - fevers, chills, N/V, uncontrolled flank pain.\par \par - UA\par - urine culture\par - renal bladder US\par - telemedicine after US\par - discussed warning signs for which to present to ED

## 2023-03-07 NOTE — HISTORY OF PRESENT ILLNESS
[FreeTextEntry1] : Patient Name: Desirae Edmonds\par Date of Birth: [birth year]\par Contact Number: [phone]\par ------------------------------------------------------------------------------\par Date of Initial Visit: 3/7/23\par Referring Provider/PCP: none\par ------------------------------------------------------------------------------\par \par CC: urinary frequency, dysuria\par \par HPI: 27 year old reports about a month ago patient had urinary frequency, burning, incomplete emptying, mild back pain. Prescribed 5 days of cipro, but urine was ultimately negative. Patient reports symptoms felt like they improved, but not completely resolved. Then two weeks later felt frequency, burning, lower back pain, bilateral flank pain, went to urgent care, urine sample again negative, was prescribed 10 days amoxicillin. But symptoms were not gone. Then about a week ago patient saw something in the urine that looked like a stone and felt immediate relief of symptoms. No imaging done over this time.\par \par Since that time, patient reports symptoms have largely improved. Mild frequency, mild burning, but every day improves. No flank pain or back pain. No N/V. No fevers or chills.\par \par No history of kidney stones. No history of blood in urine. Patient reports had UTI in the past in college but not recently. No history of febrile UTI or pyelo. \par \par Patient is sexually active, uses protection, no concern for STIs.\par \par Patient drinks 2 cups coffee/day, rare carbonated drinks, occasional tea, rare spicy food, rare citrus.\par \par PVR 0\par \par PMH: depression, anxiety\par PSH: open R inguinal hernia repair 2016\par Family History: adopted, no known family history\par Social: lives with mom, , non-smoker, social alcohol, marijuana daily\par Meds: Wellbutrin, spironolactone. birth control, ativan prn, vistaril prn\par Allergies: NKDA\par ROS: no fevers, chills, N/V

## 2023-03-08 ENCOUNTER — OUTPATIENT (OUTPATIENT)
Dept: OUTPATIENT SERVICES | Facility: HOSPITAL | Age: 28
LOS: 1 days | End: 2023-03-08
Payer: MEDICAID

## 2023-03-08 ENCOUNTER — APPOINTMENT (OUTPATIENT)
Dept: PSYCHIATRY | Facility: CLINIC | Age: 28
End: 2023-03-08

## 2023-03-08 DIAGNOSIS — F41.1 GENERALIZED ANXIETY DISORDER: ICD-10-CM

## 2023-03-08 DIAGNOSIS — F60.3 BORDERLINE PERSONALITY DISORDER: ICD-10-CM

## 2023-03-08 DIAGNOSIS — Z00.8 ENCOUNTER FOR OTHER GENERAL EXAMINATION: ICD-10-CM

## 2023-03-08 DIAGNOSIS — F33.1 MAJOR DEPRESSIVE DISORDER, RECURRENT, MODERATE: ICD-10-CM

## 2023-03-08 LAB
APPEARANCE: CLEAR
BACTERIA: NEGATIVE
BILIRUBIN URINE: NEGATIVE
BLOOD URINE: NEGATIVE
COLOR: COLORLESS
GLUCOSE QUALITATIVE U: NEGATIVE
HYALINE CASTS: 0 /LPF
KETONES URINE: NEGATIVE
LEUKOCYTE ESTERASE URINE: NEGATIVE
MICROSCOPIC-UA: NORMAL
NITRITE URINE: NEGATIVE
PH URINE: 6.5
PROTEIN URINE: NEGATIVE
RED BLOOD CELLS URINE: 2 /HPF
SPECIFIC GRAVITY URINE: 1.01
SQUAMOUS EPITHELIAL CELLS: 1 /HPF
UROBILINOGEN URINE: NORMAL
WHITE BLOOD CELLS URINE: 0 /HPF

## 2023-03-08 PROCEDURE — 99214 OFFICE O/P EST MOD 30 MIN: CPT

## 2023-03-08 NOTE — HISTORY OF PRESENT ILLNESS
[FreeTextEntry1] : Desirae was seen for psychiatric follow-up.  Upon interview she reported she has been feeling "better." She reports she had increased anxiety related to feeling phsyicially unwell, she reporting thinking she had a UTI and was prescirbed antibiotics wihtout any iprovement. She reports however she found out it was a kidney stone and has since been symptomatic free since passing it. She is to fu with urology for above. She notes that hse continues to also have panic attacks, reporting that they sometimes come on expectedly or unexpectedly. She noted some triggers including doctor visits (due to fears of health issues) and also reports a fight with her BF as a trigger. She reports having 3 since last visit and notes that she was able to use coping skills to help alleviate sxs. She continues ot also have urges to self harm once weekly, but reports using DBT skills to not engage in self harm. She denies any recent self harm. She denies SI.  She denied feeling depressed or hopeless.  She reported feeling anxious usually around social interactions such as going to dinner or to a birthday party. She denied feeling as though the anxiety was interfering with her life or causing her to not be able to do what she wanted.  She reported enjoying social connections she has made with friends, and spending time with her boyfriend.  She denied any safety concerns at this time.  She denied any thoughts of wanting to harm herself or anyone else. Remains future oriented, reports looking forward to starting new job next Tuesday. She wasn’t able to provide provider with apt time for next week as she does not know her schedule. She will reach provider via phone to let her know what time works best.  [FreeTextEntry2] : past psychiatric history of Major Depressive Disorder, Borderline Personality Disorder, Anxiety, multiple prior inpatient psychiatric admissions, prior suicide attempts by OD, most recently in outpatient treatment privately with Dr. Cross and Ms. Avril Stokes in the community, who was admitted to ICU and intubated after OD on Bupropion, Effexor, and Trileptal in a suicide attempt, admitted to [facility] IPP from 11/4/21 to 11/10/21, subsequently attended partial hospitalization program (PHP) here at [facility] for aftercare in 12/2021. \par Desirae has since completed DBT and has remained stable since with follow up appointments for medication management. \par  [FreeTextEntry3] : Effexor\par Wellbutrin\par Trileptal\par Lexapro\par

## 2023-03-08 NOTE — PHYSICAL EXAM
[FreeTextEntry5] : Virtual encounter. Denied tremor or motor symptoms.  [Depressed] : depressed [Cooperative] : cooperative [Anxious] : anxious [Full] : full [Clear] : clear [Linear/Goal Directed] : linear/goal directed [Average] : average [WNL] : within normal limits [FreeTextEntry8] : "anxious"

## 2023-03-08 NOTE — DISCUSSION/SUMMARY
[FreeTextEntry1] : Desirae Edmonds is a 26 year old single  Female, domiciled with family, currently unemployed, with past psychiatric history of Major Depressive Disorder, Borderline Personality Disorder, Anxiety, multiple prior inpatient psychiatric admissions, prior suicide attempts by OD, most recently in outpatient treatment privately with Dr. Cross and Ms. Avril Stokes in the community, who was admitted to ICU and intubated after OD on Bupropion, Effexor, and Trileptal in a suicide attempt, admitted to [facility] IPP from 11/4/21 to 11/10/21, subsequently attended partial hospitalization program (PHP) here at [facility] for aftercare in 12/2021, who presents today for routine med mgmt follow up. \par \par Upon exam, Desirae reported her stable mood. She denied safety concerns. She reported symptoms of anxiety but reported managing with therapy and grounding techniques which allow her to continue functioning without impairment. \par \par No suicidal or homicidal thoughts. No overt psychosis or noelle on exam. Patient has appropriate protective factors in place. Is engaged in his treatment. No acute safety concerns. Outpatient level of care remains appropriate.\par

## 2023-03-08 NOTE — PLAN
[Yes. details: ___] : Yes, [unfilled] [Medication education provided] : Medication education provided. [FreeTextEntry4] : GOAL # 1 OBJECTIVE-4: Desirae will continue to attend appointments with psychiatrist and comply with medication as prescribed to support implementation of PLEASE skill. \par Service Description: Medication management \par Frequency: biweekly or as indicated \par Provider: Dr. Lynn\par  [FreeTextEntry5] : \par #). Desirae agrees to follow up in 1 week \par - Continue Wellbutrin 300mg daily for symptoms of depression\par - Continue Vistaril 25mg-50mg Qhs PRN insomnia / anxiety symptoms.\par \par \par \par

## 2023-03-09 LAB — BACTERIA UR CULT: NORMAL

## 2023-03-21 ENCOUNTER — APPOINTMENT (OUTPATIENT)
Dept: UROLOGY | Facility: CLINIC | Age: 28
End: 2023-03-21

## 2023-03-27 ENCOUNTER — APPOINTMENT (OUTPATIENT)
Dept: PSYCHIATRY | Facility: CLINIC | Age: 28
End: 2023-03-27

## 2023-03-27 ENCOUNTER — NON-APPOINTMENT (OUTPATIENT)
Age: 28
End: 2023-03-27

## 2023-03-27 NOTE — DISCUSSION/SUMMARY
[FreeTextEntry1] : Patient emailed provider requesting to cancel appointment today due to work conflict. Patient to reach back to provider to provide time for new appointment.

## 2023-04-04 ENCOUNTER — APPOINTMENT (OUTPATIENT)
Dept: PSYCHIATRY | Facility: CLINIC | Age: 28
End: 2023-04-04

## 2023-04-04 ENCOUNTER — OUTPATIENT (OUTPATIENT)
Dept: OUTPATIENT SERVICES | Facility: HOSPITAL | Age: 28
LOS: 1 days | End: 2023-04-04
Payer: MEDICAID

## 2023-04-04 DIAGNOSIS — F33.2 MAJOR DEPRESSIVE DISORDER, RECURRENT SEVERE W/OUT PSYCHOTIC FEATURES: ICD-10-CM

## 2023-04-04 DIAGNOSIS — F60.3 BORDERLINE PERSONALITY DISORDER: ICD-10-CM

## 2023-04-04 DIAGNOSIS — F41.1 GENERALIZED ANXIETY DISORDER: ICD-10-CM

## 2023-04-04 PROCEDURE — 99214 OFFICE O/P EST MOD 30 MIN: CPT | Mod: 95

## 2023-04-04 NOTE — DISCUSSION/SUMMARY
[FreeTextEntry1] : Desirae Edmonds is a 26 year old single  Female, domiciled with family, currently unemployed, with past psychiatric history of Major Depressive Disorder, Borderline Personality Disorder, Anxiety, multiple prior inpatient psychiatric admissions, prior suicide attempts by OD, most recently in outpatient treatment privately with Dr. Cross and Ms. Avril Stokes in the community, who was admitted to ICU and intubated after OD on Bupropion, Effexor, and Trileptal in a suicide attempt, admitted to [facility] IPP from 11/4/21 to 11/10/21, subsequently attended partial hospitalization program (PHP) here at [facility] for aftercare in 12/2021, who presents today for routine med mgmt follow up. \par \par Upon exam, Desirae reported her stable mood. She denied safety concerns. She reported symptoms of anxiety but reported managing with therapy and grounding techniques which allow her to continue functioning without impairment. \par \par No suicidal or homicidal thoughts. No overt psychosis or noelle on exam. Patient has appropriate protective factors in place. Is engaged in his treatment. No acute safety concerns. Outpatient level of care remains appropriate.\par

## 2023-04-04 NOTE — ED ADULT TRIAGE NOTE - NS_BHTRGCALCULATEDSCORE_ED_A_ED_FT
"Oncology Rooming Note    April 4, 2023 1:23 PM   Keely Arana is a 66 year old female who presents for:    Chief Complaint   Patient presents with     Oncology Clinic Visit     Breast Ca     Initial Vitals: /74   Pulse 92   Temp 97.6  F (36.4  C) (Oral)   Resp 16   Wt 68 kg (150 lb)   SpO2 99%   BMI 23.54 kg/m   Estimated body mass index is 23.54 kg/m  as calculated from the following:    Height as of 5/4/21: 1.7 m (5' 6.93\").    Weight as of this encounter: 68 kg (150 lb). Body surface area is 1.79 meters squared.  No Pain (0) Comment: Data Unavailable   No LMP recorded. Patient is postmenopausal.  Allergies reviewed: Yes  Medications reviewed: Yes    Medications: Medication refills not needed today.  Pharmacy name entered into [facility]:    [id number] - [place], [place] - [address] [facility] - [place], [place] - [address]    Clinical concerns:       Fanny Mata CMA              "
2

## 2023-04-04 NOTE — HISTORY OF PRESENT ILLNESS
[FreeTextEntry1] : Desirae was seen for psychiatric follow-up. Upon interview patient reported that her mood has been overall stable. She reported increased anxiety in the setting of a new job, but reports that she has been managing. She notes that it was difficult to juggle her work with her relationship and her social life but notes that she has been finding a right balance. She notes that she continues to utilize DBT skills. She reports that she has not required ativan since last visit and has been able to manage panic attacks with breathing techniques. She denied any feelings of hopelessness or persistent feelings of sadness.  She denied any safety concerns at this time.  She denied any thoughts of wanting to harm herself or anyone else. She reports adequate sleep/appetite. Pt has been taking Wellbutrin at 11am, but is agreeable to taking it earlier in the morning. She reports compliance with medication and denies any adverse side effects.  [FreeTextEntry2] : past psychiatric history of Major Depressive Disorder, Borderline Personality Disorder, Anxiety, multiple prior inpatient psychiatric admissions, prior suicide attempts by OD, most recently in outpatient treatment privately with Dr. Cross and Ms. Avril Stokes in the community, who was admitted to ICU and intubated after OD on Bupropion, Effexor, and Trileptal in a suicide attempt, admitted to [facility] IPP from 11/4/21 to 11/10/21, subsequently attended partial hospitalization program (PHP) here at [facility] for aftercare in 12/2021. \par Desirae has since completed DBT and has remained stable since with follow up appointments for medication management. \par  [FreeTextEntry3] : Effexor\par Wellbutrin\par Trileptal\par Lexapro\par

## 2023-04-04 NOTE — PLAN
[Yes. details: ___] : Yes, [unfilled] [Medication education provided] : Medication education provided. [FreeTextEntry4] : GOAL # 1 OBJECTIVE-4: Desirae will continue to attend appointments with psychiatrist and comply with medication as prescribed to support implementation of PLEASE skill. \par Service Description: Medication management \par Frequency: biweekly or as indicated \par Provider: Dr. Lynn\par  [FreeTextEntry5] : \par #). Desirae agrees to follow up in 2 weeks\par - Continue Wellbutrin xL 300mg daily for symptoms of depression\par - Continue Vistaril 25mg-50mg Qhs PRN insomnia / anxiety symptoms.\par -continue ativan 0.5mg PRN (#30 last dispensed 12/9/2022)\par \par \par \par

## 2023-04-05 DIAGNOSIS — F60.3 BORDERLINE PERSONALITY DISORDER: ICD-10-CM

## 2023-04-17 ENCOUNTER — APPOINTMENT (OUTPATIENT)
Dept: PSYCHIATRY | Facility: CLINIC | Age: 28
End: 2023-04-17

## 2023-04-17 ENCOUNTER — OUTPATIENT (OUTPATIENT)
Dept: OUTPATIENT SERVICES | Facility: HOSPITAL | Age: 28
LOS: 1 days | End: 2023-04-17
Payer: MEDICAID

## 2023-04-17 DIAGNOSIS — F60.3 BORDERLINE PERSONALITY DISORDER: ICD-10-CM

## 2023-04-17 DIAGNOSIS — F41.1 GENERALIZED ANXIETY DISORDER: ICD-10-CM

## 2023-04-17 PROCEDURE — 99214 OFFICE O/P EST MOD 30 MIN: CPT | Mod: 95

## 2023-04-17 NOTE — HISTORY OF PRESENT ILLNESS
[FreeTextEntry1] : Desirae was seen for psychiatric follow-up. Upon interview patient reported that her mood has been overall stable. She reported increased anxiety in the setting of a new job, but reports that she has been managing. She notes that she has been utilizing coping skills to help manage manage her anxiety.  She notes that it was difficult to juggle her work with her relationship and her social life but notes that she has been finding a right balance. She notes that she continues to utilize DBT skills. She reports that she has not required Ativan once since last visits. She notes that she has been utilizing deep breathing to help manage anxiety. She denied any feelings of hopelessness or persistent feelings of sadness.  She denied any safety concerns at this time.  She denied any thoughts of wanting to harm herself or anyone else. She reports adequate sleep/appetite. Pt has been taking Wellbutrin earlier in the morning and notes improvement in sleep, reporting sustained sleep. She reports compliance with medication and denies any adverse side effects.  [FreeTextEntry3] : Effexor\par Wellbutrin\par Trileptal\par Lexapro\par  [FreeTextEntry2] : past psychiatric history of Major Depressive Disorder, Borderline Personality Disorder, Anxiety, multiple prior inpatient psychiatric admissions, prior suicide attempts by OD, most recently in outpatient treatment privately with Dr. Cross and Ms. Avril Stokes in the community, who was admitted to ICU and intubated after OD on Bupropion, Effexor, and Trileptal in a suicide attempt, admitted to [facility] IPP from 11/4/21 to 11/10/21, subsequently attended partial hospitalization program (PHP) here at [facility] for aftercare in 12/2021. \par Desirae has since completed DBT and has remained stable since with follow up appointments for medication management. \par

## 2023-04-17 NOTE — DISCUSSION/SUMMARY
[FreeTextEntry1] : Desirae Edmonds is a 27 year old single  Female, domiciled with family, currently unemployed, with past psychiatric history of Major Depressive Disorder, Borderline Personality Disorder, Anxiety, multiple prior inpatient psychiatric admissions, prior suicide attempts by OD, most recently in outpatient treatment privately with Dr. Cross and Ms. Avril Stokes in the community, who was admitted to ICU and intubated after OD on Bupropion, Effexor, and Trileptal in a suicide attempt, admitted to [facility] IPP from 11/4/21 to 11/10/21, subsequently attended partial hospitalization program (PHP) here at [facility] for aftercare in 12/2021, who presents today for routine med mgmt follow up. \par \par Upon exam, Desirae reported her stable mood. She denied safety concerns. She reported symptoms of anxiety but reported managing with therapy and grounding techniques which allow her to continue functioning without impairment. \par \par No suicidal or homicidal thoughts. No overt psychosis or noelle on exam. Patient has appropriate protective factors in place. Is engaged in his treatment. No acute safety concerns. Outpatient level of care remains appropriate.\par

## 2023-04-17 NOTE — PHYSICAL EXAM
[FreeTextEntry5] : Virtual encounter. Denied tremor or motor symptoms.  [Cooperative] : cooperative [Depressed] : depressed [Anxious] : anxious [Full] : full [Clear] : clear [Linear/Goal Directed] : linear/goal directed [Average] : average [WNL] : within normal limits [FreeTextEntry8] : "anxious"

## 2023-04-17 NOTE — PLAN
[Medication education provided] : Medication education provided. [Yes. details: ___] : Yes, [unfilled] [FreeTextEntry4] : GOAL # 1 OBJECTIVE-4: Desirae will continue to attend appointments with psychiatrist and comply with medication as prescribed to support implementation of PLEASE skill. \par Service Description: Medication management \par Frequency: biweekly or as indicated \par \par  [FreeTextEntry5] : #). Desirae agrees to follow up in 2 weeks\par - Continue Wellbutrin xL 300mg daily for symptoms of depression\par - Continue Vistaril 25mg-50mg Qhs PRN insomnia / anxiety symptoms.\par -continue ativan 0.5mg PRN (#30 last dispensed 12/9/2022)\par -RTC 3 weeks -> patient to reach out to provider once she has her schedule to make next apt\par \par \par

## 2023-04-18 DIAGNOSIS — F41.1 GENERALIZED ANXIETY DISORDER: ICD-10-CM

## 2023-04-18 DIAGNOSIS — F60.3 BORDERLINE PERSONALITY DISORDER: ICD-10-CM

## 2023-05-08 ENCOUNTER — NON-APPOINTMENT (OUTPATIENT)
Age: 28
End: 2023-05-08

## 2023-05-08 NOTE — DISCUSSION/SUMMARY
[FreeTextEntry1] : Provider reached out to patient as on last visit, patient was to call provider to give time for next apt, pt to speak to work to find a time during lunch break to meet with provider in person (patient's preference). Pt however has not since reached out and provider called today and left a VM for callback.

## 2023-05-22 ENCOUNTER — APPOINTMENT (OUTPATIENT)
Dept: PSYCHIATRY | Facility: CLINIC | Age: 28
End: 2023-05-22
Payer: MEDICAID

## 2023-05-22 ENCOUNTER — OUTPATIENT (OUTPATIENT)
Dept: OUTPATIENT SERVICES | Facility: HOSPITAL | Age: 28
LOS: 1 days | End: 2023-05-22
Payer: MEDICAID

## 2023-05-22 DIAGNOSIS — F41.1 GENERALIZED ANXIETY DISORDER: ICD-10-CM

## 2023-05-22 PROCEDURE — ZZZZZ: CPT

## 2023-05-22 PROCEDURE — 99214 OFFICE O/P EST MOD 30 MIN: CPT | Mod: 95

## 2023-05-22 NOTE — DISCUSSION/SUMMARY
[FreeTextEntry1] : Desirae Edmonds is a 27 year old single  Female, domiciled with family, currently unemployed, with past psychiatric history of Major Depressive Disorder, Borderline Personality Disorder, Anxiety, multiple prior inpatient psychiatric admissions, prior suicide attempts by OD, most recently in outpatient treatment privately with Dr. Cross and Ms. Avril Stokes in the community, who was admitted to ICU and intubated after OD on Bupropion, Effexor, and Trileptal in a suicide attempt, admitted to [facility] IPP from 11/4/21 to 11/10/21, subsequently attended partial hospitalization program (PHP) here at [facility] for aftercare in 12/2021, who presents today for routine med mgmt follow up. \par \par Upon exam, Desirae reported her stable mood. She denied safety concerns. She reported symptoms of anxiety but reported managing with therapy and grounding techniques which allow her to continue functioning without impairment. \par \par No suicidal or homicidal thoughts. No overt psychosis or noelle on exam. Patient has appropriate protective factors in place. Is engaged in his treatment. No acute safety concerns. Outpatient level of care remains appropriate.\par

## 2023-05-22 NOTE — HISTORY OF PRESENT ILLNESS
[FreeTextEntry1] : Desirae was seen for psychiatric follow-up. Upon interview patient reported that her mood has been overall stable. She reported increased anxiety in the setting of increased job requirements after two staff member were recently laid off, but reports that she has been managing well and was recently given positive feedback from manager and was told that she will likely get a permanent position.. She notes that she has been utilizing coping skills to help manage manage her anxiety.  She notes that she continues to utilize DBT skills. She reports that she continues to use ativan sparingly. She notes that she has been utilizing deep breathing to help manage anxiety. She denied any feelings of hopelessness or persistent feelings of sadness.  She denied any safety concerns at this time.  She denied any thoughts of wanting to harm herself or anyone else. She reports adequate sleep/appetite. She reports adequate sleep, although notes only 5 hours of sleep, reports no issues with energy or fatigue. She reports compliance with medication and denies any adverse side effects. She continues to smoke cannabis daily to help with sleep and appetite, also drinks socially, but otherwise denies any substance use.  [FreeTextEntry2] : past psychiatric history of Major Depressive Disorder, Borderline Personality Disorder, Anxiety, multiple prior inpatient psychiatric admissions, prior suicide attempts by OD, most recently in outpatient treatment privately with Dr. Cross and Ms. Avril Stokes in the community, who was admitted to ICU and intubated after OD on Bupropion, Effexor, and Trileptal in a suicide attempt, admitted to [facility] IPP from 11/4/21 to 11/10/21, subsequently attended partial hospitalization program (PHP) here at [facility] for aftercare in 12/2021. \par Desirae has since completed DBT and has remained stable since with follow up appointments for medication management. \par  [FreeTextEntry3] : Effexor\par Wellbutrin\par Trileptal\par Lexapro\par

## 2023-05-22 NOTE — PLAN
[Yes. details: ___] : Yes, [unfilled] [Medication education provided] : Medication education provided. [FreeTextEntry4] : GOAL # 1 OBJECTIVE-4: Desirae will continue to attend appointments with psychiatrist and comply with medication as prescribed to support implementation of PLEASE skill. \par Service Description: Medication management \par Frequency: biweekly or as indicated \par \par  [FreeTextEntry5] : #). Desirae agrees to follow up in 2 weeks\par - Continue Wellbutrin xL 300mg daily for symptoms of depression\par - Continue Vistaril 25mg-50mg Qhs PRN insomnia / anxiety symptoms.\par -continue ativan 0.5mg PRN (#30 last dispensed 12/9/2022)\par -RTC 3 weeks -> patient to reach out to provider once she has her schedule to make next apt\par \par \par

## 2023-05-23 DIAGNOSIS — F41.1 GENERALIZED ANXIETY DISORDER: ICD-10-CM

## 2023-06-12 ENCOUNTER — APPOINTMENT (OUTPATIENT)
Dept: PSYCHIATRY | Facility: CLINIC | Age: 28
End: 2023-06-12
Payer: MEDICAID

## 2023-06-12 ENCOUNTER — OUTPATIENT (OUTPATIENT)
Dept: OUTPATIENT SERVICES | Facility: HOSPITAL | Age: 28
LOS: 1 days | End: 2023-06-12
Payer: MEDICAID

## 2023-06-12 DIAGNOSIS — F41.1 GENERALIZED ANXIETY DISORDER: ICD-10-CM

## 2023-06-12 DIAGNOSIS — F60.3 BORDERLINE PERSONALITY DISORDER: ICD-10-CM

## 2023-06-12 PROCEDURE — ZZZZZ: CPT

## 2023-06-12 PROCEDURE — 99213 OFFICE O/P EST LOW 20 MIN: CPT | Mod: 95

## 2023-06-12 NOTE — DISCUSSION/SUMMARY
[FreeTextEntry1] : Desirae Edmonds is a 27 year old single  Female, domiciled with family, currently unemployed, with past psychiatric history of Major Depressive Disorder, Borderline Personality Disorder, Anxiety, multiple prior inpatient psychiatric admissions, prior suicide attempts by OD, most recently in outpatient treatment privately with Dr. Cross and Ms. Avril Stokes in the community, who was admitted to ICU and intubated after OD on Bupropion, Effexor, and Trileptal in a suicide attempt, admitted to [facility] IPP from 11/4/21 to 11/10/21, subsequently attended partial hospitalization program (PHP) here at [facility] for aftercare in 12/2021, who presents today for routine med mgmt follow up. \par \par Upon exam, Desirae reported her stable mood. She denied safety concerns. She reported symptoms of anxiety but reported managing with therapy and grounding techniques which allow her to continue functioning without impairment. Would consider another trial with an SSRI, possibly zoloft, should her anxiety not improve. \par \par No suicidal or homicidal thoughts. No overt psychosis or noelle on exam. Patient has appropriate protective factors in place. Is engaged in his treatment. No acute safety concerns. Outpatient level of care remains appropriate.\par

## 2023-06-12 NOTE — PLAN
[Yes. details: ___] : Yes, [unfilled] [Medication education provided] : Medication education provided. [FreeTextEntry4] : GOAL # 1 OBJECTIVE-4: Desirae will continue to attend appointments with psychiatrist and comply with medication as prescribed to support implementation of PLEASE skill. \par Service Description: Medication management \par \par \par  [FreeTextEntry5] : - Continue Wellbutrin xL 300mg daily for symptoms of depression\par - Continue Vistaril 25mg-50mg Qhs PRN insomnia / anxiety symptoms.\par -continue ativan 0.5mg PRN (#30 last dispensed 12/9/2022)\par -RTC 3-4 weeks \par -transition of care discussed \par \par \par

## 2023-06-13 DIAGNOSIS — F60.3 BORDERLINE PERSONALITY DISORDER: ICD-10-CM

## 2023-07-24 ENCOUNTER — APPOINTMENT (OUTPATIENT)
Dept: PSYCHIATRY | Facility: CLINIC | Age: 28
End: 2023-07-24

## 2023-08-02 ENCOUNTER — APPOINTMENT (OUTPATIENT)
Dept: PSYCHIATRY | Facility: CLINIC | Age: 28
End: 2023-08-02
Payer: MEDICAID

## 2023-08-02 ENCOUNTER — OUTPATIENT (OUTPATIENT)
Dept: OUTPATIENT SERVICES | Facility: HOSPITAL | Age: 28
LOS: 1 days | End: 2023-08-02
Payer: MEDICAID

## 2023-08-02 DIAGNOSIS — F33.1 MAJOR DEPRESSIVE DISORDER, RECURRENT, MODERATE: ICD-10-CM

## 2023-08-02 PROCEDURE — 90832 PSYTX W PT 30 MINUTES: CPT

## 2023-08-02 PROCEDURE — ZZZZZ: CPT

## 2023-08-02 PROCEDURE — 99213 OFFICE O/P EST LOW 20 MIN: CPT | Mod: 95

## 2023-08-02 NOTE — PHYSICAL EXAM
[Cooperative] : cooperative [Anxious] : anxious [Full] : full [Clear] : clear [Linear/Goal Directed] : linear/goal directed [Average] : average [WNL] : within normal limits [FreeTextEntry5] : Virtual encounter. Denied tremor or motor symptoms.  [FreeTextEntry8] : "anxious"

## 2023-08-02 NOTE — REASON FOR VISIT
[Patient] : Patient [Patient preference] : as per patient preference [Continuing, patient not seen in-person within last 12 months (provide details below)] : Telehealth services are continuing, patient not seen in-person within last 12 months.  [Telehealth (audio & video) - Individual/Group] : This visit was provided via telehealth using real-time 2-way audio visual technology. [Other Location: e.g. Home (Enter Location, City,State)___] : The patient, [unfilled], was located at [unfilled] at the time of the visit. [Verbal consent obtained from patient/other participant(s)] : Verbal consent for telehealth/telephonic services obtained from patient/other participant(s) [FreeTextEntry4] : 4:30 pm [FreeTextEntry5] : 4:56 pm [FreeTextEntry1] : "I am better "

## 2023-08-02 NOTE — DISCUSSION/SUMMARY
[FreeTextEntry1] : Desirae Edmonds is a 27 year old single  Female, domiciled with family, currently unemployed, with past psychiatric history of Major Depressive Disorder, Borderline Personality Disorder, Anxiety, multiple prior inpatient psychiatric admissions, prior suicide attempts by OD, most recently in outpatient treatment privately with Dr. Cross and Ms. Avril Stokes in the community, who was admitted to ICU and intubated after OD on Bupropion, Effexor, and Trileptal in a suicide attempt, admitted to [facility] IPP from 11/4/21 to 11/10/21, subsequently attended partial hospitalization program (PHP) here at [facility] for aftercare in 12/2021, who presents today for routine med mgmt follow up. \par  \par  Upon exam, Desirae reported her stable mood. She denied safety concerns. She reported symptoms of anxiety but reported managing with therapy and grounding techniques which allow her to continue functioning without impairment. Would consider another trial with an SSRI, possibly zoloft, should her anxiety not improve. \par  \par  No suicidal or homicidal thoughts. No overt psychosis or noelle on exam. Patient has appropriate protective factors in place. Is engaged in his treatment. No acute safety concerns. Outpatient level of care remains appropriate.\par

## 2023-08-02 NOTE — HISTORY OF PRESENT ILLNESS
[FreeTextEntry1] : Upon interview patient reported that her mood has been overall stable. Continues to have some issues w/ anxiety about going to work. Mentions, "Its not as bad as it used to be". Describes a pervasive pattern of anxiety and affective dysregulation that started when she was 10 y/o. Mentions that the meds are "Right" and she has been doing well because of it. Mentions being at new job for the past 6 months and states that she enjoys it. Relays having her mom is a big protective factor and a huge support for her. Does mention having some low mood during the day, but states, "It comes and goes, I don't dwell on it". Relays good appetite. Mentions no issues in falling asleep but does state sometimes she wakes up earlier than desired and has a hard time going back to sleep. Is future oriented. She reports compliance with medication and denies any adverse side effects. She continues to smoke cannabis daily to help with sleep and appetite, also drinks socially, but otherwise denies any substance use. She remains future oriented, has goals to eventually move out of her mother's place.  [FreeTextEntry2] : past psychiatric history of Major Depressive Disorder, Borderline Personality Disorder, Anxiety, multiple prior inpatient psychiatric admissions, prior suicide attempts by OD, most recently in outpatient treatment privately with Dr. Cross and Ms. Avril Stokes in the community, who was admitted to ICU and intubated after OD on Bupropion, Effexor, and Trileptal in a suicide attempt, admitted to [facility] IPP from 11/4/21 to 11/10/21, subsequently attended partial hospitalization program (PHP) here at [facility] for aftercare in 12/2021. \par  Desirae has since completed DBT and has remained stable since with follow up appointments for medication management. \par   [FreeTextEntry3] : Effexor\par  Wellbutrin\par  Trileptal\par  Lexapro\par

## 2023-08-02 NOTE — PLAN
[Yes. details: ___] : Yes, [unfilled] [Medication education provided] : Medication education provided. [FreeTextEntry4] : GOAL # 1 OBJECTIVE-4: Desirae will continue to attend appointments with psychiatrist and comply with medication as prescribed to support implementation of PLEASE skill. \par  Service Description: Medication management \par  \par  \par   [FreeTextEntry5] : - Continue Wellbutrin xL 300mg daily for symptoms of depression - Continue Vistaril 25mg-50mg Qhs PRN insomnia / anxiety symptoms. -continue ativan 0.5mg PRN (#30 last dispensed 12/9/2022) -RTC 3-4 weeks

## 2023-08-03 DIAGNOSIS — F33.1 MAJOR DEPRESSIVE DISORDER, RECURRENT, MODERATE: ICD-10-CM

## 2023-09-06 ENCOUNTER — APPOINTMENT (OUTPATIENT)
Dept: PSYCHIATRY | Facility: CLINIC | Age: 28
End: 2023-09-06

## 2023-09-20 ENCOUNTER — NON-APPOINTMENT (OUTPATIENT)
Age: 28
End: 2023-09-20

## 2023-10-02 ENCOUNTER — APPOINTMENT (OUTPATIENT)
Dept: PSYCHIATRY | Facility: CLINIC | Age: 28
End: 2023-10-02
Payer: MEDICAID

## 2023-10-02 ENCOUNTER — OUTPATIENT (OUTPATIENT)
Dept: OUTPATIENT SERVICES | Facility: HOSPITAL | Age: 28
LOS: 1 days | End: 2023-10-02
Payer: MEDICAID

## 2023-10-02 DIAGNOSIS — F33.1 MAJOR DEPRESSIVE DISORDER, RECURRENT, MODERATE: ICD-10-CM

## 2023-10-02 PROCEDURE — 99213 OFFICE O/P EST LOW 20 MIN: CPT | Mod: 95

## 2023-10-02 PROCEDURE — ZZZZZ: CPT

## 2023-10-03 DIAGNOSIS — F33.1 MAJOR DEPRESSIVE DISORDER, RECURRENT, MODERATE: ICD-10-CM

## 2023-11-08 ENCOUNTER — APPOINTMENT (OUTPATIENT)
Dept: PSYCHIATRY | Facility: CLINIC | Age: 28
End: 2023-11-08

## 2024-01-22 ENCOUNTER — APPOINTMENT (OUTPATIENT)
Dept: PSYCHIATRY | Facility: CLINIC | Age: 29
End: 2024-01-22
Payer: MEDICAID

## 2024-01-22 ENCOUNTER — OUTPATIENT (OUTPATIENT)
Dept: OUTPATIENT SERVICES | Facility: HOSPITAL | Age: 29
LOS: 1 days | End: 2024-01-22
Payer: MEDICAID

## 2024-01-22 DIAGNOSIS — F60.3 BORDERLINE PERSONALITY DISORDER: ICD-10-CM

## 2024-01-22 DIAGNOSIS — F41.1 GENERALIZED ANXIETY DISORDER: ICD-10-CM

## 2024-01-22 PROCEDURE — ZZZZZ: CPT

## 2024-01-22 PROCEDURE — 99213 OFFICE O/P EST LOW 20 MIN: CPT | Mod: 95

## 2024-01-22 NOTE — HISTORY OF PRESENT ILLNESS
[FreeTextEntry1] : Upon interview patient reported that her mood has been overall stable. Notes work has been much better as she has been there for approx. 1 year now.  Notes that mood has been much more stable and "Happier". Does mention sometimes she will have panic attack symptoms once a week after a stressful day of work but states prn ativan helps. Relays having her mom is a big protective factor and a huge support for her.Relays good appetite. Mentions no issues in falling asleep but does state sometimes she wakes up earlier than desired and has a hard time going back to sleep. Is future oriented. She reports compliance with medication and denies any adverse side effects. She continues to smoke cannabis daily to help with sleep and appetite, also drinks socially, but otherwise denies any substance use. She remains future oriented, has goals to eventually move out of her mother's place.  [FreeTextEntry2] : past psychiatric history of Major Depressive Disorder, Borderline Personality Disorder, Anxiety, multiple prior inpatient psychiatric admissions, prior suicide attempts by OD, most recently in outpatient treatment privately with Dr. Cross and Ms. Avril Stokes in the community, who was admitted to ICU and intubated after OD on Bupropion, Effexor, and Trileptal in a suicide attempt, admitted to [facility] IPP from 11/4/21 to 11/10/21, subsequently attended partial hospitalization program (PHP) here at [facility] for aftercare in 12/2021. \par  Desirae has since completed DBT and has remained stable since with follow up appointments for medication management. \par   [FreeTextEntry3] : Effexor\par  Wellbutrin\par  Trileptal\par  Lexapro\par

## 2024-01-22 NOTE — DISCUSSION/SUMMARY
[FreeTextEntry1] : Desirae Edmonds is a 27 year old single  Female, domiciled with family, currently unemployed, with past psychiatric history of Major Depressive Disorder, Borderline Personality Disorder, Anxiety, multiple prior inpatient psychiatric admissions, prior suicide attempts by OD, most recently in outpatient treatment privately with Dr. Cross and Ms. Avril Stokes in the community, who was admitted to ICU and intubated after OD on Bupropion, Effexor, and Trileptal in a suicide attempt, admitted to [facility] IPP from 11/4/21 to 11/10/21, subsequently attended partial hospitalization program (PHP) here at [facility] for aftercare in 12/2021, who presents today for routine med mgmt follow up. \par  \par  Upon exam, Desirae reported her stable mood. She denied safety concerns. She reported symptoms of anxiety but reported managing with therapy and grounding techniques which allow her to continue functioning without impairment. Would consider another trial with an SSRI, possibly zoloft, should her anxiety not improve. \par  \par  No suicidal or homicidal thoughts. No overt psychosis or noelle on exam. Patient has appropriate protective factors in place. Is engaged in his treatment. No acute safety concerns. Outpatient level of care remains appropriate.\par

## 2024-01-22 NOTE — PHYSICAL EXAM
[FreeTextEntry5] : Virtual encounter. Denied tremor or motor symptoms.  [Cooperative] : cooperative [Euthymic] : euthymic [Full] : full [Clear] : clear [Linear/Goal Directed] : linear/goal directed [Average] : average [WNL] : within normal limits [FreeTextEntry8] : "Good"

## 2024-01-22 NOTE — PLAN
[Yes. details: ___] : Yes, [unfilled] [Medication education provided] : Medication education provided. [FreeTextEntry4] : GOAL # 1 OBJECTIVE-4: Desriae will continue to attend appointments with psychiatrist and comply with medication as prescribed to support implementation of PLEASE skill.      [FreeTextEntry5] : - Continue Wellbutrin xL 300mg daily for symptoms of depression - Continue Vistaril 25mg-50mg Qhs PRN insomnia / anxiety symptoms. -continue ativan 0.5mg PRN  -RTC 8-12 weeks

## 2024-01-22 NOTE — REASON FOR VISIT
[Patient preference] : as per patient preference [Continuing, patient not seen in-person within last 12 months (provide details below)] : Telehealth services are continuing, patient not seen in-person within last 12 months.  [Telehealth (audio & video) - Individual/Group] : This visit was provided via telehealth using real-time 2-way audio visual technology. [Other Location: e.g. Home (Enter Location, City,State)___] : The patient, [unfilled], was located at [unfilled] at the time of the visit. [Verbal consent obtained from patient/other participant(s)] : Verbal consent for telehealth/telephonic services obtained from patient/other participant(s) [FreeTextEntry4] : 5:15 pm [FreeTextEntry5] : 5:30 pm [Patient] : Patient [FreeTextEntry1] : "I am better "

## 2024-01-23 DIAGNOSIS — F41.1 GENERALIZED ANXIETY DISORDER: ICD-10-CM

## 2024-01-23 DIAGNOSIS — F60.3 BORDERLINE PERSONALITY DISORDER: ICD-10-CM

## 2024-01-23 RX ORDER — LORAZEPAM 0.5 MG/1
0.5 TABLET ORAL DAILY
Qty: 30 | Refills: 0 | Status: ACTIVE | COMMUNITY
Start: 2023-06-12 | End: 1900-01-01

## 2024-01-23 RX ORDER — HYDROXYZINE HYDROCHLORIDE 50 MG/1
50 TABLET ORAL
Qty: 30 | Refills: 2 | Status: ACTIVE | COMMUNITY
Start: 2022-09-12 | End: 1900-01-01

## 2024-03-08 RX ORDER — BUPROPION HYDROCHLORIDE 300 MG/1
300 TABLET, EXTENDED RELEASE ORAL DAILY
Qty: 90 | Refills: 0 | Status: ACTIVE | COMMUNITY
Start: 2022-06-16 | End: 1900-01-01

## 2024-03-19 NOTE — PROGRESS NOTE BEHAVIORAL HEALTH - INSIGHT (REGARDING PSYCHIATRIC ILLNESS)
Physical Therapy Evaluation    Visit Type: Initial Evaluation  Visit: 1  Referring Provider: Jessica Berry PA-C  Medical Diagnosis (from order): Z96.651 - Status post right knee replacement   Treatment Diagnosis: right knee - increased pain/symptoms, impaired range of motion, impaired muscle length/flexibility, impaired mobility, impaired balance, impaired body mechanics, impaired activity tolerance, impaired gait and increased swelling.  Onset  - Date of Surgery:  3/14/2024  - Procedure: Right total knee arthroplasty - Right    Diagnosis Precautions: Right lower extremity weight-bearing as tolerated   Patient alert and oriented X3.  Chart reviewed at time of initial evaluation (relevant co-morbidities, allergies, tests and medications listed):   - Diagnostic tests reviewed: X-Ray  Relevant medical and surgical history reviewed. Allergies reviewed.      SUBJECTIVE                                                                                                               Patient is status post right knee replacement, patient reports he has some. Patient reports his wife helps to lie down and sit up. He is able to walk on his own and do stairs without issue. He has been able to take a shower while using a plastic bag to keep his right leg dry.     Patient reports that he had right knee pain for 1-2 years prior to surgery.     Hobbies/leisure-activities: soccer, 10 years ago was last time he played  Sleeping: well with hydrocodone  Work requirements: working at a restaurant    Red flags:  - My-incisional redness, more than expected: denies  - My-incisional edema, more than expected: denies  - My-incisonal drainage with color or more than expected: denies  - Fever/malaise: decline    Pain / Symptoms  - Pain/symptom is: intermittent  - Pain rating (out of 10): Current: 8 ; Best: 2; Worst: 8  - Location: Right knee  - Quality / Description: ache, sore, stiff  - Alleviating Factors: avoiding movement in involved  area, change in position, ice, rest, prescription medications    Function:   Limitations / Exacerbation Factors:   - Patient reports pain, difficulty and increased time with function reported below.  - bed mobility, bending/squatting/lifting, all types of transfers, community distances, household distances, stairs  Prior Level of Function: worsening pain and function, therefore underwent surgery,    Patient Goals: decreased edema, decreased pain, improved balance and return to sport/leisure activities.    Prior treatment  - outpatient PT  - Discharged from hospital, home health, or skilled nursing facility in last 30 days: yes  Home Environment   - Patient lives with: significant other wife  - Type of home: multiple level home  - Assistance available: intermittent  - Denies 2 or more falls or an unexplained fall with injury in the last year.  - Feel safe at home / work / school: yes      OBJECTIVE                                                                                                                    Incision/Wound:   - Location: Right anterior knee; Covered by hydoseal dressing - no excess swelling, redness, heat, or drainage noted; adductor bruising noted        Range of Motion (ROM)   (degrees unless noted; active unless noted; norms in ( ); negative=lacking to 0, positive=beyond 0)  Knee:   - Flexion (150):       Right:  60  Pain  Passive: 70   - Extension (0-10):       Right:  -10  Pain  Passive: -5              Bed Mobility  - Supine to sit: supervision  - Sit to supine: supervision  Cues for nicole-technique to lift right lower extremity into bed    Ambulation / Gait  - Assistive device: gait belt, 1 person and two-wheeled walker  - Distance (feet unless otherwise indicated): 60, 60  - Assist Level: supervision  - Surface: even  - Description: decreased step length left, decreased stance time RLE and step to  Increased double-limb support time    Cues for upright posture, to ambulate within the 4  points of the walker, and step-through pattern      Functional Testing  Double Leg Squat  - unable       Outcome/Assessments  Outcome Measures:   KOOS, Jr. Raw Score: 23  KOOS Jr Calculated Score: 28.25  (interval score: 0=total knee disability to 100=perfect knee health) see flowsheet for additional documentation        Treatment     Therapeutic Activity  Patient was instructed in techniques using dynamic repetitive activities simulated to improved performance of: transfers, bending/lifting, carrying, overhead activities/reaching, bed mobility, balance as needed for range of motion/strength function/goals, and to maximize independence with functional mobility and home exercise program.  · Activities:    See above cueing in mobility and gait section       Neuromuscular Re-Education  · Patient was instructed in below parameters and technique for Neuroplasticity with demonstration of the following activities to develop improvements in movement, balance, coordination, kinesthetic sense, posture, proprioception for optimal recovery of function, progress towards range of motion/strength goals, and maximize independence with home exercise program and functional mobility to decrease fall risk:  · Activities:  Patient provided with handout containing the below exercises for home exercise program with [employer]; each performed for 1 set to assure patient understanding and to provide cues for form.      - Assistance Needed and cues to facilitate: verbal/tactile cues and demo provided for posture, form, and safe set-up  - Cue frequency/progression: Improved from constant cues to intermittent cues  - Equipment used: as above  - Change in movement/function over duration of practice: improved intrapersonal attention to movement and improved posture    Patient educated to focus on knee flexion given <90 degrees tolerable today       Skilled input: as detailed above    Writer verbally educated and received verbal consent for hand  placement, positioning of patient, and techniques to be performed today from patient for therapist position for techniques, clothing adjustments for techniques and hand placement and palpation for techniques as described above and how they are pertinent to the patient's plan of care.  Home Exercise Program  Access Code: [email or web address]  URL: [email or web address]  Date: 03/19/2024  Prepared by: Nohelia Kaba  - Supine Knee Extension Stretch on Towel Roll  - 1-2 x daily - 7 x weekly - 3 sets - 15 reps - 1-2 minutes. progress to 15 minutes. hold  - Supine Quadricep Sets  - 1-2 x daily - 7 x weekly - 3 sets - 15 reps - 5 seconds hold  - Supine Heel Slide with Strap  - 1-2 x daily - 7 x weekly - 3 sets - 15 reps - 5 seconds hold  - Seated Knee Flexion Slide  - 1-2 x daily - 7 x weekly - 3 sets - 15 reps - 5 seconds hold  - Seated Long Arc Quad  - 1-2 x daily - 7 x weekly - 3 sets - 15 reps - 5 seconds hold  - Seated Heel Toe Raises  - 1-2 x daily - 7 x weekly - 3 sets - 15 reps    ASSESSMENT                                                                                                          54 year old patient has reported functional limitations listed above impacted by signs and symptoms consistent with treatment diagnosis below.  Treatment Diagnosis:   - Involved: right knee.  - Symptoms/impairments: increased pain/symptoms, impaired range of motion, impaired muscle length/flexibility, impaired mobility, impaired balance, impaired body mechanics, impaired activity tolerance, impaired gait and increased swelling.    Patient demonstrates impairments in hip, knee, and ankle strength, and knee range of motion. This impacts balance, gait, and overall household functional mobility. Specific interventions will emphasize patient education regarding squat/lifting mechanics, gait training, and pain management strategies.    Prognosis: patient will benefit from skilled therapy  Rehabilitative potential  Poor is: good.  Predicted patient presentation: Moderate (evolving) - Patient comorbidities and complexities, as defined above, may have varying impact on steady progress for prescribed plan of care.  Education:   - Present and ready to learn: patient  - Results of above outlined education: Verbalizes understanding and Demonstrates understanding    PLAN                                                                                                                         The following skilled interventions to be implemented to achieve goals listed below:  Neuromuscular Re-Education (83005)  Therapeutic Activity (87713)  Therapeutic Exercise (96380)  Manual Therapy (33263)  Gait Training (76605)    Frequency / Duration  2 times per week tapering as patient progresses for 4 weeks for an estimated total of 8 visits    Patient involved in and agreed to plan of care and goals.  Patient given attendance policy at time of initial evaluation.    Suggestions for next session as indicated: Progress per plan of care    Next session: Review home exercise program, increase standing tolerance, emphasis on knee flexion    Goals  Decrease pain/symptoms to 1-2/10  Improve involved strength to WFL  Improve involved ROM to WFL  The above improvements in impairments to assist in obtaining goals listed below  Long Term Goals: to be met by end of plan of care  1. Patient will demonstrate ability to ambulate 100 feet, independently, over level and unlevel surfaces including obstacles without increased pain or instability to return to age appropriate and community activities at prior level of function.  2. Patient will ascend and descend 1 flight of steps, with hand rail, and using reciprocal pattern for safe household and community access.  3. Patient will bend/squat with with symmetrical weight-bearing for completion of household tasks such as laundry as evidence of improved pain tolerance.  4. KOOS jr: Patient will score 50% or higher on the  KOOS Jr. to indicate improvement with walking and moving around related to knee arthroplasty. (minimal clinically important difference: 14% of calculated score)  5. Patient will be independent with progressed and modified home exercise program.      Therapy procedure time and total treatment time can be found documented on the Time Entry flowsheet

## 2024-03-27 ENCOUNTER — APPOINTMENT (OUTPATIENT)
Dept: PULMONOLOGY | Facility: CLINIC | Age: 29
End: 2024-03-27

## 2024-04-15 ENCOUNTER — APPOINTMENT (OUTPATIENT)
Dept: PSYCHIATRY | Facility: CLINIC | Age: 29
End: 2024-04-15
Payer: COMMERCIAL

## 2024-04-15 ENCOUNTER — OUTPATIENT (OUTPATIENT)
Dept: OUTPATIENT SERVICES | Facility: HOSPITAL | Age: 29
LOS: 1 days | End: 2024-04-15
Payer: COMMERCIAL

## 2024-04-15 DIAGNOSIS — F33.42 MAJOR DEPRESSIVE DISORDER, RECURRENT, IN FULL REMISSION: ICD-10-CM

## 2024-04-15 DIAGNOSIS — F41.1 GENERALIZED ANXIETY DISORDER: ICD-10-CM

## 2024-04-15 DIAGNOSIS — F60.3 BORDERLINE PERSONALITY DISORDER: ICD-10-CM

## 2024-04-15 PROCEDURE — ZZZZZ: CPT

## 2024-04-15 PROCEDURE — 99213 OFFICE O/P EST LOW 20 MIN: CPT | Mod: 95

## 2024-04-15 NOTE — DISCUSSION/SUMMARY
[FreeTextEntry1] : Desirae Edmonds is a 27 year old single  Female, domiciled with family, currently unemployed, with past psychiatric history of Major Depressive Disorder, Borderline Personality Disorder, Anxiety, multiple prior inpatient psychiatric admissions, prior suicide attempts by OD, most recently in outpatient treatment privately with Dr. Cross and Ms. Avril Stokes in the community, who was admitted to ICU and intubated after OD on Bupropion, Effexor, and Trileptal in a suicide attempt, admitted to [facility] IPP from 11/4/21 to 11/10/21, subsequently attended partial hospitalization program (PHP) here at [facility] for aftercare in 12/2021, who presents today for routine med mgmt follow up. \par  \par  Upon exam, Desirae reported her stable mood. She denied safety concerns. She reported symptoms of anxiety but reported managing with therapy and grounding techniques which allow her to continue functioning without impairment. Would consider another trial with an SSRI, possibly zoloft, should her anxiety not improve. \par  \par  No suicidal or homicidal thoughts. No overt psychosis or noelle on exam. Patient has appropriate protective factors in place. Is engaged in his treatment. No acute safety concerns. Outpatient level of care remains appropriate.\par

## 2024-04-15 NOTE — PLAN
[Yes. details: ___] : Yes, [unfilled] [Medication education provided] : Medication education provided. [FreeTextEntry4] : GOAL # 1 OBJECTIVE-4: Desirae will continue to attend appointments with psychiatrist and comply with medication as prescribed to support implementation of PLEASE skill.      [FreeTextEntry5] : - Continue Wellbutrin xL 300mg daily for symptoms of depression - Continue Vistaril 25mg-50mg Qhs PRN insomnia / anxiety symptoms. -continue ativan 0.5mg PRN  -RTC 8-12 weeks

## 2024-04-15 NOTE — HISTORY OF PRESENT ILLNESS
[FreeTextEntry1] : Upon interview patient reported that her mood has been overall stable and Happy. Notes work has been much better as she has been there for approx. 1 year now.  Relays that she was able to move out with her boyfriend and that it has been going really well thus far. Mentions, "I really am in a good place right now". Relays good appetite. Mentions no issues in falling asleep but does state sometimes she wakes up earlier than desired and has a hard time going back to sleep. Is future oriented. She reports compliance with medication and denies any adverse side effects. She continues to smoke cannabis daily to help with sleep and appetite, also drinks socially, but otherwise denies any substance use. She remains future oriented, has goals to eventually move out of her mother's place.  [FreeTextEntry2] : past psychiatric history of Major Depressive Disorder, Borderline Personality Disorder, Anxiety, multiple prior inpatient psychiatric admissions, prior suicide attempts by OD, most recently in outpatient treatment privately with Dr. Cross and Ms. Avril Stokes in the community, who was admitted to ICU and intubated after OD on Bupropion, Effexor, and Trileptal in a suicide attempt, admitted to [facility] IPP from 11/4/21 to 11/10/21, subsequently attended partial hospitalization program (PHP) here at [facility] for aftercare in 12/2021. \par  Desirae has since completed DBT and has remained stable since with follow up appointments for medication management. \par   [FreeTextEntry3] : Effexor\par  Wellbutrin\par  Trileptal\par  Lexapro\par

## 2024-04-15 NOTE — REASON FOR VISIT
[Patient preference] : as per patient preference [Continuing, patient not seen in-person within last 12 months (provide details below)] : Telehealth services are continuing, patient not seen in-person within last 12 months.  [Telehealth (audio & video) - Individual/Group] : This visit was provided via telehealth using real-time 2-way audio visual technology. [Other Location: e.g. Home (Enter Location, City,State)___] : The patient, [unfilled], was located at [unfilled] at the time of the visit. [Verbal consent obtained from patient/other participant(s)] : Verbal consent for telehealth/telephonic services obtained from patient/other participant(s) [FreeTextEntry4] : 4:30 pm [FreeTextEntry5] : 4:45 pm [Patient] : Patient [FreeTextEntry1] : "I am better "

## 2024-04-16 DIAGNOSIS — F33.42 MAJOR DEPRESSIVE DISORDER, RECURRENT, IN FULL REMISSION: ICD-10-CM

## 2024-04-16 DIAGNOSIS — F60.3 BORDERLINE PERSONALITY DISORDER: ICD-10-CM

## 2024-04-16 DIAGNOSIS — F41.1 GENERALIZED ANXIETY DISORDER: ICD-10-CM

## 2024-05-08 ENCOUNTER — NON-APPOINTMENT (OUTPATIENT)
Age: 29
End: 2024-05-08

## 2024-06-10 ENCOUNTER — APPOINTMENT (OUTPATIENT)
Dept: PSYCHIATRY | Facility: CLINIC | Age: 29
End: 2024-06-10

## 2024-07-05 ENCOUNTER — NON-APPOINTMENT (OUTPATIENT)
Age: 29
End: 2024-07-05

## 2024-07-11 ENCOUNTER — NON-APPOINTMENT (OUTPATIENT)
Age: 29
End: 2024-07-11

## 2024-07-23 ENCOUNTER — RX RENEWAL (OUTPATIENT)
Age: 29
End: 2024-07-23

## 2024-07-29 ENCOUNTER — APPOINTMENT (OUTPATIENT)
Dept: PSYCHIATRY | Facility: CLINIC | Age: 29
End: 2024-07-29
Payer: COMMERCIAL

## 2024-07-29 ENCOUNTER — OUTPATIENT (OUTPATIENT)
Dept: OUTPATIENT SERVICES | Facility: HOSPITAL | Age: 29
LOS: 1 days | End: 2024-07-29
Payer: COMMERCIAL

## 2024-07-29 DIAGNOSIS — F60.3 BORDERLINE PERSONALITY DISORDER: ICD-10-CM

## 2024-07-29 DIAGNOSIS — F41.1 GENERALIZED ANXIETY DISORDER: ICD-10-CM

## 2024-07-29 DIAGNOSIS — F33.42 MAJOR DEPRESSIVE DISORDER, RECURRENT, IN FULL REMISSION: ICD-10-CM

## 2024-07-29 PROCEDURE — ZZZZZ: CPT

## 2024-07-29 PROCEDURE — 99214 OFFICE O/P EST MOD 30 MIN: CPT | Mod: 95

## 2024-07-29 NOTE — REASON FOR VISIT
[Patient preference] : as per patient preference [Continuing, patient not seen in-person within last 12 months (provide details below)] : Telehealth services are continuing, patient not seen in-person within last 12 months.  [Telehealth (audio & video) - Individual/Group] : This visit was provided via telehealth using real-time 2-way audio visual technology. [Other Location: e.g. Home (Enter Location, City,State)___] : The patient, [unfilled], was located at [unfilled] at the time of the visit. [Verbal consent obtained from patient/other participant(s)] : Verbal consent for telehealth/telephonic services obtained from patient/other participant(s) [FreeTextEntry4] : 4:02 [FreeTextEntry5] : 4:24 [Patient] : Patient [FreeTextEntry1] : med management

## 2024-07-29 NOTE — HISTORY OF PRESENT ILLNESS
[FreeTextEntry1] : Pt was seen telephonically  Pt reports mood has been stable. Thought process was linear and goal oriented. Speech was normal with regular rate and rhythm. Pt reports she has not been depressed. States that at times she becomes anxious a time. Pt able to verbalize appropriate coping skills including using affirmations and deep breathing. Reports 1 panic attack once every other week. Reports her main concern is her finances and learning how to manage it. States she just recently moved out of her mother's house and lives with her boyfriend. States this is her first time living on her own. Supportive psychotherapy provided to patient. Reports good social support from mom and friends. States that she has no urges to self-harm and has not self-harmed in 2 years. Reports she sleeps about 6 hours. Continues to mention no issues in falling asleep but does state sometimes she wakes up earlier than desired and has a hard time going back to sleep. Reports fair appetite. She reports compliance with medication and denies any adverse side effects. She continues to smoke cannabis daily to help with sleep and appetite, also drinks socially, but otherwise denies any substance use. Pt psychoeducated on the possibility of cannabis contributing to her anxiety. She discusses with writer that she has been feeling lightheaded and has passed out intermittently since she was a teenager. States she has an appointment with PMD coming up due to having another one of these episodes last week. Reported feeling lightheaded when brushing teeth, denied passing out at that time. She denies any SI/HI, AVH or paranoia. Denies any feelings of hopelessness, helplessness or worthlessness.  [FreeTextEntry2] : past psychiatric history of Major Depressive Disorder, Borderline Personality Disorder, Anxiety, multiple prior inpatient psychiatric admissions, prior suicide attempts by OD, most recently in outpatient treatment privately with Dr. Cross and Ms. Avril Stokes in the community, who was admitted to ICU and intubated after OD on Bupropion, Effexor, and Trileptal in a suicide attempt, admitted to [facility] IPP from 11/4/21 to 11/10/21, subsequently attended partial hospitalization program (PHP) here at [facility] for aftercare in 12/2021.  Desirae has since completed DBT and has remained stable since with follow up appointments for medication management.   [FreeTextEntry3] : Effexor Wellbutrin Trileptal Lexapro

## 2024-07-29 NOTE — DISCUSSION/SUMMARY
[FreeTextEntry1] : Desirae Edmonds is a 28 year old single  Female, domiciled with boyfriend, currently employed, with past psychiatric history of Major Depressive Disorder, Borderline Personality Disorder, Anxiety, multiple prior inpatient psychiatric admissions, prior suicide attempts by OD, most recently in outpatient treatment privately with Dr. Cross and Ms. Avril Stokes in the community, who was admitted to ICU and intubated after OD on Bupropion, Effexor, and Trileptal in a suicide attempt, admitted to [facility] IPP from 11/4/21 to 11/10/21, subsequently attended partial hospitalization program (PHP) here at [facility] for aftercare in 12/2021, who presents today for routine med mgmt follow up.   Upon exam, pt appears to be doing well. Pt continues to deny any depressive symptoms. She does report some anxiety with a recent life change of moving out of her brother's house and moving in with her boyfriend. Patient is able to verbalize appropriate coping skills. As patient has been experiencing some mild anxiety intermittently, can consider adding or changing medications such as Buspar or another SSRI. Patient has protective factors in place and denies any urges to self-harm. Patient is engaged in treatment, adherent to medications, denies any SI/HI, denies psychosis or noelle, has good insight and judgement. Pt is appropriate for continued outpatient level of care at this time.

## 2024-07-29 NOTE — SOCIAL HISTORY
[FreeTextEntry1] : Patient lives with boyfriend   Cannabis: yes  Description: reports smoking 1 joint daily for years. She reports starting to smoke at age 18.  Cocaine: no known use  Heroin/Opiates: no known use  Benzodiazepines: no known use  Other Illicit Drugs/ Prescription/ OTC medications: no known use

## 2024-07-29 NOTE — PLAN
[Yes. details: ___] : Yes, [unfilled] [Medication education provided] : Medication education provided. [FreeTextEntry4] : To improve patient's mild anxiety and maintain remission of depression      [FreeTextEntry5] : -Continue Wellbutrin xL 300mg daily for symptoms of depression -Continue Vistaril 25mg-50mg Qhs PRN insomnia / anxiety symptoms. -continue ativan 0.5mg PRN  -RTC 8-12 weeks

## 2024-07-29 NOTE — FAMILY HISTORY
[FreeTextEntry1] : Patient is adopted  Biological mother struggled with substances and mental health issues

## 2024-07-30 DIAGNOSIS — F60.3 BORDERLINE PERSONALITY DISORDER: ICD-10-CM

## 2024-08-26 ENCOUNTER — RX RENEWAL (OUTPATIENT)
Age: 29
End: 2024-08-26

## 2024-08-27 NOTE — REASON FOR VISIT
[As Noted in HPI] : as noted in HPI [Negative] : Endocrine [Patient] : Patient [FreeTextEntry1] : Continuation of mental health treatment (as recent graduate of intensive DBT program).

## 2024-10-07 ENCOUNTER — NON-APPOINTMENT (OUTPATIENT)
Age: 29
End: 2024-10-07

## 2024-10-07 ENCOUNTER — APPOINTMENT (OUTPATIENT)
Dept: PSYCHIATRY | Facility: CLINIC | Age: 29
End: 2024-10-07

## 2024-10-25 NOTE — H&P ADULT - HISTORY OF PRESENT ILLNESS
25F w/PMHx of borderline personality disorder, anxiety, depression, substance abuse, s/p recent suicide attempt on 10/28, admitted to ICU and intubated/extubated.  Patient medically stabilized and transferred to [facility] for further management.  Patient seen and examined at bedside.  No acute complaints.  No CP/SOB.  No abdominal or urinary complaints.   no

## 2025-02-06 ENCOUNTER — NON-APPOINTMENT (OUTPATIENT)
Age: 30
End: 2025-02-06

## 2025-02-13 ENCOUNTER — OUTPATIENT (OUTPATIENT)
Dept: OUTPATIENT SERVICES | Facility: HOSPITAL | Age: 30
LOS: 1 days | End: 2025-02-13
Payer: COMMERCIAL

## 2025-02-13 ENCOUNTER — APPOINTMENT (OUTPATIENT)
Dept: PSYCHIATRY | Facility: CLINIC | Age: 30
End: 2025-02-13
Payer: COMMERCIAL

## 2025-02-13 DIAGNOSIS — F60.3 BORDERLINE PERSONALITY DISORDER: ICD-10-CM

## 2025-02-13 DIAGNOSIS — F33.42 MAJOR DEPRESSIVE DISORDER, RECURRENT, IN FULL REMISSION: ICD-10-CM

## 2025-02-13 PROCEDURE — ZZZZZ: CPT

## 2025-02-13 PROCEDURE — 98007 SYNCH AUDIO-VIDEO EST HI 40: CPT

## 2025-02-14 DIAGNOSIS — F33.42 MAJOR DEPRESSIVE DISORDER, RECURRENT, IN FULL REMISSION: ICD-10-CM

## 2025-05-08 ENCOUNTER — APPOINTMENT (OUTPATIENT)
Dept: PSYCHIATRY | Facility: CLINIC | Age: 30
End: 2025-05-08
Payer: COMMERCIAL

## 2025-05-08 ENCOUNTER — OUTPATIENT (OUTPATIENT)
Dept: OUTPATIENT SERVICES | Facility: HOSPITAL | Age: 30
LOS: 1 days | End: 2025-05-08
Payer: COMMERCIAL

## 2025-05-08 DIAGNOSIS — F41.1 GENERALIZED ANXIETY DISORDER: ICD-10-CM

## 2025-05-08 DIAGNOSIS — F60.3 BORDERLINE PERSONALITY DISORDER: ICD-10-CM

## 2025-05-08 DIAGNOSIS — F33.42 MAJOR DEPRESSIVE DISORDER, RECURRENT, IN FULL REMISSION: ICD-10-CM

## 2025-05-08 PROCEDURE — ZZZZZ: CPT

## 2025-05-08 PROCEDURE — 98007 SYNCH AUDIO-VIDEO EST HI 40: CPT

## 2025-05-09 DIAGNOSIS — F33.42 MAJOR DEPRESSIVE DISORDER, RECURRENT, IN FULL REMISSION: ICD-10-CM

## 2025-05-09 DIAGNOSIS — F41.1 GENERALIZED ANXIETY DISORDER: ICD-10-CM

## 2025-05-09 DIAGNOSIS — F60.3 BORDERLINE PERSONALITY DISORDER: ICD-10-CM

## 2025-05-10 NOTE — HISTORY OF PRESENT ILLNESS
[FreeTextEntry1] : Desirae was seen for psychiatric follow-up. Upon interview patient reported that her mood has been overall stable. She reports that she continues to report anticipatory anxiety prior to going to work, usually worst on Sunday. She reports anxiety is accompanied with palpitations, muscle tenseness, feeling of uneasiness. She reports that she has been using hydroxyzine 25mg for anxiety, notes some relief. She notes feeling this way constantly, has been utilizing mindfulness skills to help.  She notes that she continues to utilize DBT skills. She reports that she continues to use ativan sparingly. She notes that she has been utilizing deep breathing to help manage anxiety. She denied any feelings of hopelessness or persistent feelings of sadness.  She denied any safety concerns at this time.  She denied any thoughts of wanting to harm herself or anyone else. She reports adequate sleep/appetite. She reports adequate sleep, although notes only 5 hours of sleep, reports no issues with energy or fatigue. She reports compliance with medication and denies any adverse side effects. She continues to smoke cannabis daily to help with sleep and appetite, also drinks socially, but otherwise denies any substance use. She remains future oriented, has goals to eventually move out of her mother's place.  [FreeTextEntry2] : past psychiatric history of Major Depressive Disorder, Borderline Personality Disorder, Anxiety, multiple prior inpatient psychiatric admissions, prior suicide attempts by OD, most recently in outpatient treatment privately with Dr. Cross and Ms. Avril Stokes in the community, who was admitted to ICU and intubated after OD on Bupropion, Effexor, and Trileptal in a suicide attempt, admitted to [facility] IPP from 11/4/21 to 11/10/21, subsequently attended partial hospitalization program (PHP) here at [facility] for aftercare in 12/2021. \par Desirae has since completed DBT and has remained stable since with follow up appointments for medication management. \par  [FreeTextEntry3] : Effexor\par Wellbutrin\par Trileptal\par Lexapro\par  Amanda Rader MD

## 2025-06-24 ENCOUNTER — NON-APPOINTMENT (OUTPATIENT)
Age: 30
End: 2025-06-24

## 2025-07-29 ENCOUNTER — APPOINTMENT (OUTPATIENT)
Dept: PSYCHIATRY | Facility: CLINIC | Age: 30
End: 2025-07-29
Payer: COMMERCIAL

## 2025-07-29 ENCOUNTER — OUTPATIENT (OUTPATIENT)
Dept: OUTPATIENT SERVICES | Facility: HOSPITAL | Age: 30
LOS: 1 days | End: 2025-07-29
Payer: COMMERCIAL

## 2025-07-29 DIAGNOSIS — F33.42 MAJOR DEPRESSIVE DISORDER, RECURRENT, IN FULL REMISSION: ICD-10-CM

## 2025-07-29 DIAGNOSIS — F60.3 BORDERLINE PERSONALITY DISORDER: ICD-10-CM

## 2025-07-29 DIAGNOSIS — F41.1 GENERALIZED ANXIETY DISORDER: ICD-10-CM

## 2025-07-29 PROCEDURE — 99214 OFFICE O/P EST MOD 30 MIN: CPT | Mod: 95

## 2025-07-29 PROCEDURE — 98007 SYNCH AUDIO-VIDEO EST HI 40: CPT

## 2025-07-30 DIAGNOSIS — F60.3 BORDERLINE PERSONALITY DISORDER: ICD-10-CM

## 2025-07-30 DIAGNOSIS — F33.42 MAJOR DEPRESSIVE DISORDER, RECURRENT, IN FULL REMISSION: ICD-10-CM
